# Patient Record
Sex: FEMALE | Race: WHITE | Employment: UNEMPLOYED | URBAN - METROPOLITAN AREA
[De-identification: names, ages, dates, MRNs, and addresses within clinical notes are randomized per-mention and may not be internally consistent; named-entity substitution may affect disease eponyms.]

---

## 2019-01-01 ENCOUNTER — HOSPITAL ENCOUNTER (EMERGENCY)
Age: 74
Discharge: ANOTHER HOSPITAL | End: 2019-01-01
Attending: EMERGENCY MEDICINE
Payer: MEDICARE

## 2019-01-01 ENCOUNTER — HOSPITAL ENCOUNTER (INPATIENT)
Age: 74
LOS: 2 days | Discharge: HOME | DRG: 190 | End: 2019-01-03
Attending: INTERNAL MEDICINE | Admitting: INTERNAL MEDICINE
Payer: MEDICARE

## 2019-01-01 ENCOUNTER — APPOINTMENT (OUTPATIENT)
Dept: GENERAL RADIOLOGY | Age: 74
End: 2019-01-01
Payer: MEDICARE

## 2019-01-01 ENCOUNTER — APPOINTMENT (OUTPATIENT)
Dept: CT IMAGING | Age: 74
End: 2019-01-01
Payer: MEDICARE

## 2019-01-01 VITALS
HEIGHT: 62 IN | BODY MASS INDEX: 19.32 KG/M2 | TEMPERATURE: 98.1 F | HEART RATE: 88 BPM | SYSTOLIC BLOOD PRESSURE: 163 MMHG | WEIGHT: 105 LBS | DIASTOLIC BLOOD PRESSURE: 90 MMHG | RESPIRATION RATE: 20 BRPM | OXYGEN SATURATION: 95 %

## 2019-01-01 DIAGNOSIS — J18.9 PNEUMONIA DUE TO ORGANISM: ICD-10-CM

## 2019-01-01 DIAGNOSIS — R91.8 LUNG MASS: Primary | ICD-10-CM

## 2019-01-01 PROBLEM — J44.1 COPD EXACERBATION (HCC): Status: ACTIVE | Noted: 2019-01-01

## 2019-01-01 LAB
ALBUMIN SERPL-MCNC: 4.2 G/DL (ref 3.9–4.9)
ALP BLD-CCNC: 69 U/L (ref 40–130)
ALT SERPL-CCNC: 20 U/L (ref 0–33)
ANION GAP SERPL CALCULATED.3IONS-SCNC: 16 MEQ/L (ref 7–13)
APTT: 26.4 SEC (ref 21.6–35.4)
AST SERPL-CCNC: 23 U/L (ref 0–35)
BACTERIA: NORMAL /HPF
BASOPHILS ABSOLUTE: 0 K/UL (ref 0–0.2)
BASOPHILS RELATIVE PERCENT: 0.3 %
BILIRUB SERPL-MCNC: 0.3 MG/DL (ref 0–1.2)
BILIRUBIN URINE: NEGATIVE
BLOOD, URINE: NORMAL
BUN BLDV-MCNC: 11 MG/DL (ref 8–23)
CALCIUM SERPL-MCNC: 9.6 MG/DL (ref 8.6–10.2)
CHLORIDE BLD-SCNC: 101 MEQ/L (ref 98–107)
CLARITY: CLEAR
CO2: 22 MEQ/L (ref 22–29)
COLOR: YELLOW
CREAT SERPL-MCNC: 0.58 MG/DL (ref 0.5–0.9)
EKG ATRIAL RATE: 87 BPM
EKG P AXIS: 98 DEGREES
EKG P-R INTERVAL: 140 MS
EKG Q-T INTERVAL: 384 MS
EKG QRS DURATION: 86 MS
EKG QTC CALCULATION (BAZETT): 462 MS
EKG R AXIS: -57 DEGREES
EKG T AXIS: 113 DEGREES
EKG VENTRICULAR RATE: 87 BPM
EOSINOPHILS ABSOLUTE: 0 K/UL (ref 0–0.7)
EOSINOPHILS RELATIVE PERCENT: 0.9 %
EPITHELIAL CELLS, UA: NORMAL /HPF
GFR AFRICAN AMERICAN: >60
GFR NON-AFRICAN AMERICAN: >60
GLOBULIN: 3.9 G/DL (ref 2.3–3.5)
GLUCOSE BLD-MCNC: 109 MG/DL (ref 74–109)
GLUCOSE URINE: NEGATIVE MG/DL
HCT VFR BLD CALC: 46.8 % (ref 37–47)
HEMOGLOBIN: 15.9 G/DL (ref 12–16)
INR BLD: 1
KETONES, URINE: NORMAL MG/DL
LACTIC ACID: 1.4 MMOL/L (ref 0.5–2.2)
LEUKOCYTE ESTERASE, URINE: NORMAL
LYMPHOCYTES ABSOLUTE: 0.8 K/UL (ref 1–4.8)
LYMPHOCYTES RELATIVE PERCENT: 15.1 %
MAGNESIUM: 1.8 MG/DL (ref 1.7–2.3)
MCH RBC QN AUTO: 32.1 PG (ref 27–31.3)
MCHC RBC AUTO-ENTMCNC: 33.9 % (ref 33–37)
MCV RBC AUTO: 94.7 FL (ref 82–100)
MONOCYTES ABSOLUTE: 0.4 K/UL (ref 0.2–0.8)
MONOCYTES RELATIVE PERCENT: 7.6 %
NEUTROPHILS ABSOLUTE: 4 K/UL (ref 1.4–6.5)
NEUTROPHILS RELATIVE PERCENT: 76.1 %
NITRITE, URINE: NEGATIVE
PDW BLD-RTO: 13.5 % (ref 11.5–14.5)
PH UA: 7 (ref 5–9)
PLATELET # BLD: 241 K/UL (ref 130–400)
POTASSIUM SERPL-SCNC: 4 MEQ/L (ref 3.5–5.1)
PROCALCITONIN: <0.02 NG/ML (ref 0–0.15)
PROTEIN UA: NEGATIVE MG/DL
PROTHROMBIN TIME: 9.9 SEC (ref 9–11.5)
RAPID INFLUENZA  B AGN: NEGATIVE
RAPID INFLUENZA A AGN: NEGATIVE
RBC # BLD: 4.94 M/UL (ref 4.2–5.4)
RBC UA: NORMAL /HPF (ref 0–2)
SODIUM BLD-SCNC: 139 MEQ/L (ref 132–144)
SPECIFIC GRAVITY UA: 1.01 (ref 1–1.03)
TOTAL PROTEIN: 8.1 G/DL (ref 6.4–8.1)
URINE REFLEX TO CULTURE: YES
UROBILINOGEN, URINE: 0.2 E.U./DL
WBC # BLD: 5.2 K/UL (ref 4.8–10.8)
WBC UA: NORMAL /HPF (ref 0–5)

## 2019-01-01 PROCEDURE — 2580000003 HC RX 258: Performed by: NURSE PRACTITIONER

## 2019-01-01 PROCEDURE — 36415 COLL VENOUS BLD VENIPUNCTURE: CPT

## 2019-01-01 PROCEDURE — 83735 ASSAY OF MAGNESIUM: CPT

## 2019-01-01 PROCEDURE — 99285 EMERGENCY DEPT VISIT HI MDM: CPT

## 2019-01-01 PROCEDURE — 6370000000 HC RX 637 (ALT 250 FOR IP): Performed by: INTERNAL MEDICINE

## 2019-01-01 PROCEDURE — 6360000002 HC RX W HCPCS

## 2019-01-01 PROCEDURE — 96365 THER/PROPH/DIAG IV INF INIT: CPT

## 2019-01-01 PROCEDURE — 94664 DEMO&/EVAL PT USE INHALER: CPT

## 2019-01-01 PROCEDURE — 94640 AIRWAY INHALATION TREATMENT: CPT

## 2019-01-01 PROCEDURE — 2580000003 HC RX 258: Performed by: EMERGENCY MEDICINE

## 2019-01-01 PROCEDURE — 71046 X-RAY EXAM CHEST 2 VIEWS: CPT

## 2019-01-01 PROCEDURE — 96372 THER/PROPH/DIAG INJ SC/IM: CPT

## 2019-01-01 PROCEDURE — 93005 ELECTROCARDIOGRAM TRACING: CPT

## 2019-01-01 PROCEDURE — 2580000003 HC RX 258: Performed by: INTERNAL MEDICINE

## 2019-01-01 PROCEDURE — 71260 CT THORAX DX C+: CPT

## 2019-01-01 PROCEDURE — 84145 PROCALCITONIN (PCT): CPT

## 2019-01-01 PROCEDURE — 85025 COMPLETE CBC W/AUTO DIFF WBC: CPT

## 2019-01-01 PROCEDURE — 87040 BLOOD CULTURE FOR BACTERIA: CPT

## 2019-01-01 PROCEDURE — G0378 HOSPITAL OBSERVATION PER HR: HCPCS

## 2019-01-01 PROCEDURE — 96375 TX/PRO/DX INJ NEW DRUG ADDON: CPT

## 2019-01-01 PROCEDURE — 6370000000 HC RX 637 (ALT 250 FOR IP): Performed by: EMERGENCY MEDICINE

## 2019-01-01 PROCEDURE — 2700000000 HC OXYGEN THERAPY PER DAY

## 2019-01-01 PROCEDURE — 87804 INFLUENZA ASSAY W/OPTIC: CPT

## 2019-01-01 PROCEDURE — 6360000004 HC RX CONTRAST MEDICATION: Performed by: EMERGENCY MEDICINE

## 2019-01-01 PROCEDURE — 80053 COMPREHEN METABOLIC PANEL: CPT

## 2019-01-01 PROCEDURE — 87086 URINE CULTURE/COLONY COUNT: CPT

## 2019-01-01 PROCEDURE — 6360000002 HC RX W HCPCS: Performed by: INTERNAL MEDICINE

## 2019-01-01 PROCEDURE — 6360000002 HC RX W HCPCS: Performed by: EMERGENCY MEDICINE

## 2019-01-01 PROCEDURE — 83605 ASSAY OF LACTIC ACID: CPT

## 2019-01-01 PROCEDURE — 85730 THROMBOPLASTIN TIME PARTIAL: CPT

## 2019-01-01 PROCEDURE — 85610 PROTHROMBIN TIME: CPT

## 2019-01-01 PROCEDURE — 81001 URINALYSIS AUTO W/SCOPE: CPT

## 2019-01-01 RX ORDER — CEFTRIAXONE 1 G/1
INJECTION, POWDER, FOR SOLUTION INTRAMUSCULAR; INTRAVENOUS
Status: COMPLETED
Start: 2019-01-01 | End: 2019-01-01

## 2019-01-01 RX ORDER — SODIUM CHLORIDE 0.9 % (FLUSH) 0.9 %
10 SYRINGE (ML) INJECTION EVERY 12 HOURS SCHEDULED
Status: DISCONTINUED | OUTPATIENT
Start: 2019-01-01 | End: 2019-01-03 | Stop reason: HOSPADM

## 2019-01-01 RX ORDER — NICOTINE 21 MG/24HR
1 PATCH, TRANSDERMAL 24 HOURS TRANSDERMAL DAILY
Status: DISCONTINUED | OUTPATIENT
Start: 2019-01-01 | End: 2019-01-03 | Stop reason: HOSPADM

## 2019-01-01 RX ORDER — LEVOFLOXACIN 5 MG/ML
750 INJECTION, SOLUTION INTRAVENOUS ONCE
Status: COMPLETED | OUTPATIENT
Start: 2019-01-01 | End: 2019-01-01

## 2019-01-01 RX ORDER — 0.9 % SODIUM CHLORIDE 0.9 %
1000 INTRAVENOUS SOLUTION INTRAVENOUS ONCE
Status: COMPLETED | OUTPATIENT
Start: 2019-01-01 | End: 2019-01-01

## 2019-01-01 RX ORDER — SODIUM CHLORIDE 0.9 % (FLUSH) 0.9 %
10 SYRINGE (ML) INJECTION PRN
Status: DISCONTINUED | OUTPATIENT
Start: 2019-01-01 | End: 2019-01-03 | Stop reason: HOSPADM

## 2019-01-01 RX ORDER — ALBUTEROL SULFATE 2.5 MG/3ML
2.5 SOLUTION RESPIRATORY (INHALATION)
Status: DISCONTINUED | OUTPATIENT
Start: 2019-01-01 | End: 2019-01-01

## 2019-01-01 RX ORDER — IPRATROPIUM BROMIDE AND ALBUTEROL SULFATE 2.5; .5 MG/3ML; MG/3ML
1 SOLUTION RESPIRATORY (INHALATION) 3 TIMES DAILY
Status: DISCONTINUED | OUTPATIENT
Start: 2019-01-01 | End: 2019-01-03 | Stop reason: HOSPADM

## 2019-01-01 RX ORDER — ALBUTEROL SULFATE 2.5 MG/3ML
2.5 SOLUTION RESPIRATORY (INHALATION)
Status: DISCONTINUED | OUTPATIENT
Start: 2019-01-01 | End: 2019-01-03 | Stop reason: HOSPADM

## 2019-01-01 RX ORDER — FAMOTIDINE 20 MG/1
20 TABLET, FILM COATED ORAL 2 TIMES DAILY
Status: DISCONTINUED | OUTPATIENT
Start: 2019-01-01 | End: 2019-01-03 | Stop reason: HOSPADM

## 2019-01-01 RX ORDER — METHYLPREDNISOLONE SODIUM SUCCINATE 125 MG/2ML
125 INJECTION, POWDER, LYOPHILIZED, FOR SOLUTION INTRAMUSCULAR; INTRAVENOUS ONCE
Status: COMPLETED | OUTPATIENT
Start: 2019-01-01 | End: 2019-01-01

## 2019-01-01 RX ORDER — ONDANSETRON 2 MG/ML
4 INJECTION INTRAMUSCULAR; INTRAVENOUS EVERY 6 HOURS PRN
Status: DISCONTINUED | OUTPATIENT
Start: 2019-01-01 | End: 2019-01-03 | Stop reason: HOSPADM

## 2019-01-01 RX ORDER — IPRATROPIUM BROMIDE AND ALBUTEROL SULFATE 2.5; .5 MG/3ML; MG/3ML
1 SOLUTION RESPIRATORY (INHALATION)
Status: DISCONTINUED | OUTPATIENT
Start: 2019-01-01 | End: 2019-01-01

## 2019-01-01 RX ORDER — IPRATROPIUM BROMIDE AND ALBUTEROL SULFATE 2.5; .5 MG/3ML; MG/3ML
1 SOLUTION RESPIRATORY (INHALATION) ONCE
Status: COMPLETED | OUTPATIENT
Start: 2019-01-01 | End: 2019-01-01

## 2019-01-01 RX ORDER — GUAIFENESIN 100 MG/5ML
200 SOLUTION ORAL EVERY 4 HOURS PRN
Status: ON HOLD | COMMUNITY
End: 2019-01-02

## 2019-01-01 RX ORDER — PREDNISONE 20 MG/1
40 TABLET ORAL DAILY
Status: DISCONTINUED | OUTPATIENT
Start: 2019-01-01 | End: 2019-01-03 | Stop reason: HOSPADM

## 2019-01-01 RX ADMIN — IPRATROPIUM BROMIDE AND ALBUTEROL SULFATE 1 AMPULE: .5; 3 SOLUTION RESPIRATORY (INHALATION) at 11:26

## 2019-01-01 RX ADMIN — AZITHROMYCIN MONOHYDRATE 500 MG: 500 INJECTION, POWDER, LYOPHILIZED, FOR SOLUTION INTRAVENOUS at 17:56

## 2019-01-01 RX ADMIN — CEFTRIAXONE 1000 MG: 1 INJECTION, POWDER, FOR SOLUTION INTRAMUSCULAR; INTRAVENOUS at 14:03

## 2019-01-01 RX ADMIN — FAMOTIDINE 20 MG: 20 TABLET, FILM COATED ORAL at 21:52

## 2019-01-01 RX ADMIN — Medication 10 ML: at 21:53

## 2019-01-01 RX ADMIN — IPRATROPIUM BROMIDE AND ALBUTEROL SULFATE 1 AMPULE: .5; 3 SOLUTION RESPIRATORY (INHALATION) at 21:03

## 2019-01-01 RX ADMIN — LEVOFLOXACIN 750 MG: 5 INJECTION, SOLUTION INTRAVENOUS at 13:04

## 2019-01-01 RX ADMIN — ENOXAPARIN SODIUM 40 MG: 40 INJECTION SUBCUTANEOUS at 17:56

## 2019-01-01 RX ADMIN — IOPAMIDOL 100 ML: 612 INJECTION, SOLUTION INTRAVENOUS at 12:56

## 2019-01-01 RX ADMIN — SODIUM CHLORIDE 1000 ML: 9 INJECTION, SOLUTION INTRAVENOUS at 11:31

## 2019-01-01 RX ADMIN — METHYLPREDNISOLONE SODIUM SUCCINATE 125 MG: 125 INJECTION, POWDER, FOR SOLUTION INTRAMUSCULAR; INTRAVENOUS at 11:31

## 2019-01-01 RX ADMIN — PREDNISONE 40 MG: 20 TABLET ORAL at 17:56

## 2019-01-01 ASSESSMENT — ENCOUNTER SYMPTOMS
COUGH: 1
ABDOMINAL DISTENTION: 0
COLOR CHANGE: 0
EYE PAIN: 0
ABDOMINAL PAIN: 0
CONSTIPATION: 0
APNEA: 0
NAUSEA: 0
VOMITING: 0
WHEEZING: 1
SORE THROAT: 0
SHORTNESS OF BREATH: 1
DIARRHEA: 0
PHOTOPHOBIA: 0
CHEST TIGHTNESS: 1
BACK PAIN: 0
SINUS PRESSURE: 0
RHINORRHEA: 0

## 2019-01-02 PROBLEM — J44.9 COPD (CHRONIC OBSTRUCTIVE PULMONARY DISEASE) (HCC): Status: ACTIVE | Noted: 2019-01-02

## 2019-01-02 LAB
ANION GAP SERPL CALCULATED.3IONS-SCNC: 13 MEQ/L (ref 7–13)
BASOPHILS ABSOLUTE: 0 K/UL (ref 0–0.2)
BASOPHILS RELATIVE PERCENT: 0.1 %
BUN BLDV-MCNC: 10 MG/DL (ref 8–23)
CALCIUM SERPL-MCNC: 9 MG/DL (ref 8.6–10.2)
CHLORIDE BLD-SCNC: 106 MEQ/L (ref 98–107)
CO2: 21 MEQ/L (ref 22–29)
CREAT SERPL-MCNC: 0.57 MG/DL (ref 0.5–0.9)
EOSINOPHILS ABSOLUTE: 0 K/UL (ref 0–0.7)
EOSINOPHILS RELATIVE PERCENT: 0 %
GFR AFRICAN AMERICAN: >60
GFR NON-AFRICAN AMERICAN: >60
GLUCOSE BLD-MCNC: 130 MG/DL (ref 74–109)
HCT VFR BLD CALC: 40.6 % (ref 37–47)
HEMOGLOBIN: 14 G/DL (ref 12–16)
LYMPHOCYTES ABSOLUTE: 0.7 K/UL (ref 1–4.8)
LYMPHOCYTES RELATIVE PERCENT: 12.7 %
MCH RBC QN AUTO: 33 PG (ref 27–31.3)
MCHC RBC AUTO-ENTMCNC: 34.5 % (ref 33–37)
MCV RBC AUTO: 95.8 FL (ref 82–100)
MONOCYTES ABSOLUTE: 0.5 K/UL (ref 0.2–0.8)
MONOCYTES RELATIVE PERCENT: 9.3 %
NEUTROPHILS ABSOLUTE: 4.1 K/UL (ref 1.4–6.5)
NEUTROPHILS RELATIVE PERCENT: 77.9 %
PDW BLD-RTO: 13.5 % (ref 11.5–14.5)
PLATELET # BLD: 205 K/UL (ref 130–400)
POTASSIUM REFLEX MAGNESIUM: 4.5 MEQ/L (ref 3.5–5.1)
RBC # BLD: 4.23 M/UL (ref 4.2–5.4)
SODIUM BLD-SCNC: 140 MEQ/L (ref 132–144)
WBC # BLD: 5.3 K/UL (ref 4.8–10.8)

## 2019-01-02 PROCEDURE — 36415 COLL VENOUS BLD VENIPUNCTURE: CPT

## 2019-01-02 PROCEDURE — 1210000000 HC MED SURG R&B

## 2019-01-02 PROCEDURE — 6370000000 HC RX 637 (ALT 250 FOR IP): Performed by: INTERNAL MEDICINE

## 2019-01-02 PROCEDURE — 94668 MNPJ CHEST WALL SBSQ: CPT

## 2019-01-02 PROCEDURE — 99223 1ST HOSP IP/OBS HIGH 75: CPT | Performed by: INTERNAL MEDICINE

## 2019-01-02 PROCEDURE — 94640 AIRWAY INHALATION TREATMENT: CPT

## 2019-01-02 PROCEDURE — 80048 BASIC METABOLIC PNL TOTAL CA: CPT

## 2019-01-02 PROCEDURE — 87070 CULTURE OTHR SPECIMN AEROBIC: CPT

## 2019-01-02 PROCEDURE — 94667 MNPJ CHEST WALL 1ST: CPT

## 2019-01-02 PROCEDURE — 2700000000 HC OXYGEN THERAPY PER DAY

## 2019-01-02 PROCEDURE — 6360000002 HC RX W HCPCS: Performed by: INTERNAL MEDICINE

## 2019-01-02 PROCEDURE — 94618 PULMONARY STRESS TESTING: CPT

## 2019-01-02 PROCEDURE — 85025 COMPLETE CBC W/AUTO DIFF WBC: CPT

## 2019-01-02 PROCEDURE — 87205 SMEAR GRAM STAIN: CPT

## 2019-01-02 RX ORDER — NICOTINE 21 MG/24HR
1 PATCH, TRANSDERMAL 24 HOURS TRANSDERMAL DAILY
Qty: 30 PATCH | Refills: 3 | Status: SHIPPED | OUTPATIENT
Start: 2019-01-03 | End: 2019-01-10

## 2019-01-02 RX ORDER — NICOTINE 21 MG/24HR
1 PATCH, TRANSDERMAL 24 HOURS TRANSDERMAL DAILY
Qty: 30 PATCH | Refills: 3 | Status: SHIPPED | OUTPATIENT
Start: 2019-01-03 | End: 2019-01-02

## 2019-01-02 RX ORDER — AMLODIPINE BESYLATE 5 MG/1
5 TABLET ORAL DAILY
Qty: 30 TABLET | Refills: 3 | Status: SHIPPED | OUTPATIENT
Start: 2019-01-03 | End: 2019-05-23 | Stop reason: SDUPTHER

## 2019-01-02 RX ORDER — AMLODIPINE BESYLATE 5 MG/1
5 TABLET ORAL DAILY
Status: DISCONTINUED | OUTPATIENT
Start: 2019-01-02 | End: 2019-01-03 | Stop reason: HOSPADM

## 2019-01-02 RX ORDER — AZITHROMYCIN 250 MG/1
250 TABLET, FILM COATED ORAL DAILY
Status: DISCONTINUED | OUTPATIENT
Start: 2019-01-02 | End: 2019-01-03 | Stop reason: HOSPADM

## 2019-01-02 RX ADMIN — IPRATROPIUM BROMIDE AND ALBUTEROL SULFATE 1 AMPULE: .5; 3 SOLUTION RESPIRATORY (INHALATION) at 13:31

## 2019-01-02 RX ADMIN — AZITHROMYCIN 250 MG: 250 TABLET, FILM COATED ORAL at 13:11

## 2019-01-02 RX ADMIN — FAMOTIDINE 20 MG: 20 TABLET, FILM COATED ORAL at 23:29

## 2019-01-02 RX ADMIN — ENOXAPARIN SODIUM 40 MG: 40 INJECTION SUBCUTANEOUS at 08:25

## 2019-01-02 RX ADMIN — IPRATROPIUM BROMIDE AND ALBUTEROL SULFATE 1 AMPULE: .5; 3 SOLUTION RESPIRATORY (INHALATION) at 07:40

## 2019-01-02 RX ADMIN — IPRATROPIUM BROMIDE AND ALBUTEROL SULFATE 1 AMPULE: .5; 3 SOLUTION RESPIRATORY (INHALATION) at 19:30

## 2019-01-02 RX ADMIN — PREDNISONE 40 MG: 20 TABLET ORAL at 08:24

## 2019-01-03 VITALS
SYSTOLIC BLOOD PRESSURE: 123 MMHG | HEART RATE: 90 BPM | OXYGEN SATURATION: 98 % | RESPIRATION RATE: 20 BRPM | DIASTOLIC BLOOD PRESSURE: 69 MMHG | HEIGHT: 62 IN | TEMPERATURE: 98.2 F | WEIGHT: 98.99 LBS | BODY MASS INDEX: 18.22 KG/M2

## 2019-01-03 DIAGNOSIS — R91.8 LUNG NODULES: Primary | ICD-10-CM

## 2019-01-03 LAB
ANION GAP SERPL CALCULATED.3IONS-SCNC: 13 MEQ/L (ref 7–13)
BUN BLDV-MCNC: 20 MG/DL (ref 8–23)
CALCIUM SERPL-MCNC: 8.3 MG/DL (ref 8.6–10.2)
CHLORIDE BLD-SCNC: 104 MEQ/L (ref 98–107)
CO2: 21 MEQ/L (ref 22–29)
CREAT SERPL-MCNC: 0.49 MG/DL (ref 0.5–0.9)
GFR AFRICAN AMERICAN: >60
GFR NON-AFRICAN AMERICAN: >60
GLUCOSE BLD-MCNC: 90 MG/DL (ref 74–109)
HCT VFR BLD CALC: 39.5 % (ref 37–47)
HEMOGLOBIN: 13.4 G/DL (ref 12–16)
MCH RBC QN AUTO: 32.8 PG (ref 27–31.3)
MCHC RBC AUTO-ENTMCNC: 34 % (ref 33–37)
MCV RBC AUTO: 96.5 FL (ref 82–100)
PDW BLD-RTO: 13.3 % (ref 11.5–14.5)
PLATELET # BLD: 200 K/UL (ref 130–400)
POTASSIUM REFLEX MAGNESIUM: 4.2 MEQ/L (ref 3.5–5.1)
RBC # BLD: 4.09 M/UL (ref 4.2–5.4)
SODIUM BLD-SCNC: 138 MEQ/L (ref 132–144)
URINE CULTURE, ROUTINE: NORMAL
WBC # BLD: 6.2 K/UL (ref 4.8–10.8)

## 2019-01-03 PROCEDURE — 94760 N-INVAS EAR/PLS OXIMETRY 1: CPT

## 2019-01-03 PROCEDURE — 94640 AIRWAY INHALATION TREATMENT: CPT

## 2019-01-03 PROCEDURE — 99232 SBSQ HOSP IP/OBS MODERATE 35: CPT | Performed by: INTERNAL MEDICINE

## 2019-01-03 PROCEDURE — 85027 COMPLETE CBC AUTOMATED: CPT

## 2019-01-03 PROCEDURE — 2580000003 HC RX 258: Performed by: NURSE PRACTITIONER

## 2019-01-03 PROCEDURE — 36415 COLL VENOUS BLD VENIPUNCTURE: CPT

## 2019-01-03 PROCEDURE — 2700000000 HC OXYGEN THERAPY PER DAY

## 2019-01-03 PROCEDURE — 80048 BASIC METABOLIC PNL TOTAL CA: CPT

## 2019-01-03 PROCEDURE — 94618 PULMONARY STRESS TESTING: CPT

## 2019-01-03 PROCEDURE — 6370000000 HC RX 637 (ALT 250 FOR IP): Performed by: INTERNAL MEDICINE

## 2019-01-03 PROCEDURE — 99223 1ST HOSP IP/OBS HIGH 75: CPT | Performed by: RADIOLOGY

## 2019-01-03 RX ORDER — IPRATROPIUM BROMIDE AND ALBUTEROL SULFATE 2.5; .5 MG/3ML; MG/3ML
3 SOLUTION RESPIRATORY (INHALATION) EVERY 6 HOURS
Qty: 360 ML | Refills: 1 | Status: SHIPPED | OUTPATIENT
Start: 2019-01-03 | End: 2019-03-25 | Stop reason: SDUPTHER

## 2019-01-03 RX ORDER — AZITHROMYCIN 250 MG/1
250 TABLET, FILM COATED ORAL DAILY
Qty: 5 TABLET | Refills: 0 | Status: SHIPPED | OUTPATIENT
Start: 2019-01-04 | End: 2019-01-09

## 2019-01-03 RX ORDER — PREDNISONE 10 MG/1
TABLET ORAL
Qty: 30 TABLET | Refills: 0 | Status: SHIPPED | OUTPATIENT
Start: 2019-01-03 | End: 2019-01-24 | Stop reason: ALTCHOICE

## 2019-01-03 RX ADMIN — PREDNISONE 40 MG: 20 TABLET ORAL at 08:43

## 2019-01-03 RX ADMIN — AMLODIPINE BESYLATE 5 MG: 5 TABLET ORAL at 08:43

## 2019-01-03 RX ADMIN — Medication 10 ML: at 08:44

## 2019-01-03 RX ADMIN — IPRATROPIUM BROMIDE AND ALBUTEROL SULFATE 1 AMPULE: .5; 3 SOLUTION RESPIRATORY (INHALATION) at 07:27

## 2019-01-03 RX ADMIN — AZITHROMYCIN 250 MG: 250 TABLET, FILM COATED ORAL at 08:43

## 2019-01-03 RX ADMIN — FAMOTIDINE 20 MG: 20 TABLET, FILM COATED ORAL at 08:43

## 2019-01-03 ASSESSMENT — ENCOUNTER SYMPTOMS
PHOTOPHOBIA: 0
COUGH: 1
WHEEZING: 1
NAUSEA: 0
BACK PAIN: 0
GASTROINTESTINAL NEGATIVE: 1
VOMITING: 0
SHORTNESS OF BREATH: 1
DIARRHEA: 0
CONSTIPATION: 0
ABDOMINAL PAIN: 0
EYES NEGATIVE: 1

## 2019-01-04 LAB
CULTURE, RESPIRATORY: ABNORMAL
CULTURE, RESPIRATORY: ABNORMAL
GRAM STAIN RESULT: ABNORMAL
ORGANISM: ABNORMAL

## 2019-01-06 LAB
BLOOD CULTURE, ROUTINE: NORMAL
CULTURE, BLOOD 2: NORMAL

## 2019-01-09 ENCOUNTER — HOSPITAL ENCOUNTER (OUTPATIENT)
Dept: CT IMAGING | Age: 74
Discharge: HOME | End: 2019-01-11
Payer: MEDICARE

## 2019-01-09 ENCOUNTER — HOSPITAL ENCOUNTER (OUTPATIENT)
Dept: GENERAL RADIOLOGY | Age: 74
Discharge: HOME | End: 2019-01-11
Payer: MEDICARE

## 2019-01-09 VITALS
HEART RATE: 82 BPM | TEMPERATURE: 97.8 F | DIASTOLIC BLOOD PRESSURE: 80 MMHG | HEIGHT: 62 IN | OXYGEN SATURATION: 93 % | RESPIRATION RATE: 19 BRPM | BODY MASS INDEX: 18.03 KG/M2 | WEIGHT: 98 LBS | SYSTOLIC BLOOD PRESSURE: 130 MMHG

## 2019-01-09 DIAGNOSIS — R91.8 LUNG NODULES: ICD-10-CM

## 2019-01-09 PROCEDURE — 88342 IMHCHEM/IMCYTCHM 1ST ANTB: CPT

## 2019-01-09 PROCEDURE — 6370000000 HC RX 637 (ALT 250 FOR IP): Performed by: NURSE PRACTITIONER

## 2019-01-09 PROCEDURE — 99214 OFFICE O/P EST MOD 30 MIN: CPT | Performed by: RADIOLOGY

## 2019-01-09 PROCEDURE — 2500000003 HC RX 250 WO HCPCS: Performed by: NURSE PRACTITIONER

## 2019-01-09 PROCEDURE — 88341 IMHCHEM/IMCYTCHM EA ADD ANTB: CPT

## 2019-01-09 PROCEDURE — 32405 CT NEEDLE BIOPSY LUNG PERCUTANEOUS: CPT | Performed by: RADIOLOGY

## 2019-01-09 PROCEDURE — 88313 SPECIAL STAINS GROUP 2: CPT

## 2019-01-09 PROCEDURE — 71046 X-RAY EXAM CHEST 2 VIEWS: CPT | Performed by: RADIOLOGY

## 2019-01-09 PROCEDURE — 2580000003 HC RX 258: Performed by: NURSE PRACTITIONER

## 2019-01-09 PROCEDURE — 71046 X-RAY EXAM CHEST 2 VIEWS: CPT

## 2019-01-09 PROCEDURE — 6360000002 HC RX W HCPCS: Performed by: NURSE PRACTITIONER

## 2019-01-09 PROCEDURE — 77012 CT SCAN FOR NEEDLE BIOPSY: CPT | Performed by: RADIOLOGY

## 2019-01-09 PROCEDURE — 32405 CT NEEDLE BIOPSY LUNG PERCUTANEOUS: CPT

## 2019-01-09 PROCEDURE — 88305 TISSUE EXAM BY PATHOLOGIST: CPT

## 2019-01-09 PROCEDURE — 77012 CT SCAN FOR NEEDLE BIOPSY: CPT

## 2019-01-09 RX ORDER — FENTANYL CITRATE 50 UG/ML
50 INJECTION, SOLUTION INTRAMUSCULAR; INTRAVENOUS
Status: DISCONTINUED | OUTPATIENT
Start: 2019-01-09 | End: 2019-01-12 | Stop reason: HOSPADM

## 2019-01-09 RX ORDER — BACITRACIN, NEOMYCIN, POLYMYXIN B 400; 3.5; 5 [USP'U]/G; MG/G; [USP'U]/G
OINTMENT TOPICAL ONCE
Status: COMPLETED | OUTPATIENT
Start: 2019-01-09 | End: 2019-01-09

## 2019-01-09 RX ORDER — LIDOCAINE HYDROCHLORIDE 20 MG/ML
5 INJECTION, SOLUTION INFILTRATION; PERINEURAL
Status: DISCONTINUED | OUTPATIENT
Start: 2019-01-09 | End: 2019-01-12 | Stop reason: HOSPADM

## 2019-01-09 RX ORDER — 0.9 % SODIUM CHLORIDE 0.9 %
250 INTRAVENOUS SOLUTION INTRAVENOUS
Status: DISCONTINUED | OUTPATIENT
Start: 2019-01-09 | End: 2019-01-12 | Stop reason: HOSPADM

## 2019-01-09 RX ORDER — MIDAZOLAM HYDROCHLORIDE 1 MG/ML
2 INJECTION INTRAMUSCULAR; INTRAVENOUS
Status: DISCONTINUED | OUTPATIENT
Start: 2019-01-09 | End: 2019-01-12 | Stop reason: HOSPADM

## 2019-01-09 RX ADMIN — FENTANYL CITRATE 50 MCG: 50 INJECTION, SOLUTION INTRAMUSCULAR; INTRAVENOUS at 10:40

## 2019-01-09 RX ADMIN — MIDAZOLAM HYDROCHLORIDE 0.5 MG: 1 INJECTION, SOLUTION INTRAMUSCULAR; INTRAVENOUS at 10:42

## 2019-01-09 RX ADMIN — BACITRACIN, NEOMYCIN, POLYMYXIN B 1 G: 400; 3.5; 5 OINTMENT TOPICAL at 10:53

## 2019-01-09 RX ADMIN — LIDOCAINE HYDROCHLORIDE 6 ML: 20 INJECTION, SOLUTION INFILTRATION; PERINEURAL at 10:45

## 2019-01-09 RX ADMIN — SODIUM CHLORIDE 250 ML: 9 INJECTION, SOLUTION INTRAVENOUS at 10:27

## 2019-01-09 ASSESSMENT — ENCOUNTER SYMPTOMS
PHOTOPHOBIA: 0
ABDOMINAL PAIN: 0
VOMITING: 0
GASTROINTESTINAL NEGATIVE: 1
EYES NEGATIVE: 1
NAUSEA: 0
WHEEZING: 0
BACK PAIN: 0
DIARRHEA: 0
SHORTNESS OF BREATH: 0
CONSTIPATION: 0
COUGH: 1

## 2019-01-09 ASSESSMENT — PAIN - FUNCTIONAL ASSESSMENT: PAIN_FUNCTIONAL_ASSESSMENT: 0-10

## 2019-01-10 ENCOUNTER — OFFICE VISIT (OUTPATIENT)
Dept: PULMONOLOGY | Age: 74
End: 2019-01-10
Payer: MEDICARE

## 2019-01-10 VITALS
HEIGHT: 62 IN | OXYGEN SATURATION: 98 % | WEIGHT: 97.6 LBS | HEART RATE: 89 BPM | SYSTOLIC BLOOD PRESSURE: 110 MMHG | TEMPERATURE: 98.5 F | DIASTOLIC BLOOD PRESSURE: 68 MMHG | BODY MASS INDEX: 17.96 KG/M2

## 2019-01-10 DIAGNOSIS — R64 PULMONARY CACHEXIA DUE TO CHRONIC OBSTRUCTIVE PULMONARY DISEASE (HCC): ICD-10-CM

## 2019-01-10 DIAGNOSIS — J44.9 PULMONARY CACHEXIA DUE TO CHRONIC OBSTRUCTIVE PULMONARY DISEASE (HCC): ICD-10-CM

## 2019-01-10 DIAGNOSIS — R91.8 LUNG NODULES: ICD-10-CM

## 2019-01-10 DIAGNOSIS — J44.9 CHRONIC OBSTRUCTIVE PULMONARY DISEASE, UNSPECIFIED COPD TYPE (HCC): Primary | ICD-10-CM

## 2019-01-10 PROCEDURE — 1111F DSCHRG MED/CURRENT MED MERGE: CPT | Performed by: INTERNAL MEDICINE

## 2019-01-10 PROCEDURE — G8419 CALC BMI OUT NRM PARAM NOF/U: HCPCS | Performed by: INTERNAL MEDICINE

## 2019-01-10 PROCEDURE — 3017F COLORECTAL CA SCREEN DOC REV: CPT | Performed by: INTERNAL MEDICINE

## 2019-01-10 PROCEDURE — 1123F ACP DISCUSS/DSCN MKR DOCD: CPT | Performed by: INTERNAL MEDICINE

## 2019-01-10 PROCEDURE — 1101F PT FALLS ASSESS-DOCD LE1/YR: CPT | Performed by: INTERNAL MEDICINE

## 2019-01-10 PROCEDURE — G8427 DOCREV CUR MEDS BY ELIG CLIN: HCPCS | Performed by: INTERNAL MEDICINE

## 2019-01-10 PROCEDURE — 1090F PRES/ABSN URINE INCON ASSESS: CPT | Performed by: INTERNAL MEDICINE

## 2019-01-10 PROCEDURE — G8400 PT W/DXA NO RESULTS DOC: HCPCS | Performed by: INTERNAL MEDICINE

## 2019-01-10 PROCEDURE — 99214 OFFICE O/P EST MOD 30 MIN: CPT | Performed by: INTERNAL MEDICINE

## 2019-01-10 PROCEDURE — G8926 SPIRO NO PERF OR DOC: HCPCS | Performed by: INTERNAL MEDICINE

## 2019-01-10 PROCEDURE — G8484 FLU IMMUNIZE NO ADMIN: HCPCS | Performed by: INTERNAL MEDICINE

## 2019-01-10 PROCEDURE — 3023F SPIROM DOC REV: CPT | Performed by: INTERNAL MEDICINE

## 2019-01-10 PROCEDURE — 1036F TOBACCO NON-USER: CPT | Performed by: INTERNAL MEDICINE

## 2019-01-10 PROCEDURE — 4040F PNEUMOC VAC/ADMIN/RCVD: CPT | Performed by: INTERNAL MEDICINE

## 2019-01-11 ENCOUNTER — OFFICE VISIT (OUTPATIENT)
Dept: INTERNAL MEDICINE | Age: 74
End: 2019-01-11
Payer: MEDICARE

## 2019-01-11 VITALS
HEIGHT: 62 IN | TEMPERATURE: 97.8 F | DIASTOLIC BLOOD PRESSURE: 78 MMHG | SYSTOLIC BLOOD PRESSURE: 120 MMHG | HEART RATE: 81 BPM | WEIGHT: 98 LBS | BODY MASS INDEX: 18.03 KG/M2 | OXYGEN SATURATION: 97 %

## 2019-01-11 DIAGNOSIS — R91.8 MULTIPLE LUNG NODULES ON CT: ICD-10-CM

## 2019-01-11 DIAGNOSIS — J44.9 CHRONIC OBSTRUCTIVE PULMONARY DISEASE, UNSPECIFIED COPD TYPE (HCC): Primary | ICD-10-CM

## 2019-01-11 DIAGNOSIS — Z23 NEED FOR PROPHYLACTIC VACCINATION AGAINST STREPTOCOCCUS PNEUMONIAE (PNEUMOCOCCUS): ICD-10-CM

## 2019-01-11 DIAGNOSIS — Z72.0 TOBACCO ABUSE: ICD-10-CM

## 2019-01-11 DIAGNOSIS — Z12.31 VISIT FOR SCREENING MAMMOGRAM: ICD-10-CM

## 2019-01-11 DIAGNOSIS — Z12.11 COLON CANCER SCREENING: ICD-10-CM

## 2019-01-11 PROCEDURE — 1111F DSCHRG MED/CURRENT MED MERGE: CPT | Performed by: PHYSICIAN ASSISTANT

## 2019-01-11 PROCEDURE — 4040F PNEUMOC VAC/ADMIN/RCVD: CPT | Performed by: PHYSICIAN ASSISTANT

## 2019-01-11 PROCEDURE — G8427 DOCREV CUR MEDS BY ELIG CLIN: HCPCS | Performed by: PHYSICIAN ASSISTANT

## 2019-01-11 PROCEDURE — G8484 FLU IMMUNIZE NO ADMIN: HCPCS | Performed by: PHYSICIAN ASSISTANT

## 2019-01-11 PROCEDURE — 99203 OFFICE O/P NEW LOW 30 MIN: CPT | Performed by: PHYSICIAN ASSISTANT

## 2019-01-11 PROCEDURE — 1036F TOBACCO NON-USER: CPT | Performed by: PHYSICIAN ASSISTANT

## 2019-01-11 PROCEDURE — 1101F PT FALLS ASSESS-DOCD LE1/YR: CPT | Performed by: PHYSICIAN ASSISTANT

## 2019-01-11 PROCEDURE — 3017F COLORECTAL CA SCREEN DOC REV: CPT | Performed by: PHYSICIAN ASSISTANT

## 2019-01-11 PROCEDURE — G8926 SPIRO NO PERF OR DOC: HCPCS | Performed by: PHYSICIAN ASSISTANT

## 2019-01-11 PROCEDURE — 1123F ACP DISCUSS/DSCN MKR DOCD: CPT | Performed by: PHYSICIAN ASSISTANT

## 2019-01-11 PROCEDURE — G0009 ADMIN PNEUMOCOCCAL VACCINE: HCPCS | Performed by: PHYSICIAN ASSISTANT

## 2019-01-11 PROCEDURE — G8400 PT W/DXA NO RESULTS DOC: HCPCS | Performed by: PHYSICIAN ASSISTANT

## 2019-01-11 PROCEDURE — G8419 CALC BMI OUT NRM PARAM NOF/U: HCPCS | Performed by: PHYSICIAN ASSISTANT

## 2019-01-11 PROCEDURE — 90670 PCV13 VACCINE IM: CPT | Performed by: PHYSICIAN ASSISTANT

## 2019-01-11 PROCEDURE — 1090F PRES/ABSN URINE INCON ASSESS: CPT | Performed by: PHYSICIAN ASSISTANT

## 2019-01-11 PROCEDURE — 3023F SPIROM DOC REV: CPT | Performed by: PHYSICIAN ASSISTANT

## 2019-01-11 ASSESSMENT — PATIENT HEALTH QUESTIONNAIRE - PHQ9
2. FEELING DOWN, DEPRESSED OR HOPELESS: 1
1. LITTLE INTEREST OR PLEASURE IN DOING THINGS: 0
SUM OF ALL RESPONSES TO PHQ QUESTIONS 1-9: 1
SUM OF ALL RESPONSES TO PHQ QUESTIONS 1-9: 1
SUM OF ALL RESPONSES TO PHQ9 QUESTIONS 1 & 2: 1

## 2019-01-14 ASSESSMENT — ENCOUNTER SYMPTOMS
CHEST TIGHTNESS: 0
COUGH: 1
SHORTNESS OF BREATH: 0
RHINORRHEA: 0
WHEEZING: 0
SINUS PAIN: 0

## 2019-01-17 ENCOUNTER — OFFICE VISIT (OUTPATIENT)
Dept: INTERVENTIONAL RADIOLOGY/VASCULAR | Age: 74
End: 2019-01-17
Payer: MEDICARE

## 2019-01-17 VITALS
BODY MASS INDEX: 17.67 KG/M2 | SYSTOLIC BLOOD PRESSURE: 126 MMHG | HEART RATE: 103 BPM | WEIGHT: 96.6 LBS | OXYGEN SATURATION: 95 % | DIASTOLIC BLOOD PRESSURE: 84 MMHG

## 2019-01-17 DIAGNOSIS — C34.91 ADENOCARCINOMA OF RIGHT LUNG (HCC): Primary | ICD-10-CM

## 2019-01-17 DIAGNOSIS — Z72.0 TOBACCO ABUSE: ICD-10-CM

## 2019-01-17 DIAGNOSIS — J44.1 COPD EXACERBATION (HCC): ICD-10-CM

## 2019-01-17 DIAGNOSIS — R91.8 MULTIPLE LUNG NODULES ON CT: ICD-10-CM

## 2019-01-17 PROCEDURE — 1101F PT FALLS ASSESS-DOCD LE1/YR: CPT | Performed by: NURSE PRACTITIONER

## 2019-01-17 PROCEDURE — 1111F DSCHRG MED/CURRENT MED MERGE: CPT | Performed by: NURSE PRACTITIONER

## 2019-01-17 PROCEDURE — 99214 OFFICE O/P EST MOD 30 MIN: CPT | Performed by: NURSE PRACTITIONER

## 2019-01-17 PROCEDURE — 3023F SPIROM DOC REV: CPT | Performed by: NURSE PRACTITIONER

## 2019-01-17 PROCEDURE — 1123F ACP DISCUSS/DSCN MKR DOCD: CPT | Performed by: NURSE PRACTITIONER

## 2019-01-17 PROCEDURE — G8484 FLU IMMUNIZE NO ADMIN: HCPCS | Performed by: NURSE PRACTITIONER

## 2019-01-17 PROCEDURE — G8427 DOCREV CUR MEDS BY ELIG CLIN: HCPCS | Performed by: NURSE PRACTITIONER

## 2019-01-17 PROCEDURE — G8926 SPIRO NO PERF OR DOC: HCPCS | Performed by: NURSE PRACTITIONER

## 2019-01-17 PROCEDURE — 3017F COLORECTAL CA SCREEN DOC REV: CPT | Performed by: NURSE PRACTITIONER

## 2019-01-17 PROCEDURE — G8419 CALC BMI OUT NRM PARAM NOF/U: HCPCS | Performed by: NURSE PRACTITIONER

## 2019-01-17 PROCEDURE — 1036F TOBACCO NON-USER: CPT | Performed by: NURSE PRACTITIONER

## 2019-01-17 PROCEDURE — 4040F PNEUMOC VAC/ADMIN/RCVD: CPT | Performed by: NURSE PRACTITIONER

## 2019-01-17 PROCEDURE — G8400 PT W/DXA NO RESULTS DOC: HCPCS | Performed by: NURSE PRACTITIONER

## 2019-01-17 PROCEDURE — 1090F PRES/ABSN URINE INCON ASSESS: CPT | Performed by: NURSE PRACTITIONER

## 2019-01-17 ASSESSMENT — ENCOUNTER SYMPTOMS
NAUSEA: 0
SORE THROAT: 1
ABDOMINAL PAIN: 0
VOMITING: 0
CONSTIPATION: 0
EYE REDNESS: 0
WHEEZING: 0
EYE ITCHING: 0
COUGH: 1
BACK PAIN: 1
SINUS PRESSURE: 0
SHORTNESS OF BREATH: 1
SINUS PAIN: 0
DIARRHEA: 0
EYE DISCHARGE: 0
EYE PAIN: 0

## 2019-01-18 ENCOUNTER — TELEPHONE (OUTPATIENT)
Dept: INTERVENTIONAL RADIOLOGY/VASCULAR | Age: 74
End: 2019-01-18

## 2019-01-24 PROBLEM — C34.2 MALIGNANT NEOPLASM OF MIDDLE LOBE OF RIGHT LUNG (HCC): Status: ACTIVE | Noted: 2019-01-24

## 2019-01-24 PROBLEM — C78.02 SECONDARY MALIGNANT NEOPLASM OF LEFT LUNG (HCC): Status: ACTIVE | Noted: 2019-01-24

## 2019-01-31 DIAGNOSIS — Z12.11 COLON CANCER SCREENING: ICD-10-CM

## 2019-02-01 ENCOUNTER — HOSPITAL ENCOUNTER (OUTPATIENT)
Dept: CT IMAGING | Age: 74
Discharge: HOME | End: 2019-02-03
Payer: MEDICARE

## 2019-02-01 DIAGNOSIS — C78.02 SECONDARY MALIGNANT NEOPLASM OF LEFT LUNG (HCC): ICD-10-CM

## 2019-02-01 DIAGNOSIS — C34.2 MALIGNANT NEOPLASM OF MIDDLE LOBE OF RIGHT LUNG (HCC): ICD-10-CM

## 2019-02-01 PROCEDURE — 78815 PET IMAGE W/CT SKULL-THIGH: CPT

## 2019-02-01 PROCEDURE — 3430000000 HC RX DIAGNOSTIC RADIOPHARMACEUTICAL: Performed by: INTERNAL MEDICINE

## 2019-02-01 PROCEDURE — A9552 F18 FDG: HCPCS | Performed by: INTERNAL MEDICINE

## 2019-02-01 RX ORDER — FLUDEOXYGLUCOSE F 18 200 MCI/ML
14.7 INJECTION, SOLUTION INTRAVENOUS
Status: COMPLETED | OUTPATIENT
Start: 2019-02-01 | End: 2019-02-01

## 2019-02-01 RX ADMIN — FLUDEOXYGLUCOSE F 18 14.7 MILLICURIE: 200 INJECTION, SOLUTION INTRAVENOUS at 09:27

## 2019-02-14 ENCOUNTER — OFFICE VISIT (OUTPATIENT)
Dept: PULMONOLOGY | Age: 74
End: 2019-02-14
Payer: MEDICARE

## 2019-02-14 VITALS
WEIGHT: 104 LBS | TEMPERATURE: 99.3 F | RESPIRATION RATE: 16 BRPM | DIASTOLIC BLOOD PRESSURE: 66 MMHG | HEIGHT: 61 IN | OXYGEN SATURATION: 96 % | HEART RATE: 98 BPM | BODY MASS INDEX: 19.63 KG/M2 | SYSTOLIC BLOOD PRESSURE: 126 MMHG

## 2019-02-14 DIAGNOSIS — R09.02 HYPOXIA: ICD-10-CM

## 2019-02-14 DIAGNOSIS — J44.9 CHRONIC OBSTRUCTIVE PULMONARY DISEASE, UNSPECIFIED COPD TYPE (HCC): ICD-10-CM

## 2019-02-14 DIAGNOSIS — C34.91 ADENOCARCINOMA OF RIGHT LUNG (HCC): Primary | ICD-10-CM

## 2019-02-14 PROCEDURE — 4040F PNEUMOC VAC/ADMIN/RCVD: CPT | Performed by: INTERNAL MEDICINE

## 2019-02-14 PROCEDURE — 1123F ACP DISCUSS/DSCN MKR DOCD: CPT | Performed by: INTERNAL MEDICINE

## 2019-02-14 PROCEDURE — G8926 SPIRO NO PERF OR DOC: HCPCS | Performed by: INTERNAL MEDICINE

## 2019-02-14 PROCEDURE — 3023F SPIROM DOC REV: CPT | Performed by: INTERNAL MEDICINE

## 2019-02-14 PROCEDURE — 99214 OFFICE O/P EST MOD 30 MIN: CPT | Performed by: INTERNAL MEDICINE

## 2019-02-14 PROCEDURE — 1036F TOBACCO NON-USER: CPT | Performed by: INTERNAL MEDICINE

## 2019-02-14 PROCEDURE — G8420 CALC BMI NORM PARAMETERS: HCPCS | Performed by: INTERNAL MEDICINE

## 2019-02-14 PROCEDURE — 1101F PT FALLS ASSESS-DOCD LE1/YR: CPT | Performed by: INTERNAL MEDICINE

## 2019-02-14 PROCEDURE — G8427 DOCREV CUR MEDS BY ELIG CLIN: HCPCS | Performed by: INTERNAL MEDICINE

## 2019-02-14 PROCEDURE — 3017F COLORECTAL CA SCREEN DOC REV: CPT | Performed by: INTERNAL MEDICINE

## 2019-02-14 PROCEDURE — G8484 FLU IMMUNIZE NO ADMIN: HCPCS | Performed by: INTERNAL MEDICINE

## 2019-02-14 PROCEDURE — G8400 PT W/DXA NO RESULTS DOC: HCPCS | Performed by: INTERNAL MEDICINE

## 2019-02-14 PROCEDURE — 1090F PRES/ABSN URINE INCON ASSESS: CPT | Performed by: INTERNAL MEDICINE

## 2019-02-14 ASSESSMENT — ENCOUNTER SYMPTOMS
WHEEZING: 0
VOICE CHANGE: 0
EYE DISCHARGE: 0
SINUS PRESSURE: 0
TROUBLE SWALLOWING: 0
SORE THROAT: 0
NAUSEA: 0
RHINORRHEA: 0
CHEST TIGHTNESS: 0
COUGH: 0
EYE ITCHING: 0
DIARRHEA: 0
VOMITING: 0
ABDOMINAL PAIN: 0
SHORTNESS OF BREATH: 1

## 2019-02-15 PROBLEM — E53.8 B12 DEFICIENCY: Status: ACTIVE | Noted: 2019-02-15

## 2019-02-26 ENCOUNTER — HOSPITAL ENCOUNTER (OUTPATIENT)
Dept: INFUSION THERAPY | Age: 74
Setting detail: INFUSION SERIES
Discharge: HOME | End: 2019-02-26
Payer: MEDICARE

## 2019-02-26 VITALS
OXYGEN SATURATION: 98 % | TEMPERATURE: 98.2 F | SYSTOLIC BLOOD PRESSURE: 116 MMHG | HEART RATE: 96 BPM | DIASTOLIC BLOOD PRESSURE: 74 MMHG | RESPIRATION RATE: 16 BRPM

## 2019-02-26 DIAGNOSIS — C78.02 SECONDARY MALIGNANT NEOPLASM OF LEFT LUNG (HCC): ICD-10-CM

## 2019-02-26 DIAGNOSIS — C34.2 MALIGNANT NEOPLASM OF MIDDLE LOBE OF RIGHT LUNG (HCC): ICD-10-CM

## 2019-02-26 PROCEDURE — 2580000003 HC RX 258

## 2019-02-26 PROCEDURE — 2580000003 HC RX 258: Performed by: INTERNAL MEDICINE

## 2019-02-26 PROCEDURE — 6360000002 HC RX W HCPCS: Performed by: INTERNAL MEDICINE

## 2019-02-26 PROCEDURE — 96375 TX/PRO/DX INJ NEW DRUG ADDON: CPT

## 2019-02-26 PROCEDURE — 96413 CHEMO IV INFUSION 1 HR: CPT

## 2019-02-26 PROCEDURE — 96365 THER/PROPH/DIAG IV INF INIT: CPT

## 2019-02-26 PROCEDURE — 96417 CHEMO IV INFUS EACH ADDL SEQ: CPT

## 2019-02-26 RX ORDER — SODIUM CHLORIDE 0.9 % (FLUSH) 0.9 %
10 SYRINGE (ML) INJECTION PRN
Status: DISCONTINUED | OUTPATIENT
Start: 2019-02-26 | End: 2019-02-27 | Stop reason: HOSPADM

## 2019-02-26 RX ORDER — SODIUM CHLORIDE 9 MG/ML
INJECTION, SOLUTION INTRAVENOUS
Status: COMPLETED
Start: 2019-02-26 | End: 2019-02-26

## 2019-02-26 RX ORDER — PALONOSETRON 0.05 MG/ML
0.25 INJECTION, SOLUTION INTRAVENOUS ONCE
Status: COMPLETED | OUTPATIENT
Start: 2019-02-26 | End: 2019-02-26

## 2019-02-26 RX ORDER — SODIUM CHLORIDE 9 MG/ML
INJECTION, SOLUTION INTRAVENOUS ONCE
Status: COMPLETED | OUTPATIENT
Start: 2019-02-26 | End: 2019-02-26

## 2019-02-26 RX ADMIN — SODIUM CHLORIDE 250 ML: 9 INJECTION, SOLUTION INTRAVENOUS at 09:41

## 2019-02-26 RX ADMIN — PALONOSETRON 0.25 MG: 0.05 INJECTION, SOLUTION INTRAVENOUS at 09:55

## 2019-02-26 RX ADMIN — SODIUM CHLORIDE 567.5 MG: 9 INJECTION, SOLUTION INTRAVENOUS at 11:39

## 2019-02-26 RX ADMIN — DEXAMETHASONE SODIUM PHOSPHATE 12 MG: 4 INJECTION, SOLUTION INTRA-ARTICULAR; INTRALESIONAL; INTRAMUSCULAR; INTRAVENOUS; SOFT TISSUE at 10:02

## 2019-02-26 RX ADMIN — CARBOPLATIN 432 MG: 10 INJECTION, SOLUTION INTRAVENOUS at 12:04

## 2019-02-26 RX ADMIN — SODIUM CHLORIDE 150 MG: 9 INJECTION, SOLUTION INTRAVENOUS at 10:22

## 2019-02-26 RX ADMIN — Medication 10 ML: at 09:40

## 2019-02-26 RX ADMIN — SODIUM CHLORIDE 200 MG: 9 INJECTION, SOLUTION INTRAVENOUS at 11:02

## 2019-02-26 NOTE — PROGRESS NOTES
Pt tolerated treatment well. Had stated that had plenty of info about meds and care. Declined more info. AVS given to pt. No c/o or requests.

## 2019-02-26 NOTE — PROGRESS NOTES
Pt c/o of SOB. States uses oxygenator. Has with her. States uses at 2l /min. Placed o2 on . PLAN:. Ox is 98%.

## 2019-03-19 ENCOUNTER — HOSPITAL ENCOUNTER (OUTPATIENT)
Dept: INFUSION THERAPY | Age: 74
Setting detail: INFUSION SERIES
Discharge: HOME | End: 2019-03-19
Payer: MEDICARE

## 2019-03-19 VITALS
TEMPERATURE: 97.1 F | HEART RATE: 97 BPM | SYSTOLIC BLOOD PRESSURE: 143 MMHG | DIASTOLIC BLOOD PRESSURE: 63 MMHG | RESPIRATION RATE: 18 BRPM

## 2019-03-19 DIAGNOSIS — C78.02 SECONDARY MALIGNANT NEOPLASM OF LEFT LUNG (HCC): ICD-10-CM

## 2019-03-19 DIAGNOSIS — C34.2 MALIGNANT NEOPLASM OF MIDDLE LOBE OF RIGHT LUNG (HCC): Primary | ICD-10-CM

## 2019-03-19 PROCEDURE — 2580000003 HC RX 258: Performed by: INTERNAL MEDICINE

## 2019-03-19 PROCEDURE — 96413 CHEMO IV INFUSION 1 HR: CPT

## 2019-03-19 PROCEDURE — 96409 CHEMO IV PUSH SNGL DRUG: CPT

## 2019-03-19 PROCEDURE — 96417 CHEMO IV INFUS EACH ADDL SEQ: CPT

## 2019-03-19 PROCEDURE — 6360000002 HC RX W HCPCS: Performed by: INTERNAL MEDICINE

## 2019-03-19 PROCEDURE — 96375 TX/PRO/DX INJ NEW DRUG ADDON: CPT

## 2019-03-19 PROCEDURE — 96367 TX/PROPH/DG ADDL SEQ IV INF: CPT

## 2019-03-19 PROCEDURE — 2580000003 HC RX 258

## 2019-03-19 PROCEDURE — 96365 THER/PROPH/DIAG IV INF INIT: CPT

## 2019-03-19 RX ORDER — SODIUM CHLORIDE 0.9 % (FLUSH) 0.9 %
10 SYRINGE (ML) INJECTION PRN
Status: DISCONTINUED | OUTPATIENT
Start: 2019-03-19 | End: 2019-03-20 | Stop reason: HOSPADM

## 2019-03-19 RX ORDER — SODIUM CHLORIDE 9 MG/ML
INJECTION, SOLUTION INTRAVENOUS
Status: COMPLETED
Start: 2019-03-19 | End: 2019-03-19

## 2019-03-19 RX ORDER — PALONOSETRON 0.05 MG/ML
0.25 INJECTION, SOLUTION INTRAVENOUS ONCE
Status: COMPLETED | OUTPATIENT
Start: 2019-03-19 | End: 2019-03-19

## 2019-03-19 RX ORDER — SODIUM CHLORIDE 9 MG/ML
INJECTION, SOLUTION INTRAVENOUS ONCE
Status: COMPLETED | OUTPATIENT
Start: 2019-03-19 | End: 2019-03-19

## 2019-03-19 RX ADMIN — SODIUM CHLORIDE: 9 INJECTION, SOLUTION INTRAVENOUS at 10:33

## 2019-03-19 RX ADMIN — CARBOPLATIN 376 MG: 10 INJECTION, SOLUTION INTRAVENOUS at 12:46

## 2019-03-19 RX ADMIN — PALONOSETRON 0.25 MG: 0.05 INJECTION, SOLUTION INTRAVENOUS at 10:33

## 2019-03-19 RX ADMIN — SODIUM CHLORIDE 150 MG: 9 INJECTION, SOLUTION INTRAVENOUS at 10:57

## 2019-03-19 RX ADMIN — DEXAMETHASONE SODIUM PHOSPHATE 12 MG: 4 INJECTION, SOLUTION INTRAMUSCULAR; INTRAVENOUS at 10:34

## 2019-03-19 RX ADMIN — SODIUM CHLORIDE 200 MG: 9 INJECTION, SOLUTION INTRAVENOUS at 11:40

## 2019-03-19 RX ADMIN — SODIUM CHLORIDE 567.5 MG: 9 INJECTION, SOLUTION INTRAVENOUS at 12:25

## 2019-03-19 NOTE — FLOWSHEET NOTE
Infusion complete. Tolerated well. Declined AVS. Ambulated off of the unit. All equipment used in the care for this patient has been cleaned.

## 2019-03-19 NOTE — FLOWSHEET NOTE
Patient to the floor ambulatory for her chemo treatment. Vital signs taken. Denies any discomfort. Call light within reach.

## 2019-03-25 RX ORDER — IPRATROPIUM BROMIDE AND ALBUTEROL SULFATE 2.5; .5 MG/3ML; MG/3ML
3 SOLUTION RESPIRATORY (INHALATION) EVERY 6 HOURS
Qty: 360 ML | Refills: 1 | Status: ON HOLD | OUTPATIENT
Start: 2019-03-25 | End: 2019-04-19 | Stop reason: ALTCHOICE

## 2019-03-26 ENCOUNTER — HOSPITAL ENCOUNTER (EMERGENCY)
Age: 74
Discharge: HOME | End: 2019-03-26
Attending: EMERGENCY MEDICINE
Payer: MEDICARE

## 2019-03-26 ENCOUNTER — APPOINTMENT (OUTPATIENT)
Dept: GENERAL RADIOLOGY | Age: 74
End: 2019-03-26
Payer: MEDICARE

## 2019-03-26 VITALS
HEIGHT: 62 IN | RESPIRATION RATE: 18 BRPM | OXYGEN SATURATION: 97 % | BODY MASS INDEX: 18.95 KG/M2 | DIASTOLIC BLOOD PRESSURE: 68 MMHG | HEART RATE: 94 BPM | TEMPERATURE: 98.1 F | WEIGHT: 103 LBS | SYSTOLIC BLOOD PRESSURE: 107 MMHG

## 2019-03-26 DIAGNOSIS — J44.1 COPD EXACERBATION (HCC): ICD-10-CM

## 2019-03-26 DIAGNOSIS — J06.9 URI WITH COUGH AND CONGESTION: Primary | ICD-10-CM

## 2019-03-26 LAB
ANION GAP SERPL CALCULATED.3IONS-SCNC: 13 MEQ/L (ref 9–15)
BASOPHILS ABSOLUTE: 0 K/UL (ref 0–0.2)
BASOPHILS RELATIVE PERCENT: 0.3 %
BUN BLDV-MCNC: 13 MG/DL (ref 8–23)
CALCIUM SERPL-MCNC: 8.9 MG/DL (ref 8.5–9.9)
CHLORIDE BLD-SCNC: 102 MEQ/L (ref 95–107)
CO2: 22 MEQ/L (ref 20–31)
CREAT SERPL-MCNC: 0.43 MG/DL (ref 0.5–0.9)
EKG ATRIAL RATE: 89 BPM
EKG P AXIS: 70 DEGREES
EKG P-R INTERVAL: 144 MS
EKG Q-T INTERVAL: 378 MS
EKG QRS DURATION: 86 MS
EKG QTC CALCULATION (BAZETT): 459 MS
EKG R AXIS: 11 DEGREES
EKG T AXIS: 55 DEGREES
EKG VENTRICULAR RATE: 89 BPM
EOSINOPHILS ABSOLUTE: 0.1 K/UL (ref 0–0.7)
EOSINOPHILS RELATIVE PERCENT: 2.3 %
GFR AFRICAN AMERICAN: >60
GFR NON-AFRICAN AMERICAN: >60
GLUCOSE BLD-MCNC: 96 MG/DL (ref 70–99)
HCT VFR BLD CALC: 33.5 % (ref 37–47)
HEMOGLOBIN: 11.7 G/DL (ref 12–16)
LYMPHOCYTES ABSOLUTE: 0.5 K/UL (ref 1–4.8)
LYMPHOCYTES RELATIVE PERCENT: 14.5 %
MCH RBC QN AUTO: 32.4 PG (ref 27–31.3)
MCHC RBC AUTO-ENTMCNC: 35 % (ref 33–37)
MCV RBC AUTO: 92.5 FL (ref 82–100)
MONOCYTES ABSOLUTE: 0.6 K/UL (ref 0.2–0.8)
MONOCYTES RELATIVE PERCENT: 16.1 %
NEUTROPHILS ABSOLUTE: 2.3 K/UL (ref 1.4–6.5)
NEUTROPHILS RELATIVE PERCENT: 66.8 %
PDW BLD-RTO: 14 % (ref 11.5–14.5)
PLATELET # BLD: 175 K/UL (ref 130–400)
PLATELET SLIDE REVIEW: ADEQUATE
POTASSIUM SERPL-SCNC: 4.2 MEQ/L (ref 3.4–4.9)
RBC # BLD: 3.62 M/UL (ref 4.2–5.4)
SLIDE REVIEW: ABNORMAL
SODIUM BLD-SCNC: 137 MEQ/L (ref 135–144)
TROPONIN: <0.01 NG/ML (ref 0–0.01)
WBC # BLD: 3.4 K/UL (ref 4.8–10.8)

## 2019-03-26 PROCEDURE — 80048 BASIC METABOLIC PNL TOTAL CA: CPT

## 2019-03-26 PROCEDURE — 71045 X-RAY EXAM CHEST 1 VIEW: CPT

## 2019-03-26 PROCEDURE — 94640 AIRWAY INHALATION TREATMENT: CPT

## 2019-03-26 PROCEDURE — 84484 ASSAY OF TROPONIN QUANT: CPT

## 2019-03-26 PROCEDURE — 96374 THER/PROPH/DIAG INJ IV PUSH: CPT

## 2019-03-26 PROCEDURE — 99284 EMERGENCY DEPT VISIT MOD MDM: CPT

## 2019-03-26 PROCEDURE — 6360000002 HC RX W HCPCS: Performed by: EMERGENCY MEDICINE

## 2019-03-26 PROCEDURE — 36415 COLL VENOUS BLD VENIPUNCTURE: CPT

## 2019-03-26 PROCEDURE — 93005 ELECTROCARDIOGRAM TRACING: CPT

## 2019-03-26 PROCEDURE — 85025 COMPLETE CBC W/AUTO DIFF WBC: CPT

## 2019-03-26 RX ORDER — ALBUTEROL SULFATE 2.5 MG/3ML
2.5 SOLUTION RESPIRATORY (INHALATION) EVERY 6 HOURS PRN
Qty: 120 EACH | Refills: 1 | Status: SHIPPED | OUTPATIENT
Start: 2019-03-26

## 2019-03-26 RX ORDER — AZITHROMYCIN 250 MG/1
TABLET, FILM COATED ORAL
Qty: 1 PACKET | Refills: 0 | Status: SHIPPED | OUTPATIENT
Start: 2019-03-26 | End: 2019-03-30

## 2019-03-26 RX ORDER — KETOROLAC TROMETHAMINE 30 MG/ML
30 INJECTION, SOLUTION INTRAMUSCULAR; INTRAVENOUS ONCE
Status: COMPLETED | OUTPATIENT
Start: 2019-03-26 | End: 2019-03-26

## 2019-03-26 RX ORDER — PREDNISONE 20 MG/1
20 TABLET ORAL 2 TIMES DAILY
Qty: 10 TABLET | Refills: 0 | Status: SHIPPED | OUTPATIENT
Start: 2019-03-26 | End: 2019-03-31

## 2019-03-26 RX ADMIN — IPRATROPIUM BROMIDE 0.5 MG: 0.5 SOLUTION RESPIRATORY (INHALATION) at 10:30

## 2019-03-26 RX ADMIN — ALBUTEROL SULFATE 5 MG: 2.5 SOLUTION RESPIRATORY (INHALATION) at 10:30

## 2019-03-26 RX ADMIN — KETOROLAC TROMETHAMINE 30 MG: 30 INJECTION, SOLUTION INTRAMUSCULAR; INTRAVENOUS at 10:24

## 2019-03-26 ASSESSMENT — PAIN DESCRIPTION - LOCATION: LOCATION: CHEST;RIB CAGE

## 2019-03-26 ASSESSMENT — ENCOUNTER SYMPTOMS
ABDOMINAL DISTENTION: 0
BACK PAIN: 0
SORE THROAT: 0
SINUS PAIN: 0
ABDOMINAL PAIN: 0
BLOOD IN STOOL: 0
GASTROINTESTINAL NEGATIVE: 1
VOMITING: 0
CHEST TIGHTNESS: 0
SHORTNESS OF BREATH: 1
EYE PAIN: 0
NAUSEA: 0
WHEEZING: 0
DIARRHEA: 0
EYES NEGATIVE: 1
COUGH: 1
EYE DISCHARGE: 0

## 2019-03-26 ASSESSMENT — PAIN DESCRIPTION - FREQUENCY: FREQUENCY: INTERMITTENT

## 2019-03-26 ASSESSMENT — PAIN SCALES - GENERAL
PAINLEVEL_OUTOF10: 5
PAINLEVEL_OUTOF10: 8

## 2019-03-26 ASSESSMENT — PAIN DESCRIPTION - DESCRIPTORS: DESCRIPTORS: PATIENT UNABLE TO DESCRIBE

## 2019-03-29 ENCOUNTER — OFFICE VISIT (OUTPATIENT)
Dept: INTERNAL MEDICINE | Age: 74
End: 2019-03-29
Payer: MEDICARE

## 2019-03-29 VITALS
HEART RATE: 82 BPM | WEIGHT: 105 LBS | SYSTOLIC BLOOD PRESSURE: 110 MMHG | DIASTOLIC BLOOD PRESSURE: 64 MMHG | BODY MASS INDEX: 19.32 KG/M2 | TEMPERATURE: 98.6 F | OXYGEN SATURATION: 98 % | HEIGHT: 62 IN

## 2019-03-29 DIAGNOSIS — J06.9 ACUTE URI: Primary | ICD-10-CM

## 2019-03-29 PROCEDURE — G8484 FLU IMMUNIZE NO ADMIN: HCPCS | Performed by: PHYSICIAN ASSISTANT

## 2019-03-29 PROCEDURE — G8400 PT W/DXA NO RESULTS DOC: HCPCS | Performed by: PHYSICIAN ASSISTANT

## 2019-03-29 PROCEDURE — G8427 DOCREV CUR MEDS BY ELIG CLIN: HCPCS | Performed by: PHYSICIAN ASSISTANT

## 2019-03-29 PROCEDURE — 1090F PRES/ABSN URINE INCON ASSESS: CPT | Performed by: PHYSICIAN ASSISTANT

## 2019-03-29 PROCEDURE — 1036F TOBACCO NON-USER: CPT | Performed by: PHYSICIAN ASSISTANT

## 2019-03-29 PROCEDURE — 1123F ACP DISCUSS/DSCN MKR DOCD: CPT | Performed by: PHYSICIAN ASSISTANT

## 2019-03-29 PROCEDURE — 99213 OFFICE O/P EST LOW 20 MIN: CPT | Performed by: PHYSICIAN ASSISTANT

## 2019-03-29 PROCEDURE — 3017F COLORECTAL CA SCREEN DOC REV: CPT | Performed by: PHYSICIAN ASSISTANT

## 2019-03-29 PROCEDURE — G8420 CALC BMI NORM PARAMETERS: HCPCS | Performed by: PHYSICIAN ASSISTANT

## 2019-03-29 PROCEDURE — 4040F PNEUMOC VAC/ADMIN/RCVD: CPT | Performed by: PHYSICIAN ASSISTANT

## 2019-03-29 ASSESSMENT — ENCOUNTER SYMPTOMS
WHEEZING: 0
SHORTNESS OF BREATH: 0
CHEST TIGHTNESS: 0
COUGH: 1

## 2019-03-29 NOTE — PROGRESS NOTES
3/29/2019     Willy Hogue (:  1945) is a 75 y.o. female, here for evaluation of the following medical concerns:    Chief Complaint   Patient presents with   [address] from [facility]     3/26/19 [facility] ER, pt was given antibiotics, pt states she feels a little better. [facility]      ER follow UP:  Patient is here for a follow up after an ER visit at [facility] on 3/26  . She went to the ER for cough, diagnosed with URI. Labs and CXR was done and results were metastatic nodules again seen  Pt is undergoing chemo for primary lung cancer . Stopped smoking 2019   She states Her condition has significantly improved, phlegm is clear, chest hurts when coughing, no SOB   New treatment includes Zpak and prednisone, and inhalers . Review of Systems   Constitutional: Negative for chills, fatigue and fever. HENT: Negative for congestion. Respiratory: Positive for cough. Negative for chest tightness, shortness of breath and wheezing. Prior to Visit Medications    Medication Sig Taking?  Authorizing Provider   albuterol (PROVENTIL) (2.5 MG/3ML) 0.083% nebulizer solution Take 3 mLs by nebulization every 6 hours as needed for Wheezing Yes Antonio Billings,    ipratropium-albuterol (DUONEB) 0.5-2.5 (3) MG/3ML SOLN nebulizer solution Inhale 3 mLs into the lungs every 6 hours Yes Wade Brunner MD   acetaminophen (TYLENOL) 500 MG tablet Take 1,000 mg by mouth every 6 hours as needed for Pain Yes Historical Provider, MD   magnesium hydroxide (MILK OF MAGNESIA) 400 MG/5ML suspension Take 30 mLs by mouth daily as needed for Constipation Yes Historical Provider, MD   folic acid (FOLVITE) 1 MG tablet Take 1 tablet by mouth daily Yes Brett Combs, DO   Multiple Vitamins-Minerals (CENTRUM SILVER 50+WOMEN) TABS Take 1 tablet by mouth daily Yes Historical Provider, MD   tiotropium (SPIRIVA RESPIMAT) 2.5 MCG/ACT AERS inhaler Inhale 2 puffs into the lungs daily Yes Michelle Marrero MD   amLODIPine (NORVASC) 5 MG tablet Take 1 tablet by mouth daily Yes Rikki Cullen MD   prochlorperazine (COMPAZINE) 10 MG tablet Take 1 tablet by mouth every 6 hours as needed (nausea)  Juan Rios DO        Social History     Tobacco Use    Smoking status: Former Smoker     Packs/day: 1.00     Years: 60.00     Pack years: 60.00     Types: Cigarettes     Start date: 1/10/1964     Last attempt to quit: 2019     Years since quittin.2    Smokeless tobacco: Never Used   Substance Use Topics    Alcohol use: Not Currently     Alcohol/week: 9.0 oz     Types: 15 Cans of beer per week     Comment: per pt has not had any since Dec 28 2018        Vitals:    19 1441   BP: 110/64   Site: Left Upper Arm   Position: Sitting   Cuff Size: Medium Adult   Pulse: 82   Temp: 98.6 °F (37 °C)   TempSrc: Temporal   SpO2: 98%   Weight: 105 lb (47.6 kg)   Height: 5' 2\" (1.575 m)     Estimated body mass index is 19.2 kg/m² as calculated from the following:    Height as of this encounter: 5' 2\" (1.575 m). Weight as of this encounter: 105 lb (47.6 kg). Physical Exam   Constitutional: She appears well-developed and well-nourished. HENT:   Head: Normocephalic and atraumatic. Nose: Nose normal.   Mouth/Throat: Oropharynx is clear and moist.   Eyes: Pupils are equal, round, and reactive to light. Conjunctivae and EOM are normal.   Neck: Normal range of motion. Neck supple. Cardiovascular: Normal rate. Pulmonary/Chest: Effort normal and breath sounds normal. She exhibits no tenderness. ASSESSMENT/PLAN:  1. Acute URI  -  improving  - continue current treatment, rest and increase fluids       No follow-ups on file. An electronic signature was used to authenticate this note.     --DERECK Phelan on 2019 at 9:01 AM

## 2019-04-03 ENCOUNTER — HOSPITAL ENCOUNTER (OUTPATIENT)
Dept: CT IMAGING | Age: 74
Discharge: HOME | End: 2019-04-05
Payer: MEDICARE

## 2019-04-03 DIAGNOSIS — C34.2 MALIGNANT NEOPLASM OF MIDDLE LOBE OF RIGHT LUNG (HCC): ICD-10-CM

## 2019-04-03 DIAGNOSIS — C78.02 SECONDARY MALIGNANT NEOPLASM OF LEFT LUNG (HCC): ICD-10-CM

## 2019-04-03 PROCEDURE — 70470 CT HEAD/BRAIN W/O & W/DYE: CPT

## 2019-04-03 PROCEDURE — 6360000004 HC RX CONTRAST MEDICATION: Performed by: INTERNAL MEDICINE

## 2019-04-03 RX ADMIN — IOPAMIDOL 100 ML: 755 INJECTION, SOLUTION INTRAVENOUS at 09:45

## 2019-04-04 DIAGNOSIS — C78.02 SECONDARY MALIGNANT NEOPLASM OF LEFT LUNG (HCC): ICD-10-CM

## 2019-04-04 DIAGNOSIS — E53.8 B12 DEFICIENCY: ICD-10-CM

## 2019-04-04 DIAGNOSIS — C34.2 MALIGNANT NEOPLASM OF MIDDLE LOBE OF RIGHT LUNG (HCC): ICD-10-CM

## 2019-04-04 LAB
ALBUMIN SERPL-MCNC: 3.7 G/DL (ref 3.5–4.6)
ALP BLD-CCNC: 71 U/L (ref 40–130)
ALT SERPL-CCNC: 27 U/L (ref 0–33)
ANION GAP SERPL CALCULATED.3IONS-SCNC: 16 MEQ/L (ref 9–15)
AST SERPL-CCNC: 25 U/L (ref 0–35)
BILIRUB SERPL-MCNC: <0.2 MG/DL (ref 0.2–0.7)
BUN BLDV-MCNC: 13 MG/DL (ref 8–23)
CALCIUM SERPL-MCNC: 8.9 MG/DL (ref 8.5–9.9)
CHLORIDE BLD-SCNC: 104 MEQ/L (ref 95–107)
CO2: 20 MEQ/L (ref 20–31)
CREAT SERPL-MCNC: 0.49 MG/DL (ref 0.5–0.9)
GFR AFRICAN AMERICAN: >60
GFR NON-AFRICAN AMERICAN: >60
GLOBULIN: 3 G/DL (ref 2.3–3.5)
GLUCOSE BLD-MCNC: 85 MG/DL (ref 70–99)
POTASSIUM SERPL-SCNC: 4.4 MEQ/L (ref 3.4–4.9)
SODIUM BLD-SCNC: 140 MEQ/L (ref 135–144)
TOTAL PROTEIN: 6.7 G/DL (ref 6.3–8)

## 2019-04-09 ENCOUNTER — HOSPITAL ENCOUNTER (OUTPATIENT)
Dept: INFUSION THERAPY | Age: 74
Setting detail: INFUSION SERIES
Discharge: HOME | End: 2019-04-09
Payer: MEDICARE

## 2019-04-09 VITALS
SYSTOLIC BLOOD PRESSURE: 137 MMHG | HEART RATE: 98 BPM | TEMPERATURE: 98.3 F | BODY MASS INDEX: 18.9 KG/M2 | WEIGHT: 103.31 LBS | DIASTOLIC BLOOD PRESSURE: 77 MMHG | RESPIRATION RATE: 16 BRPM

## 2019-04-09 DIAGNOSIS — C78.02 SECONDARY MALIGNANT NEOPLASM OF LEFT LUNG (HCC): ICD-10-CM

## 2019-04-09 DIAGNOSIS — C34.2 MALIGNANT NEOPLASM OF MIDDLE LOBE OF RIGHT LUNG (HCC): Primary | ICD-10-CM

## 2019-04-09 PROCEDURE — 96375 TX/PRO/DX INJ NEW DRUG ADDON: CPT

## 2019-04-09 PROCEDURE — 96413 CHEMO IV INFUSION 1 HR: CPT

## 2019-04-09 PROCEDURE — 96372 THER/PROPH/DIAG INJ SC/IM: CPT

## 2019-04-09 PROCEDURE — 2580000003 HC RX 258: Performed by: INTERNAL MEDICINE

## 2019-04-09 PROCEDURE — 96365 THER/PROPH/DIAG IV INF INIT: CPT

## 2019-04-09 PROCEDURE — 6360000002 HC RX W HCPCS: Performed by: INTERNAL MEDICINE

## 2019-04-09 PROCEDURE — 2580000003 HC RX 258

## 2019-04-09 PROCEDURE — 96409 CHEMO IV PUSH SNGL DRUG: CPT

## 2019-04-09 PROCEDURE — 96417 CHEMO IV INFUS EACH ADDL SEQ: CPT

## 2019-04-09 RX ORDER — SODIUM CHLORIDE 9 MG/ML
INJECTION, SOLUTION INTRAVENOUS ONCE
Status: COMPLETED | OUTPATIENT
Start: 2019-04-09 | End: 2019-04-09

## 2019-04-09 RX ORDER — PALONOSETRON 0.05 MG/ML
0.25 INJECTION, SOLUTION INTRAVENOUS ONCE
Status: COMPLETED | OUTPATIENT
Start: 2019-04-09 | End: 2019-04-09

## 2019-04-09 RX ORDER — SODIUM CHLORIDE 0.9 % (FLUSH) 0.9 %
10 SYRINGE (ML) INJECTION PRN
Status: DISCONTINUED | OUTPATIENT
Start: 2019-04-09 | End: 2019-04-10 | Stop reason: HOSPADM

## 2019-04-09 RX ORDER — SODIUM CHLORIDE 9 MG/ML
INJECTION, SOLUTION INTRAVENOUS
Status: COMPLETED
Start: 2019-04-09 | End: 2019-04-09

## 2019-04-09 RX ORDER — CYANOCOBALAMIN 1000 UG/ML
1000 INJECTION INTRAMUSCULAR; SUBCUTANEOUS ONCE
Status: COMPLETED | OUTPATIENT
Start: 2019-04-09 | End: 2019-04-09

## 2019-04-09 RX ADMIN — CYANOCOBALAMIN 1000 MCG: 1000 INJECTION INTRAMUSCULAR; SUBCUTANEOUS at 09:48

## 2019-04-09 RX ADMIN — SODIUM CHLORIDE 250 ML: 9 INJECTION, SOLUTION INTRAVENOUS at 09:43

## 2019-04-09 RX ADMIN — CARBOPLATIN 392 MG: 10 INJECTION, SOLUTION INTRAVENOUS at 11:46

## 2019-04-09 RX ADMIN — SODIUM CHLORIDE 567.5 MG: 9 INJECTION, SOLUTION INTRAVENOUS at 11:29

## 2019-04-09 RX ADMIN — SODIUM CHLORIDE 150 MG: 9 INJECTION, SOLUTION INTRAVENOUS at 10:00

## 2019-04-09 RX ADMIN — SODIUM CHLORIDE 200 MG: 9 INJECTION, SOLUTION INTRAVENOUS at 10:58

## 2019-04-09 RX ADMIN — PALONOSETRON 0.25 MG: 0.05 INJECTION, SOLUTION INTRAVENOUS at 09:44

## 2019-04-09 RX ADMIN — DEXAMETHASONE SODIUM PHOSPHATE 12 MG: 4 INJECTION, SOLUTION INTRAMUSCULAR; INTRAVENOUS at 10:32

## 2019-04-09 RX ADMIN — Medication 10 ML: at 09:31

## 2019-04-09 NOTE — PROGRESS NOTES
Pt to [facility] for chemo. States had respiratory problems, was on atbx, states feels better, but not feeling right. Stated told doctor at visit. Temp was at first low grade. Encouraged fluids. Room is very warm. Added fan to room. Retook temp after fluids(10 minutes after.) temp normal now. Pt states is also constipated.

## 2019-04-09 NOTE — PROGRESS NOTES
Chemo complete and tolerated well  Left unit walking with spouse  All equipment used in the care for this patient has been cleaned.

## 2019-04-15 ENCOUNTER — OFFICE VISIT (OUTPATIENT)
Dept: PULMONOLOGY | Age: 74
End: 2019-04-15
Payer: MEDICARE

## 2019-04-15 VITALS
TEMPERATURE: 97.8 F | OXYGEN SATURATION: 95 % | BODY MASS INDEX: 17.3 KG/M2 | SYSTOLIC BLOOD PRESSURE: 120 MMHG | WEIGHT: 94 LBS | DIASTOLIC BLOOD PRESSURE: 64 MMHG | HEART RATE: 86 BPM | RESPIRATION RATE: 16 BRPM | HEIGHT: 62 IN

## 2019-04-15 DIAGNOSIS — R09.02 HYPOXIA: ICD-10-CM

## 2019-04-15 DIAGNOSIS — J44.9 CHRONIC OBSTRUCTIVE PULMONARY DISEASE, UNSPECIFIED COPD TYPE (HCC): ICD-10-CM

## 2019-04-15 DIAGNOSIS — C34.91 ADENOCARCINOMA OF RIGHT LUNG (HCC): Primary | ICD-10-CM

## 2019-04-15 PROCEDURE — 1090F PRES/ABSN URINE INCON ASSESS: CPT | Performed by: INTERNAL MEDICINE

## 2019-04-15 PROCEDURE — 4040F PNEUMOC VAC/ADMIN/RCVD: CPT | Performed by: INTERNAL MEDICINE

## 2019-04-15 PROCEDURE — 3017F COLORECTAL CA SCREEN DOC REV: CPT | Performed by: INTERNAL MEDICINE

## 2019-04-15 PROCEDURE — 99214 OFFICE O/P EST MOD 30 MIN: CPT | Performed by: INTERNAL MEDICINE

## 2019-04-15 PROCEDURE — G8427 DOCREV CUR MEDS BY ELIG CLIN: HCPCS | Performed by: INTERNAL MEDICINE

## 2019-04-15 PROCEDURE — 1036F TOBACCO NON-USER: CPT | Performed by: INTERNAL MEDICINE

## 2019-04-15 PROCEDURE — G8926 SPIRO NO PERF OR DOC: HCPCS | Performed by: INTERNAL MEDICINE

## 2019-04-15 PROCEDURE — G8419 CALC BMI OUT NRM PARAM NOF/U: HCPCS | Performed by: INTERNAL MEDICINE

## 2019-04-15 PROCEDURE — G8400 PT W/DXA NO RESULTS DOC: HCPCS | Performed by: INTERNAL MEDICINE

## 2019-04-15 PROCEDURE — 3023F SPIROM DOC REV: CPT | Performed by: INTERNAL MEDICINE

## 2019-04-15 PROCEDURE — 1123F ACP DISCUSS/DSCN MKR DOCD: CPT | Performed by: INTERNAL MEDICINE

## 2019-04-15 RX ORDER — DOXYCYCLINE HYCLATE 100 MG
100 TABLET ORAL 2 TIMES DAILY
Qty: 20 TABLET | Refills: 0 | Status: ON HOLD | OUTPATIENT
Start: 2019-04-15 | End: 2019-04-26 | Stop reason: HOSPADM

## 2019-04-15 ASSESSMENT — ENCOUNTER SYMPTOMS
ABDOMINAL PAIN: 0
SHORTNESS OF BREATH: 1
VOMITING: 0
COUGH: 1
CHEST TIGHTNESS: 0
VOICE CHANGE: 0
RHINORRHEA: 0
EYE DISCHARGE: 0
TROUBLE SWALLOWING: 0
SINUS PRESSURE: 0
SORE THROAT: 0
WHEEZING: 1
EYE ITCHING: 0
NAUSEA: 0
DIARRHEA: 0

## 2019-04-15 NOTE — PROGRESS NOTES
activity:     Days per week: Not on file     Minutes per session: Not on file    Stress: Not on file   Relationships    Social connections:     Talks on phone: Not on file     Gets together: Not on file     Attends [employer] service: Not on file     Active member of club or organization: Not on file     Attends meetings of clubs or organizations: Not on file     Relationship status: Not on file    Intimate partner violence:     Fear of current or ex partner: Not on file     Emotionally abused: Not on file     Physically abused: Not on file     Forced sexual activity: Not on file   Other Topics Concern    Not on file   Social History Narrative    Not on file         Review of Systems   Constitutional: Negative for chills, diaphoresis, fatigue and fever. HENT: Negative for congestion, mouth sores, nosebleeds, postnasal drip, rhinorrhea, sinus pressure, sneezing, sore throat, trouble swallowing and voice change. Eyes: Negative for discharge, itching and visual disturbance. Respiratory: Positive for cough, shortness of breath and wheezing. Negative for chest tightness. Cardiovascular: Positive for chest pain. Negative for palpitations and leg swelling. Gastrointestinal: Negative for abdominal pain, diarrhea, nausea and vomiting. Genitourinary: Negative for difficulty urinating and hematuria. Musculoskeletal: Negative for arthralgias, joint swelling and myalgias. Skin: Negative for rash. Allergic/Immunologic: Negative for environmental allergies and food allergies. Neurological: Negative for dizziness, tremors, weakness and headaches. Psychiatric/Behavioral: Negative for behavioral problems and sleep disturbance.          Objective:     Vitals:    04/15/19 1133 04/15/19 1141   BP: 120/64    Pulse: 86    Resp: 16    Temp: 97.8 °F (36.6 °C)    TempSrc: Tympanic    SpO2: (!) 80% 95%   Weight: 94 lb (42.6 kg)    Height: 5' 2\" (1.575 m)          Physical Exam   Constitutional: She is oriented to contrast    History: Abnormality seen on chest x-ray. Lung mass. Technique: Multiple contiguous axial images of the chest were obtained from the thoracic inlet through the upper abdomen with IV contrast. Multiplanar reformats and axial and coronal maximum intensity projection images were obtained. Comparison: Chest radiograph performed earlier on the same date    Findings:     Small calcified nodule identified within the right lobe of the thyroid gland measuring approximately 7 mm. No axillary, mediastinal, or hilar lymphadenopathy. No thoracic aortic aneurysm or dissection. Heart size is within normal limits. No significant   pericardial effusion. Esophagus is within normal limits. 2 cm spiculated mass within the posterior right lower lobe abuts the pleura as seen on axial series 3 image 25. Spiculated 1.8 cm mass within the posterior left lower lobe as seen on axial series 3 image 36. 2.2 cm pulmonary mass with smooth borders   identified within the posterior left lower lobe as seen on axial series 3 image 40. 9 mm spiculated pulmonary nodule within the left lower lobe as seen on axial series 3 image 38. 7 mm spiculated nodule within the left upper lobe as seen on axial series   3 image 28 Advanced emphysema. No pneumothorax or pleural effusion. Airways are patent. No bronchiectasis. Small hiatal hernia. 2 cm fluid attenuation structure within the left lobe of the liver and 1.2 cm fluid attenuation structure within the left lower lobe liver compatible with hepatic cyst. 3 additional subcentimeter hypodense structures scattered   throughout the liver are too small to accurately characterize but likely represent small cyst or hemangiomas. Osteopenia. No acute osseous abnormality. Impression Bilateral pulmonary nodules and masses as detailed above, the largest in the right lung measuring 1.8 cm and largest in the left lung measures 2.2 cm.  While these may be infectious or inflammatory in etiology, neoplasm must be excluded. Advanced emphysema. All CT scans at this facility use dose modulation, iterative reconstruction, and/or weight based dosing when appropriate to reduce radiation dose to as low as reasonably achievable.     ]    Assessment/Plan:     1. Adenocarcinoma of right lung [facility]) with metastasis  She is is currently being treated with chemotherapy by oncologist.  Patient is following with Dr. Carlos Adames, she said she was recently in emergency room and she was given Zithromax and prednisone but she is still coughing with thick mucus and having pain with taking deep breaths and not feeling better. I'll start her back on antibiotic with doxycycline 100 mg by mouth twice a day for 10 days. 2. Chronic obstructive pulmonary disease, unspecified COPD type ([address]  She is on Spiriva respimat 2 puff in a.m., nebulizer with albuterol 4 times a day. She is on O2 of 2 L via nasal cannula 24-hour seven-day. Last chest x-ray on 326 showing faint nodule seen in the left lower base , changes of COPD. Chest x-ray, CT chest and PET scan reviewed with family. 3. Hypoxia  He is on 2 L O2 24-hour seven-day currently on 2 L O2 saturation is 95%. Continue O2 to keep saturation 90% or above. .      Return in about 3 months (around 7/15/2019) for COPD, hypoxia on O2.       Alexandria Plata MD

## 2019-04-19 ENCOUNTER — APPOINTMENT (OUTPATIENT)
Dept: CT IMAGING | Age: 74
DRG: 205 | End: 2019-04-19
Payer: MEDICARE

## 2019-04-19 ENCOUNTER — HOSPITAL ENCOUNTER (INPATIENT)
Age: 74
LOS: 13 days | Discharge: HOME | DRG: 205 | End: 2019-05-02
Attending: STUDENT IN AN ORGANIZED HEALTH CARE EDUCATION/TRAINING PROGRAM | Admitting: INTERNAL MEDICINE
Payer: MEDICARE

## 2019-04-19 DIAGNOSIS — R07.89 OTHER CHEST PAIN: ICD-10-CM

## 2019-04-19 DIAGNOSIS — I26.90 SEPTIC PULMONARY EMBOLISM WITHOUT ACUTE COR PULMONALE, UNSPECIFIED CHRONICITY (HCC): ICD-10-CM

## 2019-04-19 DIAGNOSIS — J44.1 COPD EXACERBATION (HCC): Primary | ICD-10-CM

## 2019-04-19 DIAGNOSIS — C34.01 MALIGNANT NEOPLASM OF HILUS OF RIGHT LUNG (HCC): ICD-10-CM

## 2019-04-19 PROBLEM — D64.9 ANEMIA: Status: ACTIVE | Noted: 2019-04-19

## 2019-04-19 PROBLEM — I26.99 PULMONARY EMBOLISM ON RIGHT (HCC): Status: ACTIVE | Noted: 2019-04-19

## 2019-04-19 PROBLEM — J44.9 COPD (CHRONIC OBSTRUCTIVE PULMONARY DISEASE) (HCC): Chronic | Status: ACTIVE | Noted: 2019-01-02

## 2019-04-19 LAB
ALBUMIN SERPL-MCNC: 3.6 G/DL (ref 3.5–4.6)
ALP BLD-CCNC: 73 U/L (ref 40–130)
ALT SERPL-CCNC: 31 U/L (ref 0–33)
ANION GAP SERPL CALCULATED.3IONS-SCNC: 17 MEQ/L (ref 9–15)
APTT: 29.6 SEC (ref 21.6–35.4)
AST SERPL-CCNC: 34 U/L (ref 0–35)
BASOPHILS ABSOLUTE: 0 K/UL (ref 0–0.2)
BASOPHILS RELATIVE PERCENT: 0.3 %
BILIRUB SERPL-MCNC: 0.3 MG/DL (ref 0.2–0.7)
BILIRUBIN URINE: NEGATIVE
BLOOD, URINE: NEGATIVE
BUN BLDV-MCNC: 14 MG/DL (ref 8–23)
CALCIUM SERPL-MCNC: 9.2 MG/DL (ref 8.5–9.9)
CHLORIDE BLD-SCNC: 98 MEQ/L (ref 95–107)
CK MB: 4.6 NG/ML (ref 0–3.8)
CLARITY: CLEAR
CO2: 20 MEQ/L (ref 20–31)
COLOR: YELLOW
CREAT SERPL-MCNC: 0.48 MG/DL (ref 0.5–0.9)
CREATINE KINASE-MB INDEX: 2.1 % (ref 0–3.5)
EKG ATRIAL RATE: 96 BPM
EKG P AXIS: 43 DEGREES
EKG P-R INTERVAL: 138 MS
EKG Q-T INTERVAL: 362 MS
EKG QRS DURATION: 88 MS
EKG QTC CALCULATION (BAZETT): 457 MS
EKG R AXIS: 49 DEGREES
EKG T AXIS: 41 DEGREES
EKG VENTRICULAR RATE: 96 BPM
EOSINOPHILS ABSOLUTE: 0 K/UL (ref 0–0.7)
EOSINOPHILS RELATIVE PERCENT: 0.7 %
GFR AFRICAN AMERICAN: >60
GFR NON-AFRICAN AMERICAN: >60
GLOBULIN: 3.7 G/DL (ref 2.3–3.5)
GLUCOSE BLD-MCNC: 97 MG/DL (ref 70–99)
GLUCOSE URINE: NEGATIVE MG/DL
HCT VFR BLD CALC: 29.7 % (ref 37–47)
HEMOGLOBIN: 10.6 G/DL (ref 12–16)
INR BLD: 1
KETONES, URINE: ABNORMAL MG/DL
LACTIC ACID: 1.2 MMOL/L (ref 0.5–2.2)
LEUKOCYTE ESTERASE, URINE: NEGATIVE
LYMPHOCYTES ABSOLUTE: 0.6 K/UL (ref 1–4.8)
LYMPHOCYTES RELATIVE PERCENT: 14.7 %
MAGNESIUM: 1.7 MG/DL (ref 1.7–2.4)
MCH RBC QN AUTO: 34.3 PG (ref 27–31.3)
MCHC RBC AUTO-ENTMCNC: 35.7 % (ref 33–37)
MCV RBC AUTO: 96.1 FL (ref 82–100)
MONOCYTES ABSOLUTE: 0.6 K/UL (ref 0.2–0.8)
MONOCYTES RELATIVE PERCENT: 13.9 %
NEUTROPHILS ABSOLUTE: 3.1 K/UL (ref 1.4–6.5)
NEUTROPHILS RELATIVE PERCENT: 70.4 %
NITRITE, URINE: NEGATIVE
PDW BLD-RTO: 15.9 % (ref 11.5–14.5)
PH UA: 7 (ref 5–9)
PLATELET # BLD: 225 K/UL (ref 130–400)
POTASSIUM SERPL-SCNC: 3.7 MEQ/L (ref 3.4–4.9)
PROTEIN UA: NEGATIVE MG/DL
PROTHROMBIN TIME: 10.4 SEC (ref 9–11.5)
RBC # BLD: 3.09 M/UL (ref 4.2–5.4)
SLIDE REVIEW: ABNORMAL
SODIUM BLD-SCNC: 135 MEQ/L (ref 135–144)
SPECIFIC GRAVITY UA: 1.01 (ref 1–1.03)
TOTAL CK: 222 U/L (ref 0–170)
TOTAL PROTEIN: 7.3 G/DL (ref 6.3–8)
TROPONIN: <0.01 NG/ML (ref 0–0.01)
URINE REFLEX TO CULTURE: ABNORMAL
UROBILINOGEN, URINE: 0.2 E.U./DL
WBC # BLD: 4.4 K/UL (ref 4.8–10.8)

## 2019-04-19 PROCEDURE — 6360000004 HC RX CONTRAST MEDICATION: Performed by: STUDENT IN AN ORGANIZED HEALTH CARE EDUCATION/TRAINING PROGRAM

## 2019-04-19 PROCEDURE — 85025 COMPLETE CBC W/AUTO DIFF WBC: CPT

## 2019-04-19 PROCEDURE — 71275 CT ANGIOGRAPHY CHEST: CPT

## 2019-04-19 PROCEDURE — 87040 BLOOD CULTURE FOR BACTERIA: CPT

## 2019-04-19 PROCEDURE — 96374 THER/PROPH/DIAG INJ IV PUSH: CPT

## 2019-04-19 PROCEDURE — 6360000002 HC RX W HCPCS: Performed by: STUDENT IN AN ORGANIZED HEALTH CARE EDUCATION/TRAINING PROGRAM

## 2019-04-19 PROCEDURE — 84484 ASSAY OF TROPONIN QUANT: CPT

## 2019-04-19 PROCEDURE — 99285 EMERGENCY DEPT VISIT HI MDM: CPT

## 2019-04-19 PROCEDURE — 83735 ASSAY OF MAGNESIUM: CPT

## 2019-04-19 PROCEDURE — 82728 ASSAY OF FERRITIN: CPT

## 2019-04-19 PROCEDURE — 83605 ASSAY OF LACTIC ACID: CPT

## 2019-04-19 PROCEDURE — 81003 URINALYSIS AUTO W/O SCOPE: CPT

## 2019-04-19 PROCEDURE — 83540 ASSAY OF IRON: CPT

## 2019-04-19 PROCEDURE — 93005 ELECTROCARDIOGRAM TRACING: CPT

## 2019-04-19 PROCEDURE — 82746 ASSAY OF FOLIC ACID SERUM: CPT

## 2019-04-19 PROCEDURE — 2060000000 HC ICU INTERMEDIATE R&B

## 2019-04-19 PROCEDURE — 36415 COLL VENOUS BLD VENIPUNCTURE: CPT

## 2019-04-19 PROCEDURE — 82553 CREATINE MB FRACTION: CPT

## 2019-04-19 PROCEDURE — 85730 THROMBOPLASTIN TIME PARTIAL: CPT

## 2019-04-19 PROCEDURE — 83550 IRON BINDING TEST: CPT

## 2019-04-19 PROCEDURE — 82607 VITAMIN B-12: CPT

## 2019-04-19 PROCEDURE — 85610 PROTHROMBIN TIME: CPT

## 2019-04-19 PROCEDURE — 80053 COMPREHEN METABOLIC PANEL: CPT

## 2019-04-19 PROCEDURE — 82550 ASSAY OF CK (CPK): CPT

## 2019-04-19 RX ORDER — FAMOTIDINE 20 MG/1
20 TABLET, FILM COATED ORAL 2 TIMES DAILY
Status: DISCONTINUED | OUTPATIENT
Start: 2019-04-19 | End: 2019-04-27

## 2019-04-19 RX ORDER — ONDANSETRON 2 MG/ML
4 INJECTION INTRAMUSCULAR; INTRAVENOUS EVERY 6 HOURS PRN
Status: DISCONTINUED | OUTPATIENT
Start: 2019-04-19 | End: 2019-05-02 | Stop reason: HOSPADM

## 2019-04-19 RX ORDER — MAGNESIUM HYDROXIDE 1200 MG/15ML
LIQUID ORAL
Status: DISCONTINUED
Start: 2019-04-19 | End: 2019-04-19 | Stop reason: WASHOUT

## 2019-04-19 RX ORDER — ALBUTEROL SULFATE 1.25 MG/3ML
1.25 SOLUTION RESPIRATORY (INHALATION)
Status: DISCONTINUED | OUTPATIENT
Start: 2019-04-19 | End: 2019-04-20

## 2019-04-19 RX ORDER — DEXAMETHASONE SODIUM PHOSPHATE 10 MG/ML
10 INJECTION INTRAMUSCULAR; INTRAVENOUS ONCE
Status: COMPLETED | OUTPATIENT
Start: 2019-04-19 | End: 2019-04-19

## 2019-04-19 RX ORDER — GUAIFENESIN 600 MG/1
600 TABLET, EXTENDED RELEASE ORAL 2 TIMES DAILY
Status: DISCONTINUED | OUTPATIENT
Start: 2019-04-19 | End: 2019-05-02 | Stop reason: HOSPADM

## 2019-04-19 RX ORDER — POLYETHYLENE GLYCOL 3350 17 G/17G
17 POWDER, FOR SOLUTION ORAL DAILY
Status: DISCONTINUED | OUTPATIENT
Start: 2019-04-20 | End: 2019-05-02 | Stop reason: HOSPADM

## 2019-04-19 RX ORDER — SODIUM CHLORIDE 0.9 % (FLUSH) 0.9 %
10 SYRINGE (ML) INJECTION PRN
Status: DISCONTINUED | OUTPATIENT
Start: 2019-04-19 | End: 2019-05-02 | Stop reason: HOSPADM

## 2019-04-19 RX ORDER — SODIUM CHLORIDE 0.9 % (FLUSH) 0.9 %
10 SYRINGE (ML) INJECTION EVERY 12 HOURS SCHEDULED
Status: DISCONTINUED | OUTPATIENT
Start: 2019-04-19 | End: 2019-05-02 | Stop reason: HOSPADM

## 2019-04-19 RX ORDER — ACETAMINOPHEN 325 MG/1
650 TABLET ORAL EVERY 4 HOURS PRN
Status: DISCONTINUED | OUTPATIENT
Start: 2019-04-19 | End: 2019-04-23

## 2019-04-19 RX ORDER — IPRATROPIUM BROMIDE AND ALBUTEROL SULFATE 2.5; .5 MG/3ML; MG/3ML
1 SOLUTION RESPIRATORY (INHALATION) 4 TIMES DAILY
Status: DISCONTINUED | OUTPATIENT
Start: 2019-04-19 | End: 2019-04-20

## 2019-04-19 RX ADMIN — DEXAMETHASONE SODIUM PHOSPHATE 10 MG: 10 INJECTION INTRAMUSCULAR; INTRAVENOUS at 19:15

## 2019-04-19 RX ADMIN — IOPAMIDOL 100 ML: 612 INJECTION, SOLUTION INTRAVENOUS at 18:41

## 2019-04-19 ASSESSMENT — ENCOUNTER SYMPTOMS
BACK PAIN: 0
SHORTNESS OF BREATH: 1
DIARRHEA: 0
CHEST TIGHTNESS: 0
VOMITING: 0
ABDOMINAL PAIN: 0
TROUBLE SWALLOWING: 0
COUGH: 0
SINUS PRESSURE: 0

## 2019-04-19 ASSESSMENT — PAIN DESCRIPTION - PAIN TYPE: TYPE: ACUTE PAIN

## 2019-04-19 ASSESSMENT — PAIN DESCRIPTION - LOCATION: LOCATION: CHEST

## 2019-04-19 ASSESSMENT — PAIN DESCRIPTION - FREQUENCY: FREQUENCY: CONTINUOUS

## 2019-04-19 ASSESSMENT — PAIN DESCRIPTION - DESCRIPTORS: DESCRIPTORS: ACHING

## 2019-04-19 ASSESSMENT — PAIN SCALES - GENERAL: PAINLEVEL_OUTOF10: 8

## 2019-04-19 NOTE — ED NOTES
Pt returned from ct states pain now 6/10 with taking deep breaths which she states is better than it was.  Pt resting on cart family at bedside      Lana Cosme RN  04/19/19 [id number]

## 2019-04-19 NOTE — ED PROVIDER NOTES
[address] [facility]  eMERGENCY dEPARTMENT eNCOUnter      Pt Name: Aimee Gauthier  MRN: [id number]  [place] 1945  Date of evaluation: 4/19/2019  Provider: [address]       Chief Complaint   Patient presents with    Shortness of Breath         HISTORY OF PRESENT ILLNESS   (Location/Symptom, Timing/Onset,Context/Setting, Quality, Duration, Modifying Factors, Severity)  Note limiting factors. Aimee Gauthier is a 75 y.o. female who presents to the emergency department with c/o SOB x 2 days. Patient says when she breaths in she gets a sharp stabbing pain to her chest (paint points across her mid chest). Patient has chills in the ER but the ER room is cold. Patient says she had her last Chemo Tx 4/9/2019. Patient denies N/V. Patient occasionally has a dry cough. Patient's male significant other is present. I have her permission to interview, examine her, discuss her care with them present. The history is provided by the patient and a significant other. NursingNotes were reviewed. REVIEW OF SYSTEMS    (2-9 systems for level 4, 10 or more for level 5)     Review of Systems   Constitutional: Positive for chills and fatigue. Negative for activity change, appetite change, fever and unexpected weight change. HENT: Negative for drooling, ear pain, nosebleeds, sinus pressure and trouble swallowing. Respiratory: Positive for shortness of breath. Negative for cough and chest tightness. Cardiovascular: Positive for chest pain ( with inspiration which started yesterday). Negative for leg swelling. Gastrointestinal: Negative for abdominal pain, diarrhea and vomiting. Endocrine: Negative for polydipsia and polyphagia. Genitourinary: Negative for dysuria, flank pain and frequency. Musculoskeletal: Negative for back pain and myalgias. Skin: Negative for pallor and rash. Neurological: Negative for syncope, weakness and headaches.    Hematological: Does not bruise/bleed easily. All other systems reviewed and are negative. Except as noted above the remainder of the review of systems was reviewed and negative. PAST MEDICAL HISTORY     Past Medical History:   Diagnosis Date    COPD (chronic obstructive pulmonary disease) ([facility]     Lung cancer ([facility]          SURGICALHISTORY       Past Surgical History:   Procedure Laterality Date    APPENDECTOMY      HYSTERECTOMY      TONSILLECTOMY           CURRENT MEDICATIONS       Current Discharge Medication List      CONTINUE these medications which have NOT CHANGED    Details   RaNITidine HCl (ZANTAC PO) Take 1 tablet by mouth as needed       SENNA PO Take 2 tablets by mouth nightly       albuterol (PROVENTIL) (2.5 MG/3ML) 0.083% nebulizer solution Take 3 mLs by nebulization every 6 hours as needed for Wheezing  Qty: 120 each, Refills: 1      magnesium hydroxide (MILK OF MAGNESIA) 400 MG/5ML suspension Take 30 mLs by mouth daily as needed for Constipation      folic acid (FOLVITE) 1 MG tablet Take 1 tablet by mouth daily  Qty: 30 tablet, Refills: 5    Associated Diagnoses: Malignant neoplasm of middle lobe of right lung ([facility]  Secondary malignant neoplasm of left lung ([facility])      Multiple Vitamins-Minerals (CENTRUM SILVER 50+WOMEN) TABS Take 1 tablet by mouth daily      tiotropium (SPIRIVA RESPIMAT) 2.5 MCG/ACT AERS inhaler Inhale 2 puffs into the lungs daily  Qty: 1 Inhaler, Refills: 3    Associated Diagnoses: Chronic obstructive pulmonary disease, unspecified COPD type (H[facility])      amLODIPine (NORVASC) 5 MG tablet Take 1 tablet by mouth daily  Qty: 30 tablet, Refills: 3             ALLERGIES     Levaquin [levofloxacin in d5w]    FAMILY HISTORY       Family History   Problem Relation Age of Onset    Stroke Mother     Emphysema Father           SOCIAL HISTORY       Social History     Socioeconomic History    Marital status:      Spouse name: None    Number of children: None    Years of education: None    Highest a cardiologist.    EKG: Normal sinus rhythm at 96 bpm, normal axis, LVH voltage, QT interval 360 ms, no PVCs. RADIOLOGY:   Non-plain filmimages such as CT, Ultrasound and MRI are read by the radiologist. Plain radiographic images are visualized and preliminarily interpreted by the emergency physician with the below findings:        Interpretation per the Radiologist below, if available at the time ofthis note:    XR CHEST STANDARD (2 VW)   Final Result      XR CHEST STANDARD (2 VW)   Final Result   BILATERAL INFILTRATES DEVELOPING SINCE THE FIRST OF THE YEAR AND WORSE SINCE THE STUDY ONE MONTH AGO. PROGRESSIVE INTERSTITIAL SPREAD OF TUMOR VERSUS ACUTE PNEUMONIA ARE THE TOP CONSIDERATIONS. 1.5 CM NODULAR MASS CENTRAL RIGHT LUNG SUSPICIOUS FOR NEOPLASM. CTA CHEST W WO CONTRAST   Final Result   SINGLE SMALL DISTAL SUBSEGMENTAL PULMONARY ARTERIAL EMBOLUS IN THE RIGHT LOWER LOBE. NO BIBASILAR INFILTRATES SINCE FEBRUARY 2019. SCATTERED METASTATIC LUNG NODULES AGAIN NOTED. THE POSSIBILITY THAT THIS SMALL EMBOLUS IS SECONDARY TO TUMOR IS    QUESTIONED. CT CHEST HIGH RESOLUTION    (Results Pending)         ED BEDSIDE ULTRASOUND:   Performed by ED Physician - none    LABS:  Labs Reviewed   COMPREHENSIVE METABOLIC PANEL - Abnormal; Notable for the following components:       Result Value    Anion Gap 17 (*)     CREATININE 0.48 (*)     Globulin 3.7 (*)     All other components within normal limits   CK - Abnormal; Notable for the following components:     Total  (*)     All other components within normal limits   CBC WITH AUTO DIFFERENTIAL - Abnormal; Notable for the following components:    WBC 4.4 (*)     RBC 3.09 (*)     Hemoglobin 10.6 (*)     Hematocrit 29.7 (*)     MCH 34.3 (*)     RDW 15.9 (*)     Lymphocytes # 0.6 (*)     All other components within normal limits   URINE RT REFLEX TO CULTURE - Abnormal; Notable for the following components:    Ketones, Urine TRACE (*)     All other components within normal limits   CKMB & RELATIVE PERCENT - Abnormal; Notable for the following components:    CK-MB 4.6 (*)     All other components within normal limits   BASIC METABOLIC PANEL W/ REFLEX TO MG FOR LOW K - Abnormal; Notable for the following components:    CO2 18 (*)     Anion Gap 17 (*)     Glucose 134 (*)     CREATININE 0.47 (*)     All other components within normal limits   CBC WITH AUTO DIFFERENTIAL - Abnormal; Notable for the following components:    WBC 2.5 (*)     RBC 2.87 (*)     Hemoglobin 9.5 (*)     Hematocrit 27.2 (*)     MCH 33.1 (*)     RDW 15.9 (*)     Lymphocytes # 0.4 (*)     Bands Relative 2 (*)     All other components within normal limits   IRON AND TIBC - Abnormal; Notable for the following components:    Iron 28 (*)     All other components within normal limits   FERRITIN - Abnormal; Notable for the following components:    Ferritin 687.3 (*)     All other components within normal limits   CBC WITH AUTO DIFFERENTIAL - Abnormal; Notable for the following components:    WBC 27.8 (*)     RBC 2.87 (*)     Hemoglobin 9.5 (*)     Hematocrit 27.5 (*)     MCH 33.0 (*)     RDW 16.1 (*)     Neutrophils # 25.0 (*)     Bands Relative 1 (*)     All other components within normal limits   CBC WITH AUTO DIFFERENTIAL - Abnormal; Notable for the following components:    WBC 36.9 (*)     RBC 2.86 (*)     Hemoglobin 9.6 (*)     Hematocrit 28.1 (*)     MCH 33.6 (*)     RDW 16.7 (*)     Neutrophils # 34.2 (*)     Monocytes # 1.3 (*)     All other components within normal limits   BASIC METABOLIC PANEL W/ REFLEX TO MG FOR LOW K - Abnormal; Notable for the following components:    CREATININE 0.47 (*)     All other components within normal limits   CBC WITH AUTO DIFFERENTIAL - Abnormal; Notable for the following components:    WBC 40.3 (*)     RBC 2.77 (*)     Hemoglobin 9.0 (*)     Hematocrit 26.9 (*)     MCH 32.5 (*)     RDW 17.3 (*)     Platelets 786 (*)     Neutrophils # 37.5 (*)     Monocytes # TROPONIN   VITAMIN B12 & FOLATE   BASIC METABOLIC PANEL W/ REFLEX TO MG FOR LOW K   BASIC METABOLIC PANEL W/ REFLEX TO MG FOR LOW K   BASIC METABOLIC PANEL W/ REFLEX TO MG FOR LOW K   PATH CONSULT HEMATOLOGY   BASIC METABOLIC PANEL W/ REFLEX TO MG FOR LOW K   BASIC METABOLIC PANEL W/ REFLEX TO MG FOR LOW K   CBC WITH AUTO DIFFERENTIAL       All other labs were within normal range or not returned as of this dictation. EMERGENCY DEPARTMENT COURSE and DIFFERENTIAL DIAGNOSIS/MDM:   Vitals:    Vitals:    04/26/19 0725 04/26/19 0840 04/26/19 1412 04/26/19 1940   BP: 126/71  95/65    Pulse: 76  99    Resp: 18 18 18 16   Temp: 97.9 °F (36.6 °C)  98.1 °F (36.7 °C)    TempSrc: Oral  Oral    SpO2: 98% 92% 95% 93%   Weight:       Height:           Dr. Africa Elizondo assumed care of the patient at  7pm and he will follow up on testing and determine the patient's disposition. MDM  Dr. Africa Elizondo follow-up on the CT scan and admitted the patient to hospital.  [address] time was 33minutes, excluding separately reportableprocedures. There was a high probability of clinicallysignificant/life threatening deterioration in the patient's condition which required my urgent intervention. CONSULTS:  IP CONSULT TO ONCOLOGY  IP CONSULT TO DIETITIAN  IP CONSULT TO PULMONOLOGY  IP CONSULT TO PALLIATIVE CARE    PROCEDURES:  Unless otherwise noted below, none     Procedures    FINAL IMPRESSION      1. COPD exacerbation ([address]    2. Malignant neoplasm of hilus of right lung ([address]    3.  Septic pulmonary embolism without acute cor pulmonale, unspecified chronicity ([address]          DISPOSITION/PLAN   DISPOSITION Admitted 04/19/2019 09:45:53 PM      PATIENT REFERRED TO:  [address] [place] [address] [address] [address] [phone]            DISCHARGE MEDICATIONS:  Current Discharge Medication List             (Please note that portions of this note were completed with a voice recognition program. Efforts were made to edit the dictations but occasionally words are mis-transcribed.)    Harshad Sims DO (electronically signed)  Attending Emergency Physician          Harshad Sims DO  04/26/19 7153

## 2019-04-19 NOTE — ED TRIAGE NOTES
Pt came to er reports sob x 3 days h/o copd and lung ca which she is under treatment currently. Pt alert oreinted x 3 skin warm dry pink lungs deminished throughout. no pedal edema.  Pt c/o chest pain with taking deep breaths

## 2019-04-20 PROBLEM — E43 SEVERE MALNUTRITION (HCC): Status: ACTIVE | Noted: 2019-04-20

## 2019-04-20 LAB
ANION GAP SERPL CALCULATED.3IONS-SCNC: 17 MEQ/L (ref 9–15)
ANISOCYTOSIS: ABNORMAL
ATYPICAL LYMPHOCYTE RELATIVE PERCENT: 3 %
BANDED NEUTROPHILS RELATIVE PERCENT: 2 % (ref 5–11)
BASOPHILS ABSOLUTE: 0 K/UL (ref 0–0.2)
BASOPHILS RELATIVE PERCENT: 0.1 %
BUN BLDV-MCNC: 15 MG/DL (ref 8–23)
CALCIUM SERPL-MCNC: 8.9 MG/DL (ref 8.5–9.9)
CHLORIDE BLD-SCNC: 104 MEQ/L (ref 95–107)
CO2: 18 MEQ/L (ref 20–31)
CREAT SERPL-MCNC: 0.47 MG/DL (ref 0.5–0.9)
EOSINOPHILS ABSOLUTE: 0 K/UL (ref 0–0.7)
EOSINOPHILS RELATIVE PERCENT: 0 %
FERRITIN: 687.3 NG/ML (ref 13–150)
FOLATE: >20 NG/ML (ref 7.3–26.1)
GFR AFRICAN AMERICAN: >60
GFR NON-AFRICAN AMERICAN: >60
GLUCOSE BLD-MCNC: 134 MG/DL (ref 70–99)
HCT VFR BLD CALC: 27.2 % (ref 37–47)
HEMOGLOBIN: 9.5 G/DL (ref 12–16)
IRON SATURATION: 12 % (ref 11–46)
IRON: 28 UG/DL (ref 37–145)
LYMPHOCYTES ABSOLUTE: 0.4 K/UL (ref 1–4.8)
LYMPHOCYTES RELATIVE PERCENT: 14 %
MCH RBC QN AUTO: 33.1 PG (ref 27–31.3)
MCHC RBC AUTO-ENTMCNC: 35 % (ref 33–37)
MCV RBC AUTO: 94.8 FL (ref 82–100)
MICROCYTES: ABNORMAL
MONOCYTES ABSOLUTE: 0.2 K/UL (ref 0.2–0.8)
MONOCYTES RELATIVE PERCENT: 6.4 %
MYELOCYTE PERCENT: 1 %
NEUTROPHILS ABSOLUTE: 1.9 K/UL (ref 1.4–6.5)
NEUTROPHILS RELATIVE PERCENT: 74 %
PDW BLD-RTO: 15.9 % (ref 11.5–14.5)
PLATELET # BLD: 232 K/UL (ref 130–400)
PLATELET SLIDE REVIEW: ADEQUATE
POTASSIUM REFLEX MAGNESIUM: 4 MEQ/L (ref 3.4–4.9)
RBC # BLD: 2.87 M/UL (ref 4.2–5.4)
SODIUM BLD-SCNC: 139 MEQ/L (ref 135–144)
TOTAL IRON BINDING CAPACITY: 239 UG/DL (ref 178–450)
VITAMIN B-12: 604 PG/ML (ref 232–1245)
WBC # BLD: 2.5 K/UL (ref 4.8–10.8)

## 2019-04-20 PROCEDURE — 6370000000 HC RX 637 (ALT 250 FOR IP): Performed by: INTERNAL MEDICINE

## 2019-04-20 PROCEDURE — 2580000003 HC RX 258: Performed by: INTERNAL MEDICINE

## 2019-04-20 PROCEDURE — 6370000000 HC RX 637 (ALT 250 FOR IP): Performed by: HOSPITALIST

## 2019-04-20 PROCEDURE — 2060000000 HC ICU INTERMEDIATE R&B

## 2019-04-20 PROCEDURE — 2700000000 HC OXYGEN THERAPY PER DAY

## 2019-04-20 PROCEDURE — 94640 AIRWAY INHALATION TREATMENT: CPT

## 2019-04-20 PROCEDURE — 94664 DEMO&/EVAL PT USE INHALER: CPT

## 2019-04-20 PROCEDURE — 99222 1ST HOSP IP/OBS MODERATE 55: CPT | Performed by: INTERNAL MEDICINE

## 2019-04-20 PROCEDURE — 36415 COLL VENOUS BLD VENIPUNCTURE: CPT

## 2019-04-20 PROCEDURE — 80048 BASIC METABOLIC PNL TOTAL CA: CPT

## 2019-04-20 PROCEDURE — 6360000002 HC RX W HCPCS: Performed by: HOSPITALIST

## 2019-04-20 PROCEDURE — 6360000002 HC RX W HCPCS: Performed by: INTERNAL MEDICINE

## 2019-04-20 PROCEDURE — 94760 N-INVAS EAR/PLS OXIMETRY 1: CPT

## 2019-04-20 PROCEDURE — 85025 COMPLETE CBC W/AUTO DIFF WBC: CPT

## 2019-04-20 PROCEDURE — 2580000003 HC RX 258: Performed by: HOSPITALIST

## 2019-04-20 RX ORDER — DOXYCYCLINE HYCLATE 100 MG/1
100 CAPSULE ORAL 2 TIMES DAILY
Status: COMPLETED | OUTPATIENT
Start: 2019-04-20 | End: 2019-04-21

## 2019-04-20 RX ORDER — IPRATROPIUM BROMIDE AND ALBUTEROL SULFATE 2.5; .5 MG/3ML; MG/3ML
1 SOLUTION RESPIRATORY (INHALATION) 3 TIMES DAILY
Status: DISCONTINUED | OUTPATIENT
Start: 2019-04-20 | End: 2019-05-02 | Stop reason: HOSPADM

## 2019-04-20 RX ORDER — M-VIT,TX,IRON,MINS/CALC/FOLIC 27MG-0.4MG
1 TABLET ORAL DAILY
Status: DISCONTINUED | OUTPATIENT
Start: 2019-04-20 | End: 2019-05-02 | Stop reason: HOSPADM

## 2019-04-20 RX ORDER — FOLIC ACID 1 MG/1
1 TABLET ORAL DAILY
Status: DISCONTINUED | OUTPATIENT
Start: 2019-04-20 | End: 2019-05-02 | Stop reason: HOSPADM

## 2019-04-20 RX ORDER — ASPIRIN 81 MG/1
81 TABLET, CHEWABLE ORAL DAILY
Status: DISCONTINUED | OUTPATIENT
Start: 2019-04-20 | End: 2019-05-02 | Stop reason: HOSPADM

## 2019-04-20 RX ORDER — AMLODIPINE BESYLATE 5 MG/1
5 TABLET ORAL DAILY
Status: DISCONTINUED | OUTPATIENT
Start: 2019-04-20 | End: 2019-05-02 | Stop reason: HOSPADM

## 2019-04-20 RX ORDER — ALBUTEROL SULFATE 2.5 MG/3ML
2.5 SOLUTION RESPIRATORY (INHALATION)
Status: DISCONTINUED | OUTPATIENT
Start: 2019-04-20 | End: 2019-05-02 | Stop reason: HOSPADM

## 2019-04-20 RX ORDER — SENNA AND DOCUSATE SODIUM 50; 8.6 MG/1; MG/1
2 TABLET, FILM COATED ORAL DAILY
Status: DISCONTINUED | OUTPATIENT
Start: 2019-04-20 | End: 2019-05-02 | Stop reason: HOSPADM

## 2019-04-20 RX ORDER — METHYLPREDNISOLONE SODIUM SUCCINATE 40 MG/ML
40 INJECTION, POWDER, LYOPHILIZED, FOR SOLUTION INTRAMUSCULAR; INTRAVENOUS DAILY
Status: DISCONTINUED | OUTPATIENT
Start: 2019-04-20 | End: 2019-04-21

## 2019-04-20 RX ADMIN — IPRATROPIUM BROMIDE AND ALBUTEROL SULFATE 1 AMPULE: .5; 3 SOLUTION RESPIRATORY (INHALATION) at 08:34

## 2019-04-20 RX ADMIN — ENOXAPARIN SODIUM 40 MG: 40 INJECTION SUBCUTANEOUS at 09:27

## 2019-04-20 RX ADMIN — FAMOTIDINE 20 MG: 20 TABLET ORAL at 00:13

## 2019-04-20 RX ADMIN — Medication 10 ML: at 00:18

## 2019-04-20 RX ADMIN — AMLODIPINE BESYLATE 5 MG: 5 TABLET ORAL at 09:26

## 2019-04-20 RX ADMIN — GUAIFENESIN 600 MG: 600 TABLET, EXTENDED RELEASE ORAL at 21:21

## 2019-04-20 RX ADMIN — DOXYCYCLINE HYCLATE 100 MG: 100 CAPSULE ORAL at 09:27

## 2019-04-20 RX ADMIN — GUAIFENESIN 600 MG: 600 TABLET, EXTENDED RELEASE ORAL at 09:27

## 2019-04-20 RX ADMIN — FOLIC ACID 1 MG: 1 TABLET ORAL at 09:26

## 2019-04-20 RX ADMIN — ENOXAPARIN SODIUM 40 MG: 40 INJECTION SUBCUTANEOUS at 00:13

## 2019-04-20 RX ADMIN — Medication 10 ML: at 09:28

## 2019-04-20 RX ADMIN — FAMOTIDINE 20 MG: 20 TABLET ORAL at 09:27

## 2019-04-20 RX ADMIN — MULTIPLE VITAMINS W/ MINERALS TAB 1 TABLET: TAB at 09:27

## 2019-04-20 RX ADMIN — ASPIRIN 81 MG 81 MG: 81 TABLET ORAL at 11:07

## 2019-04-20 RX ADMIN — IPRATROPIUM BROMIDE AND ALBUTEROL SULFATE 1 AMPULE: .5; 3 SOLUTION RESPIRATORY (INHALATION) at 20:53

## 2019-04-20 RX ADMIN — POLYETHYLENE GLYCOL 3350 17 G: 17 POWDER, FOR SOLUTION ORAL at 00:14

## 2019-04-20 RX ADMIN — TBO-FILGRASTIM 300 MCG: 300 INJECTION, SOLUTION SUBCUTANEOUS at 17:53

## 2019-04-20 RX ADMIN — DOXYCYCLINE HYCLATE 100 MG: 100 CAPSULE ORAL at 21:20

## 2019-04-20 RX ADMIN — Medication 10 ML: at 21:21

## 2019-04-20 RX ADMIN — IPRATROPIUM BROMIDE AND ALBUTEROL SULFATE 1 AMPULE: .5; 3 SOLUTION RESPIRATORY (INHALATION) at 13:36

## 2019-04-20 RX ADMIN — CEFTRIAXONE SODIUM 1 G: 1 INJECTION, POWDER, FOR SOLUTION INTRAMUSCULAR; INTRAVENOUS at 12:13

## 2019-04-20 RX ADMIN — GUAIFENESIN 600 MG: 600 TABLET, EXTENDED RELEASE ORAL at 00:13

## 2019-04-20 RX ADMIN — DOXYCYCLINE HYCLATE 100 MG: 100 CAPSULE ORAL at 00:17

## 2019-04-20 RX ADMIN — IPRATROPIUM BROMIDE AND ALBUTEROL SULFATE 1 AMPULE: .5; 3 SOLUTION RESPIRATORY (INHALATION) at 00:31

## 2019-04-20 RX ADMIN — FAMOTIDINE 20 MG: 20 TABLET ORAL at 21:20

## 2019-04-20 RX ADMIN — SENNOSIDES AND DOCUSATE SODIUM 2 TABLET: 8.6; 5 TABLET ORAL at 09:27

## 2019-04-20 ASSESSMENT — PAIN DESCRIPTION - PROGRESSION
CLINICAL_PROGRESSION: NOT CHANGED

## 2019-04-20 ASSESSMENT — PAIN SCALES - GENERAL
PAINLEVEL_OUTOF10: 0
PAINLEVEL_OUTOF10: 0

## 2019-04-20 NOTE — H&P
[place] [address] MEDICINE    HISTORY AND PHYSICAL EXAM    PATIENT NAME:  Barb Ruth    MRN:  [id number]  SERVICE DATE:  4/19/2019   SERVICE TIME:  11:49 PM    Primary Care Physician: DERECK Davidson         SUBJECTIVE  CHIEF COMPLAINT:  Persistent SOB    HPI:  This is a 75 y.o. female who presents with significant PMH of COPD, bilateral lung cancer currently receiving chemotherapy; [facility] of [facility] of breath moist nonproductive cough ×2 weeks but has become more severe within the last 2-3 days. Patient saw her pulmonologist and was started on Vibramycin and 4/15 but has not really had improvement in her symptoms. Patient denies productive cough, fevers. Patient states she has chest pain when taking a deep breath in that she describes as sharp and stabbing. Patient does not have a rescue inhaler but does have nebulizers and was using them without any improvement. Patient is currently receiving chemo for bilateral lung cancer, her last chemo treatment was on 4/9/2019, patient states she only has 1 more treatment left. Patient denies surgical intervention or radiation. Patient states she's also had some fatigue. Patient denies fevers, productive cough palpitations, nausea, vomiting, diarrhea, dysuria, numbness or tingling, or near-syncope. Patient states she does have some occasional abdominal cramping but states it is because of constipation, last bowel movement was a couple days ago.      PAST MEDICAL HISTORY:    Past Medical History:   Diagnosis Date    [facility] [facility]     Lung cancer ([facility] [address]      PAST SURGICAL HISTORY:    Past Surgical History:   Procedure Laterality Date    APPENDECTOMY      HYSTERECTOMY      TONSILLECTOMY       FAMILY HISTORY:    Family History   Problem Relation Age of Onset    Stroke Mother     Emphysema Father      SOCIAL HISTORY:    Social History     Socioeconomic History (VIBRA-TABS) 100 MG tablet Take 1 tablet by mouth 2 times daily for 10 days 4/15/19 4/25/19 Yes Tayler Bales MD   albuterol (PROVENTIL) (2.5 MG/3ML) 0.083% nebulizer solution Take 3 mLs by nebulization every 6 hours as needed for Wheezing 3/26/19  Yes Bari Clarke, DO   acetaminophen (TYLENOL) 500 MG tablet Take 1,000 mg by mouth every 6 hours as needed for Pain   Yes Historical Provider, MD   magnesium hydroxide (MILK OF MAGNESIA) 400 MG/5ML suspension Take 30 mLs by mouth daily as needed for Constipation   Yes Historical Provider, MD   folic acid (FOLVITE) 1 MG tablet Take 1 tablet by mouth daily 2/15/19  Yes Brett Combs, DO   Multiple Vitamins-Minerals (CENTRUM SILVER 50+WOMEN) TABS Take 1 tablet by mouth daily   Yes Historical Provider, MD   tiotropium (SPIRIVA RESPIMAT) 2.5 MCG/ACT AERS inhaler Inhale 2 puffs into the lungs daily 1/10/19  Yes Franco Granger MD   amLODIPine (NORVASC) 5 MG tablet Take 1 tablet by mouth daily 1/3/19  Yes Sherry Armstrong MD       ALLERGIES: Levaquin [levofloxacin in d5w]    REVIEW OF SYSTEM:   ROS as noted in HPI, 12 point ROS reviewed and otherwise negative. OBJECTIVE  PHYSICAL EXAM: /71   Pulse 104   Temp 98.3 °F (36.8 °C) (Oral)   Resp 20   Ht 5' 2\" (1.575 m)   Wt 94 lb 12.8 oz (43 kg)   SpO2 94%   BMI 17.34 kg/m²     CONSTITUTIONAL:  awake, alert, cooperative and thin  ENT:  fair dentition, dry mucosa  LUNGS:  Slightly SOB with conversation, decreased air exchange and crackles right base, diminished breath sounds right apex, left apex and left base  CARDIOVASCULAR:  tachycardic with regular rhythm, normal S1 and S2 and no edema  ABDOMEN:  hypoactive bowel sounds, firm, non-distended and tenderness noted in the left lower quadrant  MUSCULOSKELETAL:  there is no redness, warmth, or swelling of the joints  full range of motion noted  NEUROLOGIC:  Awake, alert, oriented to name, place and time. Cranial nerves II-XII are grossly intact.     SKIN:  no rashes and no lesions    DATA:     Diagnostic tests reviewed for today's visit:    Most recent labs and imaging results reviewed.      LABS:    Recent Results (from the past 24 hour(s))   Comprehensive Metabolic Panel    Collection Time: 04/19/19  6:15 PM   Result Value Ref Range    Sodium 135 135 - 144 mEq/L    Potassium 3.7 3.4 - 4.9 mEq/L    Chloride 98 95 - 107 mEq/L    CO2 20 20 - 31 mEq/L    Anion Gap 17 (H) 9 - 15 mEq/L    Glucose 97 70 - 99 mg/dL    BUN 14 8 - 23 mg/dL    CREATININE 0.48 (L) 0.50 - 0.90 mg/dL    GFR Non-African American >60.0 >60    GFR  >60.0 >60    Calcium 9.2 8.5 - 9.9 mg/dL    Total Protein 7.3 6.3 - 8.0 g/dL    Alb 3.6 3.5 - 4.6 g/dL    Total Bilirubin 0.3 0.2 - 0.7 mg/dL    Alkaline Phosphatase 73 40 - 130 U/L    ALT 31 0 - 33 U/L    AST 34 0 - 35 U/L    Globulin 3.7 (H) 2.3 - 3.5 g/dL   CK    Collection Time: 04/19/19  6:15 PM   Result Value Ref Range    Total  (H) 0 - 170 U/L   CBC Auto Differential    Collection Time: 04/19/19  6:15 PM   Result Value Ref Range    WBC 4.4 (L) 4.8 - 10.8 K/uL    RBC 3.09 (L) 4.20 - 5.40 M/uL    Hemoglobin 10.6 (L) 12.0 - 16.0 g/dL    Hematocrit 29.7 (L) 37.0 - 47.0 %    MCV 96.1 82.0 - 100.0 fL    MCH 34.3 (H) 27.0 - 31.3 pg    MCHC 35.7 33.0 - 37.0 %    RDW 15.9 (H) 11.5 - 14.5 %    Platelets 035 645 - 450 K/uL    SLIDE REVIEW see below     Neutrophils % 70.4 %    Lymphocytes % 14.7 %    Monocytes % 13.9 %    Eosinophils % 0.7 %    Basophils % 0.3 %    Neutrophils # 3.1 1.4 - 6.5 K/uL    Lymphocytes # 0.6 (L) 1.0 - 4.8 K/uL    Monocytes # 0.6 0.2 - 0.8 K/uL    Eosinophils # 0.0 0.0 - 0.7 K/uL    Basophils # 0.0 0.0 - 0.2 K/uL   APTT    Collection Time: 04/19/19  6:15 PM   Result Value Ref Range    aPTT 29.6 21.6 - 35.4 sec   Lactic Acid, Plasma    Collection Time: 04/19/19  6:15 PM   Result Value Ref Range    Lactic Acid 1.2 0.5 - 2.2 mmol/L   Magnesium    Collection Time: 04/19/19  6:15 PM   Result Value Ref Range    Magnesium 1. 7 1.7 - 2.4 mg/dL   Protime-INR    Collection Time: 04/19/19  6:15 PM   Result Value Ref Range    Protime 10.4 9.0 - 11.5 sec    INR 1.0    Troponin    Collection Time: 04/19/19  6:15 PM   Result Value Ref Range    Troponin <0.010 0.000 - 0.010 ng/mL   Urinalysis Reflex to Culture    Collection Time: 04/19/19  6:15 PM   Result Value Ref Range    Color, UA Yellow Straw/Yellow    Clarity, UA Clear Clear    Glucose, Ur Negative Negative mg/dL    Bilirubin Urine Negative Negative    Ketones, Urine TRACE (A) Negative mg/dL    Specific Gravity, UA 1.014 1.005 - 1.030    Blood, Urine Negative Negative    pH, UA 7.0 5.0 - 9.0    Protein, UA Negative Negative mg/dL    Urobilinogen, Urine 0.2 <2.0 E.U./dL    Nitrite, Urine Negative Negative    Leukocyte Esterase, Urine Negative Negative    Urine Reflex to Culture Not Indicated    CKMB & RELATIVE PERCENT    Collection Time: 04/19/19  6:15 PM   Result Value Ref Range    CK-MB 4.6 (H) 0.0 - 3.8 ng/mL    CK-MB Index 2.1 0.0 - 3.5 %       IMAGING:  Cta Chest W Wo Contrast    Result Date: 4/19/2019  EXAMINATION: CTA CHEST W WO CONTRAST  DATE AND TIME:4/19/2019 6:15 PM CLINICAL HISTORY: Acute chest pain. Shortness of breath  CP; hx lung cancer last chemo was 4/09/2019. CP with inspiration. PE vs pneumonia. Lungs clear on exam.  COMPARISON: None available. TECHNIQUE: Helical axial CTA of the chest was performed following the intravenous administration of 100 mL Optiray 320 intravenous contrast.  2D images were reconstructed in the axial and coronal planes. 3-D MIP images were generated in the coronal plane. Images were reviewed on the PACS workstation. All images including the 3D MIPS were permanently archived. All CT scans at this facility use dose modulation, iterative reconstruction, and/or weight based dosing when appropriate to reduce radiation dose to as low as reasonably achievable. FINDINGS:  Embolus:  There is a small intraluminal filling defect involving a distal subsegmental pulmonary artery branch to the right lower lobe . Findings are consistent with a small embolus. No other sites of pulmonary embolus. The possibility of tumor  embolus as opposed to thrombotic embolus is questioned but differentiating these can be extremely difficult. There are bilateral irregular pulmonary nodules on a background of severe emphysema and nonspecific infiltrates at the lung bases. These infiltrates have developed since the PET scan examination of this patient on February 1, 2019. The possibility that some of these changes are related to lymphangitic spread of tumor is also questioned. Mediastinum: No other significant abnormality. No lymphadenopathy. No pericardial effusion. Visualized abdomen: Multiple hepatic cysts. Bones: No osseous abnormalities. SINGLE SMALL DISTAL SUBSEGMENTAL PULMONARY ARTERIAL EMBOLUS IN THE RIGHT LOWER LOBE. NO BIBASILAR INFILTRATES SINCE FEBRUARY 2019. SCATTERED METASTATIC LUNG NODULES AGAIN NOTED. THE POSSIBILITY THAT THIS SMALL EMBOLUS IS SECONDARY TO TUMOR IS  QUESTIONED.        VTE Prophylaxis: low molecular weight heparin -  start    [address]  Patient Active Hospital Problem List:   Pulmonary embolism on right [facility]) (4/19/2019)    Assessment: CTA noted pulmonary embolism as well as lung cancer, patient does have bilateral lung cancer currently receiving chemotherapy     Plan:   Admit as inpatient with telemetry  Consult oncology  Lovenox 1mg/kg twice a day   Monitor for signs of bleeding     Anemia (4/19/2019)    Assessment: Patient has had consistent decline in hemoglobin and hematocrit since February 2019, likely secondary to cancer and/or chemo     Plan:   Check iron, folate, B12  Daily CBC     COPD (chronic obstructive pulmonary disease) ([facility] [address] (1/2/2019)    Assessment: Patient is an persistent shortness of breath and relieved by nebulizers, associated with moist nonproductive cough, afebrile, no leukocytosis, CO2 within normal limits, I do not believe this is a COPD exacerbation, shortness of breath due to PE, patient started on vibramycin on 4/15 by her pulmonologist to be on it for 10 days     Plan:   Nebulizers scheduled and when necessary  Mucinex   Continue Vibramycin              Plan of care discussed with: patient and significant other    SIGNATURE: KRISSY Almaguer CNP  DATE: April 19, 2019  TIME: 11:49 PM     Electronically signed by KRISSY Almaguer CNP on 4/19/2019 at 11:59 PM       Dr. Dmitry Johansen MD - supervising physician

## 2019-04-20 NOTE — PLAN OF CARE
Nutrition Problem: Unintended weight loss  Intervention: Food and/or Nutrient Delivery: Continue current diet, Start ONS(Continue general diet wit snacks between meals. Start high calorie ONS TID (chocolate))  Nutritional Goals: po intake > 75% of meals and supplements.  weight gain > 94 lb

## 2019-04-20 NOTE — ED PROVIDER NOTES
Addendum    As patient was endorsed to me by Dr. Jenae Lomeli at 1900 hrs. pending the outcome of the CTA chest.    CTA chest shows a pulmonary embolism along with lung cancer and exacerbation of COPD. The patient is normally ambulatory off her oxygen to the restroom and back. When she went to the restroom here, her pulse ox dropped into the 70s she felt extremely short of breath and required oxygen. The patient states long as she could walk out of here she would but she is unable to do so. I presented the patient with her lab tests and CT report and told her she has a pulmonary embolism. I told her the best for her to stay in the hospital she agreed that I call the hospitalist and the patient was admitted. Diagnosis:    Lung cancer with pulmonary embolism.     Disposition:    Admitted      Simi Elena MD  04/19/19 2116

## 2019-04-20 NOTE — FLOWSHEET NOTE
Pts home doxycycline stored in patients in-room lock box.  Electronically signed by Elva Littlejohn RN on 4/20/2019 at 2:40 AM

## 2019-04-20 NOTE — PROGRESS NOTES
[facility] [place] Res[facility] Therapy Evaluation   Current Order:  Duoneb QID & Accuneb Q2 PRN      Home Regimen: TID per patient     Ordering Physician: Nelida  Re-evaluation Date:  4/23     Diagnosis: Pulmonary embolism      Patient Status: Stable / Unstable + Physician notified    The following MDI Criteria must be met in order to convert aerosol to MDI with spacer.  If unable to meet, MDI will be converted to aerosol:  []  Patient able to demonstrate the ability to use MDI effectively  []  Patient alert and cooperative  []  Patient able to take deep breath with 5-10 second hold  []  Medication(s) available in this delivery method   []  Peak flow greater than or equal to 200 ml/min            Current Order Substituted To  (same drug, same frequency)   Aerosol to MDI [] Albuterol Sulfate 0.083% unit dose by aerosol Albuterol Sulfate MDI 2 puffs by inhalation with spacer    [] Levalbuterol 1.25 mg unit dose by aerosol Levalbuterol MDI 2 puffs by inhalation with spacer    [] Levalbuterol 0.63 mg unit dose by aerosol Levalbuterol MDI 2 puffs by inhalation with spacer    [] Ipratropium Bromide 0.02% unit dose by aerosol Ipratropium Bromide MDI 2 puffs by inhalation with spacer    [] Duoneb (Ipratropium + Albuterol) unit dose by aerosol Ipratropium MDI + Albuterol MDI 2 puffs by inhalation w/spacer   MDI to Aerosol [] Albuterol Sulfate MDI Albuterol Sulfate 0.083% unit dose by aerosol    [] Levalbuterol MDI 2 puffs by inhalation Levalbuterol 1.25 mg unit dose by aerosol    [] Ipratropium Bromide MDI by inhalation Ipratropium Bromide 0.02% unit dose by aerosol    [] Combivent (Ipratropium + Albuterol) MDI by inhalation Duoneb (Ipratropium + Albuterol) unit dose by aerosol   Treatment Assessment [Frequency/Schedule]:  Change frequency to: _______Duoneb TID & Albuterol Q2 PRN___________________________________________per Protocol, P&T, MEC      Points 0 1 2 3 4   Pulmonary Status  Non-Smoker  []   Smoking history   < 20 pack years  []   Smoking history  ?  20 pack years  []   Pulmonary Disorder  (acute or chronic)  [x]   Severe or Chronic w/ Exacerbation  []     Surgical Status No [x]   Surgeries     General []   Surgery Lower []   Abdominal Thoracic or []   Upper Abdominal Thoracic with  PulmonaryDisorder  []     Chest X-ray Clear/Not  Ordered     []  Chronic Changes  Results Pending  []  Infiltrates, atelectasis, pleural effusion, or edema  [x]  Infiltrates in more than one lobe []  Infiltrate + Atelectasis, &/or pleural effusion  []    Respiratory Pattern Regular,  RR = 12-20 [x]  Increased,  RR = 21-25 []  HENDERSON, irregular,  or RR = 26-30 []  Decreased FEV1  or RR = 31-35 []  Severe SOB, use  of accessory muscles, or RR ? 35  []    Mental Status Alert, oriented,  Cooperative [x]  Confused but Follows commands []  Lethargic or unable to follow commands []  Obtunded  []  Comatose  []    Breath Sounds Clear to  auscultation  []  Decreased unilaterally or  in bases only []  Decreased  bilaterally  [x]  Crackles or intermittent wheezes []  Wheezes []    Cough Strong, Spontan., & nonproductive [x]  Strong,  spontaneous, &  productive []  Weak,  Nonproductive []  Weak, productive or  with wheezes []  No spontaneous  cough or may require suctioning []    Level of Activity Ambulatory [x]  Ambulatory w/ Assist  []  Non-ambulatory []  Paraplegic []  Quadriplegic []    Total    Score:____7___     Triage Score:____4____      Tri       Triage:     1. (>20) Freq: Q3    2. (16-20) Freq: Q4   3. (11-15) Freq: QID & Albuterol Q2 PRN    4. (6-10) Freq: TID & Albuterol Q2 PRN    5. (0-5) Freq Q4prn

## 2019-04-20 NOTE — ONCOLOGY
Hematology/Oncology Consult  Encounter Date: 2019 9:54 AM    Ms. Barb Ruth is a 75 y.o. female  : 1945  MRN: [id number]  Jacey Number: [de-identified]  Requesting Provider: Yuliana Santacruz DO    Reason for request: Lung cancer and PE      CONSULTANT: Eitan Merida    HPI: Patient with metastatic lung cancer on chemotherapy. Last dose 2019. Increased SOB. CT showed a tiny, subsegmental PE.     Patient Active Problem List   Diagnosis    COPD exacerbation (HCC)    COPD (chronic obstructive pulmonary disease) (HCC)    Multiple lung nodules on CT    Tobacco abuse    Adenocarcinoma of right lung ([address]    Malignant neoplasm of middle lobe of right lung ([address]    Secondary malignant neoplasm of left lung ([address]    B12 deficiency    Pulmonary embolism on right ([address]    Anemia     Past Medical History:   Diagnosis Date    COPD (chronic obstructive pulmonary disease) ([address]     Lung cancer ([address]      @[facility]@  Family History   Problem Relation Age of Onset    Stroke Mother     Emphysema Father      Social History     Socioeconomic History    Marital status:      Spouse name: Not on file    Number of children: Not on file    Years of education: Not on file    Highest education level: Not on file   Occupational History    Not on file   Social Needs    Financial resource strain: Not on file    Food insecurity:     Worry: Not on file     Inability: Not on file    Transportation needs:     Medical: Not on file     Non-medical: Not on file   Tobacco Use    Smoking status: Former Smoker     Packs/day: 1.00     Years: 60.00     Pack years: 60.00     Types: Cigarettes     Start date: 1/10/1964     Last attempt to quit: 2019     Years since quittin.2    Smokeless tobacco: Never Used    Tobacco comment: quit in 2019   Substance and Sexual Activity    Alcohol use: Not Currently     Alcohol/week: 9.0 oz     Types: 15 Cans of beer per week     Comment: per pt has not had any since Dec 28 2018    Drug use: No    Sexual activity: Not on file   Lifestyle    Physical activity:     Days per week: Not on file     Minutes per session: Not on file    Stress: Not on file   Relationships    Social connections:     Talks on phone: Not on file     Gets together: Not on file     Attends Nondenominational service: Not on file     Active member of club or organization: Not on file     Attends meetings of clubs or organizations: Not on file     Relationship status: Not on file    Intimate partner violence:     Fear of current or ex partner: Not on file     Emotionally abused: Not on file     Physically abused: Not on file     Forced sexual activity: Not on file   Other Topics Concern    Not on file   Social History Narrative    Not on file     Current Facility-Administered Medications   Medication Dose Route Frequency Provider Last Rate Last Dose    amLODIPine (NORVASC) tablet 5 mg  5 mg Oral Daily Pilar Royal, APRN - CNP   5 mg at 04/20/19 3649    doxycycline hyclate (VIBRAMYCIN) capsule 100 mg  100 mg Oral BID Pilar Royal, APRN - CNP   100 mg at 38/47/22 9766    folic acid (FOLVITE) tablet 1 mg  1 mg Oral Daily Pilar Royal, APRN - CNP   1 mg at 04/20/19 4279    therapeutic multivitamin-minerals 1 tablet  1 tablet Oral Daily Pilar Royal, APRN - CNP   1 tablet at 04/20/19 0927    sennosides-docusate sodium (SENOKOT-S) 8.6-50 MG tablet 2 tablet  2 tablet Oral Daily Pilar Royal, APRN - CNP   2 tablet at 04/20/19 0927    tiotropium (SPIRIVA) inhalation capsule 18 mcg  18 mcg Inhalation Daily Pilar , APRN - CNP   Stopped at 04/20/19 0835    ipratropium-albuterol (DUONEB) nebulizer solution 1 ampule  1 ampule Inhalation TID Ivar Gerard Sedar, DO   1 ampule at 04/20/19 0834    albuterol (PROVENTIL) nebulizer solution 2.5 mg  2.5 mg Nebulization Q2H PRN Ivar Gerard Sedar, DO        sodium chloride flush 0.9 % injection 10 mL  10 mL Intravenous 2 times per day Pilar Royal, APRN - CNP 10 mL at 04/20/19 0928    sodium chloride flush 0.9 % injection 10 mL  10 mL Intravenous PRN Lela Mary APRN - CNP        magnesium hydroxide (MILK OF MAGNESIA) 400 MG/5ML suspension 30 mL  30 mL Oral Daily PRN Lela Mary, APRN - CNP        ondansetron (ZOFRAN) injection 4 mg  4 mg Intravenous Q6H PRN Lela Mary, APRN - CNP        famotidine (PEPCID) tablet 20 mg  20 mg Oral BID Lela Mary, APRN - CNP   20 mg at 04/20/19 0927    enoxaparin (LOVENOX) injection 40 mg  1 mg/kg Subcutaneous BID Lela Mary, APRN - CNP   40 mg at 04/20/19 0716    acetaminophen (TYLENOL) tablet 650 mg  650 mg Oral Q4H PRN Lela Mary, APRN - CNP        guaiFENesin [facility]) extended release tablet 600 mg  600 mg Oral BID Lela Mary APRN - CNP   600 mg at 04/20/19 1734    polyethylene glycol (GLYCOLAX) packet 17 g  17 g Oral Daily Lela Mary APRN - CNP   17 g at 04/20/19 0014     [unfilled]  Allergies   Allergen Reactions    Levaquin [Levofloxacin In D5w] Itching and Rash        Review of Systems  SOB and HENDERSON about stable overnight. Appetite is fair. No nausea or emesis. No new pain. No fever, chills, or night sweats. No change in bowel or urine habits. Otherwise ROS are negative. PHYSICAL EXAMINATION:   VITAL SIGNS: /64   Pulse 90   Temp 97.7 °F (36.5 °C) (Oral)   Resp 17   Ht 5' 2\" (1.575 m)   Wt 94 lb (42.6 kg)   SpO2 98%   BMI 17.19 kg/m²     GENERAL: In no acute distress, well- nourished, well- developed,alert and oriented to person place and time. SKIN: Warm and dry, withoutjaundice, ecchymoses, or petechiae. HEENT: Normocephalic, sclera anicteric, oral mucosa moist without lesion or exudate in the visible oral cavity or oropharynx, tongue mid-line with good mobility and no deviation with extension. NODES: No palpable adenopathy in the neck Levels I-V, bilateral   Supraclavicular fossae, axillary chains, or inguinal regions. LUNGS: Some rhonchi both lungs.   CARDIAC: Regular rate and rhythm, without murmurs, rubs or gallops. ABDOMINAL: Normal bowel soundspresent, soft, non-tender, no mass or  organomegaly. MUSKL: No edema. EXTREMITIES:Full ROM. NEUROLOGIC: Gait normal. No grossly apparent focal deficits.     LAB RESULTS:  Recent Results (from the past 24 hour(s))   Comprehensive Metabolic Panel    Collection Time: 04/19/19  6:15 PM   Result Value Ref Range    Sodium 135 135 - 144 mEq/L    Potassium 3.7 3.4 - 4.9 mEq/L    Chloride 98 95 - 107 mEq/L    CO2 20 20 - 31 mEq/L    Anion Gap 17 (H) 9 - 15 mEq/L    Glucose 97 70 - 99 mg/dL    BUN 14 8 - 23 mg/dL    CREATININE 0.48 (L) 0.50 - 0.90 mg/dL    GFR Non-African American >60.0 >60    GFR  >60.0 >60    Calcium 9.2 8.5 - 9.9 mg/dL    Total Protein 7.3 6.3 - 8.0 g/dL    Alb 3.6 3.5 - 4.6 g/dL    Total Bilirubin 0.3 0.2 - 0.7 mg/dL    Alkaline Phosphatase 73 40 - 130 U/L    ALT 31 0 - 33 U/L    AST 34 0 - 35 U/L    Globulin 3.7 (H) 2.3 - 3.5 g/dL   CK    Collection Time: 04/19/19  6:15 PM   Result Value Ref Range    Total  (H) 0 - 170 U/L   CBC Auto Differential    Collection Time: 04/19/19  6:15 PM   Result Value Ref Range    WBC 4.4 (L) 4.8 - 10.8 K/uL    RBC 3.09 (L) 4.20 - 5.40 M/uL    Hemoglobin 10.6 (L) 12.0 - 16.0 g/dL    Hematocrit 29.7 (L) 37.0 - 47.0 %    MCV 96.1 82.0 - 100.0 fL    MCH 34.3 (H) 27.0 - 31.3 pg    MCHC 35.7 33.0 - 37.0 %    RDW 15.9 (H) 11.5 - 14.5 %    Platelets 876 002 - 814 K/uL    SLIDE REVIEW see below     Neutrophils % 70.4 %    Lymphocytes % 14.7 %    Monocytes % 13.9 %    Eosinophils % 0.7 %    Basophils % 0.3 %    Neutrophils # 3.1 1.4 - 6.5 K/uL    Lymphocytes # 0.6 (L) 1.0 - 4.8 K/uL    Monocytes # 0.6 0.2 - 0.8 K/uL    Eosinophils # 0.0 0.0 - 0.7 K/uL    Basophils # 0.0 0.0 - 0.2 K/uL   APTT    Collection Time: 04/19/19  6:15 PM   Result Value Ref Range    aPTT 29.6 21.6 - 35.4 sec   Lactic Acid, Plasma    Collection Time: 04/19/19  6:15 PM   Result Value Ref Range

## 2019-04-20 NOTE — PROGRESS NOTES
Physician Progress Note    2019   11:02 AM    Name:  Kalen Hernandez  MRN:    [id number]     IP Day: 1     Admit Date: 2019  5:13 PM  PCP: DERECK Mae    Code Status:  Full Code    Subjective:      no new events. Dyspnea is the same. No chest pain.      Physical Examination:      Vitals:  /64   Pulse 90   Temp 97.7 °F (36.5 °C) (Oral)   Resp 17   Ht 5' 2\" (1.575 m)   Wt 94 lb (42.6 kg)   SpO2 98%   BMI 17.19 kg/m²   Temp (24hrs), Av.2 °F (36.8 °C), Min:97.7 °F (36.5 °C), Max:98.4 °F (36.9 °C)      General appearance: alert, cooperative and no distress  Mental Status: oriented to person, place and time and normal affect  Lungs: diminished bilaterally, normal effort  Heart: regular rate and rhythm, no murmur  Abdomen: soft, nontender, nondistended, bowel sounds present, no masses  Extremities: no edema, redness, tenderness in the calves  Skin: no gross lesions, rashes    Data:     Labs:  Recent Labs     19  18119  0613   WBC 4.4* 2.5*   HGB 10.6* 9.5*    232     Recent Labs     19  1815 19  0613    139   K 3.7 4.0   CL 98 104   CO2 20 18*   BUN 14 15   CREATININE 0.48* 0.47*   GLUCOSE 97 134*     Recent Labs     19   AST 34   ALT 31   BILITOT 0.3   ALKPHOS 73       Current Facility-Administered Medications   Medication Dose Route Frequency Provider Last Rate Last Dose    amLODIPine (NORVASC) tablet 5 mg  5 mg Oral Daily Karrin Luis, APRN - CNP   5 mg at 19 7720    doxycycline hyclate (VIBRAMYCIN) capsule 100 mg  100 mg Oral BID Karrin Luis, APRN - CNP   100 mg at  8482    folic acid (FOLVITE) tablet 1 mg  1 mg Oral Daily Karrin Luis, APRN - CNP   1 mg at 19 1606    therapeutic multivitamin-minerals 1 tablet  1 tablet Oral Daily Karrin Luis, APRN - CNP   1 tablet at 19 2896    sennosides-docusate sodium (SENOKOT-S) 8.6-50 MG tablet 2 tablet  2 tablet Oral Daily KRISSY Barrientos - CNP   2 tablet at 04/20/19 0927    tiotropium [facility]) inhalation capsule 18 mcg  18 mcg Inhalation Daily KRISSY Lyn CNP   Stopped at 04/20/19 0835    ipratropium-albuterol (DUONEB) nebulizer solution 1 ampule  1 ampule Inhalation TID Farhad Finnegan Sedar, DO   1 ampule at 04/20/19 0834    albuterol (PROVENTIL) nebulizer solution 2.5 mg  2.5 mg Nebulization Q2H PRN Farhad Finnegan Sedar,         enoxaparin (LOVENOX) injection 30 mg  30 mg Subcutaneous Daily Brett Combs DO        Tbo-Filgrastim [facility]) injection 300 mcg  300 mcg Subcutaneous QPM Brett Combs DO        aspirin chewable tablet 81 mg  81 mg Oral Daily Brett Combs DO        cefTRIAXone (ROCEPHIN) 1 g IVPB in 50 mL D5W minibag  1 g Intravenous Q24H Nathalie Flowers DO        methylPREDNISolone sodium (SOLU-MEDROL) injection 40 mg  40 mg Intravenous Daily Nathalie Flowers DO        sodium chloride flush 0.9 % injection 10 mL  10 mL Intravenous 2 times per day KRISSY Lyn - CNP   10 mL at 04/20/19 0928    sodium chloride flush 0.9 % injection 10 mL  10 mL Intravenous PRN KRISSY Lyn CNP        magnesium hydroxide (MILK OF MAGNESIA) 400 MG/5ML suspension 30 mL  30 mL Oral Daily PRN KRISSY Lyn - CNP        ondansetron (ZOFRAN) injection 4 mg  4 mg Intravenous Q6H PRN Phil Oakes APRN - CNP        famotidine (PEPCID) tablet 20 mg  20 mg Oral BID KRISSY Lyn - CNP   20 mg at 04/20/19 7016    acetaminophen (TYLENOL) tablet 650 mg  650 mg Oral Q4H PRN KRISSY Lyn - MAHAD        guaiFENesin [facility]) extended release tablet 600 mg  600 mg Oral BID Phil Oakes APRN - CNP   600 mg at 04/20/19 9952    polyethylene glycol (GLYCOLAX) packet 17 g  17 g Oral Daily Thersia Champ, APRN - CNP   17 g at 04/20/19 0014     Assessment and Plan:          Acute Hospital issues:    # COPD exacerbation- suspected PNA   - start solumedrol and duoneb   - start Rocephin in addition to doxycyline   - pulm consult

## 2019-04-21 LAB
ANION GAP SERPL CALCULATED.3IONS-SCNC: 13 MEQ/L (ref 9–15)
ANISOCYTOSIS: ABNORMAL
ATYPICAL LYMPHOCYTE RELATIVE PERCENT: 2 %
BANDED NEUTROPHILS RELATIVE PERCENT: 1 % (ref 5–11)
BASOPHILS ABSOLUTE: 0 K/UL (ref 0–0.2)
BASOPHILS RELATIVE PERCENT: 0.1 %
BUN BLDV-MCNC: 15 MG/DL (ref 8–23)
CALCIUM SERPL-MCNC: 8.9 MG/DL (ref 8.5–9.9)
CHLORIDE BLD-SCNC: 107 MEQ/L (ref 95–107)
CO2: 20 MEQ/L (ref 20–31)
CREAT SERPL-MCNC: 0.61 MG/DL (ref 0.5–0.9)
EOSINOPHILS ABSOLUTE: 0 K/UL (ref 0–0.7)
EOSINOPHILS RELATIVE PERCENT: 0.2 %
GFR AFRICAN AMERICAN: >60
GFR AFRICAN AMERICAN: >60
GFR NON-AFRICAN AMERICAN: >60
GFR NON-AFRICAN AMERICAN: >60
GLUCOSE BLD-MCNC: 96 MG/DL (ref 70–99)
HCT VFR BLD CALC: 27.5 % (ref 37–47)
HEMOGLOBIN: 9.5 G/DL (ref 12–16)
LYMPHOCYTES ABSOLUTE: 2.2 K/UL (ref 1–4.8)
LYMPHOCYTES RELATIVE PERCENT: 6 %
MACROCYTES: ABNORMAL
MCH RBC QN AUTO: 33 PG (ref 27–31.3)
MCHC RBC AUTO-ENTMCNC: 34.4 % (ref 33–37)
MCV RBC AUTO: 96 FL (ref 82–100)
MONOCYTES ABSOLUTE: 0.8 K/UL (ref 0.2–0.8)
MONOCYTES RELATIVE PERCENT: 2.7 %
NEUTROPHILS ABSOLUTE: 25 K/UL (ref 1.4–6.5)
NEUTROPHILS RELATIVE PERCENT: 89 %
PDW BLD-RTO: 16.1 % (ref 11.5–14.5)
PERFORMED ON: ABNORMAL
PLATELET # BLD: 332 K/UL (ref 130–400)
PLATELET SLIDE REVIEW: NORMAL
POC CREATININE: 0.5 MG/DL (ref 0.6–1.2)
POC SAMPLE TYPE: ABNORMAL
POTASSIUM REFLEX MAGNESIUM: 3.8 MEQ/L (ref 3.4–4.9)
RBC # BLD: 2.87 M/UL (ref 4.2–5.4)
SODIUM BLD-SCNC: 140 MEQ/L (ref 135–144)
WBC # BLD: 27.8 K/UL (ref 4.8–10.8)

## 2019-04-21 PROCEDURE — 6370000000 HC RX 637 (ALT 250 FOR IP): Performed by: INTERNAL MEDICINE

## 2019-04-21 PROCEDURE — 6370000000 HC RX 637 (ALT 250 FOR IP): Performed by: HOSPITALIST

## 2019-04-21 PROCEDURE — 2700000000 HC OXYGEN THERAPY PER DAY

## 2019-04-21 PROCEDURE — 99223 1ST HOSP IP/OBS HIGH 75: CPT | Performed by: INTERNAL MEDICINE

## 2019-04-21 PROCEDURE — 80048 BASIC METABOLIC PNL TOTAL CA: CPT

## 2019-04-21 PROCEDURE — 6360000002 HC RX W HCPCS: Performed by: INTERNAL MEDICINE

## 2019-04-21 PROCEDURE — 94760 N-INVAS EAR/PLS OXIMETRY 1: CPT

## 2019-04-21 PROCEDURE — 2580000003 HC RX 258: Performed by: INTERNAL MEDICINE

## 2019-04-21 PROCEDURE — 94640 AIRWAY INHALATION TREATMENT: CPT

## 2019-04-21 PROCEDURE — 2580000003 HC RX 258: Performed by: HOSPITALIST

## 2019-04-21 PROCEDURE — 85025 COMPLETE CBC W/AUTO DIFF WBC: CPT

## 2019-04-21 PROCEDURE — 2060000000 HC ICU INTERMEDIATE R&B

## 2019-04-21 PROCEDURE — 36415 COLL VENOUS BLD VENIPUNCTURE: CPT

## 2019-04-21 RX ORDER — METHYLPREDNISOLONE SODIUM SUCCINATE 40 MG/ML
40 INJECTION, POWDER, LYOPHILIZED, FOR SOLUTION INTRAMUSCULAR; INTRAVENOUS DAILY
Status: DISCONTINUED | OUTPATIENT
Start: 2019-04-21 | End: 2019-04-27

## 2019-04-21 RX ORDER — PREDNISONE 10 MG/1
10 TABLET ORAL DAILY
Status: DISCONTINUED | OUTPATIENT
Start: 2019-04-21 | End: 2019-04-21

## 2019-04-21 RX ADMIN — IPRATROPIUM BROMIDE AND ALBUTEROL SULFATE 1 AMPULE: .5; 3 SOLUTION RESPIRATORY (INHALATION) at 20:14

## 2019-04-21 RX ADMIN — CEFTRIAXONE SODIUM 1 G: 1 INJECTION, POWDER, FOR SOLUTION INTRAMUSCULAR; INTRAVENOUS at 12:47

## 2019-04-21 RX ADMIN — TIOTROPIUM BROMIDE 18 MCG: 18 CAPSULE ORAL; RESPIRATORY (INHALATION) at 08:32

## 2019-04-21 RX ADMIN — FAMOTIDINE 20 MG: 20 TABLET ORAL at 19:37

## 2019-04-21 RX ADMIN — GUAIFENESIN 600 MG: 600 TABLET, EXTENDED RELEASE ORAL at 19:38

## 2019-04-21 RX ADMIN — GUAIFENESIN 600 MG: 600 TABLET, EXTENDED RELEASE ORAL at 08:20

## 2019-04-21 RX ADMIN — METHYLPREDNISOLONE SODIUM SUCCINATE 40 MG: 40 INJECTION, POWDER, FOR SOLUTION INTRAMUSCULAR; INTRAVENOUS at 12:48

## 2019-04-21 RX ADMIN — ASPIRIN 81 MG 81 MG: 81 TABLET ORAL at 08:20

## 2019-04-21 RX ADMIN — IPRATROPIUM BROMIDE AND ALBUTEROL SULFATE 1 AMPULE: .5; 3 SOLUTION RESPIRATORY (INHALATION) at 12:20

## 2019-04-21 RX ADMIN — SENNOSIDES AND DOCUSATE SODIUM 2 TABLET: 8.6; 5 TABLET ORAL at 08:20

## 2019-04-21 RX ADMIN — DOXYCYCLINE HYCLATE 100 MG: 100 CAPSULE ORAL at 19:37

## 2019-04-21 RX ADMIN — IPRATROPIUM BROMIDE AND ALBUTEROL SULFATE 1 AMPULE: .5; 3 SOLUTION RESPIRATORY (INHALATION) at 08:32

## 2019-04-21 RX ADMIN — FAMOTIDINE 20 MG: 20 TABLET ORAL at 08:20

## 2019-04-21 RX ADMIN — DOXYCYCLINE HYCLATE 100 MG: 100 CAPSULE ORAL at 08:20

## 2019-04-21 RX ADMIN — Medication 10 ML: at 19:38

## 2019-04-21 RX ADMIN — POLYETHYLENE GLYCOL 3350 17 G: 17 POWDER, FOR SOLUTION ORAL at 08:27

## 2019-04-21 RX ADMIN — MULTIPLE VITAMINS W/ MINERALS TAB 1 TABLET: TAB at 08:20

## 2019-04-21 RX ADMIN — ACETAMINOPHEN 650 MG: 325 TABLET ORAL at 19:38

## 2019-04-21 RX ADMIN — ENOXAPARIN SODIUM 30 MG: 30 INJECTION SUBCUTANEOUS at 08:20

## 2019-04-21 RX ADMIN — AMLODIPINE BESYLATE 5 MG: 5 TABLET ORAL at 08:19

## 2019-04-21 RX ADMIN — Medication 10 ML: at 08:32

## 2019-04-21 RX ADMIN — TBO-FILGRASTIM 300 MCG: 300 INJECTION, SOLUTION SUBCUTANEOUS at 17:28

## 2019-04-21 RX ADMIN — FOLIC ACID 1 MG: 1 TABLET ORAL at 09:00

## 2019-04-21 ASSESSMENT — PAIN SCALES - GENERAL: PAINLEVEL_OUTOF10: 5

## 2019-04-21 NOTE — CONSULTS
Inpatient consult to Pulmonology  Consult performed by: Evie Forbes MD  Consult ordered by: Yuliana Santacruz DO        Pulmonary Medicine  Consult Note      Reason for consultation: COPD exacerbation, possible pneumonia    Consulting physician: Dr. Corina Trinidad:    This is 43-year-old female with past medical history significant for COPD, adenocarcinoma of lung, hypoxia on home O2 currently undergoing chemotherapy with Dr. Killian Adkins with carboplatin and Alimta and Keytruda. Patient has been having increased shortness of breath which is gradually worsening she is also having pain with taking deep breath. She said she is still working in [employer]. Even with 2 L O2 she is feeling more short of breath. Patient was seen in office and started on doxycycline last week but patient had no improvement so patient came to ER. Patient had CT chest which shows single small subsegmental pulmonary emboli in right lower lobe scattered mediastinal and lung nodules possibility that small embolus is secondary to tumor . Patient is on Lovenox. She is being treated for pneumonia was Rocephin and doxycycline. She is receiving nebulizer with DuoNeb 3 times a day and albuterol every 2 hours when necessary and Solu-Medrol 40 mg daily. She is also on Mucinex 600 mg twice a day. She has no complaint of hemoptysis. no fever or chills. no nausea,  vomiting , diarrhea or abdominal pain. Past Medical History:        Diagnosis Date    COPD [facility]chronic obstructive pulmonary disease) [facility] [address] [facility]     Lung cancer ([facility] [address]        Past Surgical History:        Procedure Laterality Date    APPENDECTOMY      HYSTERECTOMY      TONSILLECTOMY         Social History:     reports that she quit smoking about 3 months ago. Her smoking use included cigarettes. She started smoking about 55 years ago. She has a 60.00 pack-year smoking history.  She has never used smokeless tobacco. She reports that she drank about 9.0 oz of alcohol per week. She reports that she does not use drugs. Family History:       Problem Relation Age of Onset    Stroke Mother     Emphysema Father        Allergies:  Levaquin [levofloxacin in d5w]        MEDICATIONS during current hospitalization:    Continuous Infusions:    Scheduled Meds:   methylPREDNISolone  40 mg Intravenous Daily    amLODIPine  5 mg Oral Daily    doxycycline hyclate  100 mg Oral BID    folic acid  1 mg Oral Daily    therapeutic multivitamin-minerals  1 tablet Oral Daily    sennosides-docusate sodium  2 tablet Oral Daily    tiotropium  18 mcg Inhalation Daily    ipratropium-albuterol  1 ampule Inhalation TID    enoxaparin  30 mg Subcutaneous Daily    tbo-filgrastim  300 mcg Subcutaneous QPM    aspirin  81 mg Oral Daily    cefTRIAXone (ROCEPHIN) IV  1 g Intravenous Q24H    sodium chloride flush  10 mL Intravenous 2 times per day    famotidine  20 mg Oral BID    guaiFENesin  600 mg Oral BID    polyethylene glycol  17 g Oral Daily       PRN Meds:albuterol, sodium chloride flush, magnesium hydroxide, ondansetron, acetaminophen    REVIEW OF SYSTEMS:  As in history of present illness  Other 14 point review of system is negative. PHYSICAL EXAM:    Vitals:  BP (!) 96/56   Pulse 100   Temp 98.1 °F (36.7 °C) (Oral)   Resp 18   Ht 5' 2\" (1.575 m)   Wt 102 lb 8.2 oz (46.5 kg)   SpO2 (!) 89%   BMI 18.75 kg/m²   General:  Alert, awake, Oriented x3  .comfortable in bed, No distress. Head: Atraumatic , Normocephalic   Eyes: PERRL. No sclera icterus. No conjunctival injection. No discharge   ENT: No nasal  discharge. Pharynx clear. Neck:  Trachea midline. No thyromegaly, no JVD, No cervical adenopathy. Chest : Bilaterally symmetrical ,Normal effort,  No accessory muscle use  Lung : Diminished BS bilateraly. No Rales. No wheezing. No rhonchi. Heart[de-identified] Normal  rate. Regular rhythm. No mumur ,  Rub or gallop  ABD: Non-tender. Non-distended. No masses. No organmegaly.  Normal bowel sounds. No hernia. Musculoskeleton: normal range of motion in all extremites, strength and tone   Extremities: No pitting in both lower leg , No Cyanosis ,No clubbing  Neuro: no cranial nerve abnormality, normal reflex and sensation, no focal weakness   Skin: Warm and dry. No erythema rash on exposed extremities. Data Review  Recent Labs     04/19/19 1815 04/20/19  0613 04/21/19  0621   WBC 4.4* 2.5* 27.8*   HGB 10.6* 9.5* 9.5*   HCT 29.7* 27.2* 27.5*    232 332      Recent Labs     04/19/19 1815 04/20/19  0613 04/21/19  0621    139 140   K 3.7 4.0 3.8   CL 98 104 107   CO2 20 18* 20   BUN 14 15 15   CREATININE 0.48* 0.47* 0.61   GLUCOSE 97 134* 96         Lab Results   Component Value Date    LACTA 1.2 04/19/2019    LACTA 1.4 01/01/2019       Radiology  Ct Head W Wo Contrast    Result Date: 4/3/2019  COMPARISON: No prior studies available for comparison. HISTORY: C34.2 Malignant neoplasm of middle lobe of right lung ([address] ICD10 TECHNIQUE: CT HEAD W WO CONTRAST FINDINGS: Ventricles and sulci are unremarkable in size for a patient this age. No areas of abnormal density, hemorrhage, enhancement or mass effect are seen in the brain. A large lytic area is seen in the posterior parietal region on the right that measures 1 cm. It appears well circumscribed and appears to be a lipoma. A 1.6 mm lytic area is seen on the inner table of the skull and the occipital region on the right (series 3, image 13). There are other tiny 3 to 4 mm lytic area is seen within the skull that are  thought to be vascular channels. In the right frontal region a 6 mm oval-shaped focus is seen series 3, image 19). No abnormal enhancement of the brain parenchyma. In the skull there are multiple lucent area seen but most are thought to be vascular channels for emissary veins or arachnoid granulations. A larger area seen in the skull posterior appears to be fat. If patient is symptomatic consider MRI.  All CT scans at this facility use dose modulation, iterative reconstruction, and/or weight based dosing when appropriate to reduce radiation dose to as low as reasonably achievable. Cta Chest W Wo Contrast    Result Date: 4/19/2019  EXAMINATION: CTA CHEST W WO CONTRAST  DATE AND TIME:4/19/2019 6:15 PM CLINICAL HISTORY: Acute chest pain. Shortness of breath  CP; hx lung cancer last chemo was 4/09/2019. CP with inspiration. PE vs pneumonia. Lungs clear on exam.  COMPARISON: None available. TECHNIQUE: Helical axial CTA of the chest was performed following the intravenous administration of 100 mL Optiray 320 intravenous contrast.  2D images were reconstructed in the axial and coronal planes. 3-D MIP images were generated in the coronal plane. Images were reviewed on the PACS workstation. All images including the 3D MIPS were permanently archived. All CT scans at this facility use dose modulation, iterative reconstruction, and/or weight based dosing when appropriate to reduce radiation dose to as low as reasonably achievable. FINDINGS:  Embolus: There is a small intraluminal filling defect involving a distal subsegmental pulmonary artery branch to the right lower lobe . Findings are consistent with a small embolus. No other sites of pulmonary embolus. The possibility of tumor  embolus as opposed to thrombotic embolus is questioned but differentiating these can be extremely difficult. There are bilateral irregular pulmonary nodules on a background of severe emphysema and nonspecific infiltrates at the lung bases. These infiltrates have developed since the PET scan examination of this patient on February 1, 2019. The possibility that some of these changes are related to lymphangitic spread of tumor is also questioned. Mediastinum: No other significant abnormality. No lymphadenopathy. No pericardial effusion. Visualized abdomen: Multiple hepatic cysts. Bones: No osseous abnormalities. SINGLE SMALL DISTAL SUBSEGMENTAL PULMONARY ARTERIAL EMBOLUS IN THE RIGHT LOWER LOBE. NO BIBASILAR INFILTRATES SINCE FEBRUARY 2019. SCATTERED METASTATIC LUNG NODULES AGAIN NOTED. THE POSSIBILITY THAT THIS SMALL EMBOLUS IS SECONDARY TO TUMOR IS  QUESTIONED. Xr Chest Portable    Result Date: 3/26/2019  EXAMINATION: CHEST AP ERECT PORTABLE  CLINICAL HISTORY: Short of breath COMPARISONS: January 9, 2019  FINDINGS: Single view of the chest are submitted. The cardiac silhouette is of normal size configuration. The mediastinum is unremarkable. Pulmonary vascular is attenuated, lungs are hyperinflated and there is some widening of the AP diameter the chest and coarsening of the interstitium. Right sided trachea. Bibasilar areas atelectasis. Faint nodule within the left lower lobe measuring approximately 1.5 cm. No focal infiltrates. No effusions. Pneumothoraces. FAINT NODULE SEEN WITHIN LEFT LOWER LOBE. THE ADDITIONAL NODULES SEEN ON THE PET SCAN ARE NOT WELL APPRECIATED BY PLAIN FILM. Bob Gu PLEASE SEE PET-CT REPORT FOR ADDITIONAL DETAILS. RADIOGRAPHIC FINDINGS OF COPD. Assessment and  plan:     1. Adenocarcinoma of lung with metastases. On chemotherapy  2. Small pulmonary embolus in the right lower lobe subsegmental pulmonary artery. 3. COPD  4. Anemia  5. Leukocytosis possibly secondary to steroid  6. Chronic hypoxia on home O2    Patient is currently on Solu-Medrol 40 mg daily, doxycycline 100 mg twice a day , Rocephin 1 g every 24 hourly. She is on nebulizer with DuoNeb 3 times a day, albuterol every 2 hours when necessary. CT chest showing single small distal subsegmental pulmonary arterial embolus in the right lower lobe. Which could be tumor embolus. This is too small for causing any symptoms. Agree with Lovenox prophylactic dose.   Do not recommend full treatments with endocrine inhalation therapy. Patient also appeared to have a possible lymphangitic spread also Scattered metastatic lung nodule noted. Agree with present treatment plan.     Thank you for consult        Electronically signed by Wade Brunner MD, FCCP on 4/21/2019 at 3:34 PM

## 2019-04-21 NOTE — CARE COORDINATION
Personalized Discharge Plan of Care for:  Kayla Morales, 1945    Based on our assessment, your plan of care includes:     Patient has all needed scripts      Patient has all required DME--[employer]     Patient has a reliable scale and is able to read it     Patient has home oxygen set up if ordered--HAS [address]     Patient has nebulizer and associated equipment if [address]     Patient has been assessed for medication affordability     Consult with our Medication Assistance Program     Patient has been assessed for any transportation barriers--[employer].        Nutrition consult for CHF    Respiratory therapy consult that includes bedside instructions on administration of nebulizers and/or inhalers, and assessment of oxygen and equipment needs in the home     Consult with a Pulmonologist--ORDERED     Consult with a Cardiologist     Cardiac Rehab referral if EF <35%     Consult with Pharmacy for medication assessment prior to discharge      Consult with [facility] to aid in depression, anxiety, or coping issues     Consult with our Transitional Care Nurses for education and follow up--TO SEE     Consult with Lashawn Edwards, NOT HOMEBOUND     Consult with our [address]     Pulmonary Rehab Order for COPD, PN and CHF (if EF>35%)        Electronically by Alek Agudelo RN  on 4/21/2019 at 8:30 AM

## 2019-04-21 NOTE — CARE COORDINATION
PT FROM HOME WITH SPOUSE, [employer]. PT DOES NOT DRIVE, HER DTR TAKES HER TO APPTS/SHOPPING. PT STILL WORKS 5HRS/DAY AT A [employer] IN [place]. PT HAS HOME O2 2L AND NEBULIZERS THRU MEDICAL SERVICES. PT IS ON CHEMO FOR LUNG CA AND HER LAST TREATMENT WILL BE 4/30. PT DENIES ANY NEEDS AT THIS TIME. WILL FOLLOW.

## 2019-04-21 NOTE — PROGRESS NOTES
Physician Progress Note    2019   12:53 PM    Name:  Suzi Yu  MRN:    [id number]     IP Day: 2     Admit Date: 2019  5:13 PM  PCP: Nunzio Dandy, PA    Code Status:  Full Code    Subjective:     No new events. Dyspnea is the same. No chest pain.      Physical Examination:      Vitals:  BP (!) 96/56   Pulse 100   Temp 98.1 °F (36.7 °C) (Oral)   Resp 18   Ht 5' 2\" (1.575 m)   Wt 102 lb 8.2 oz (46.5 kg)   SpO2 (!) 89%   BMI 18.75 kg/m²   Temp (24hrs), Av °F (36.7 °C), Min:97.9 °F (36.6 °C), Max:98.1 °F (36.7 °C)      General appearance: alert, cooperative and no distress  Mental Status: oriented to person, place and time and normal affect  Lungs: diminished bilaterally, normal effort  Heart: regular rate and rhythm, no murmur  Abdomen: soft, nontender, nondistended, bowel sounds present, no masses  Extremities: no edema, redness, tenderness in the calves  Skin: no gross lesions, rashes    Data:     Labs:  Recent Labs     19  0613 19  0621   WBC 2.5* 27.8*   HGB 9.5* 9.5*    332     Recent Labs     19  0613 19  0621    140   K 4.0 3.8    107   CO2 18* 20   BUN 15 15   CREATININE 0.47* 0.61   GLUCOSE 134* 96     Recent Labs     19  1815   AST 34   ALT 31   BILITOT 0.3   ALKPHOS 73       Current Facility-Administered Medications   Medication Dose Route Frequency Provider Last Rate Last Dose    methylPREDNISolone sodium (SOLU-MEDROL) injection 40 mg  40 mg Intravenous Daily Yuliana Santacruz DO   40 mg at 19 1248    amLODIPine (NORVASC) tablet 5 mg  5 mg Oral Daily KRISSY Maldonado - CNP   5 mg at 19 4032    doxycycline hyclate (VIBRAMYCIN) capsule 100 mg  100 mg Oral BID KRISSY Maldonado - CNP   100 mg at  7460    folic acid (FOLVITE) tablet 1 mg  1 mg Oral Daily KRISSY Maldonado CNP   1 mg at 19 0900    therapeutic multivitamin-minerals 1 tablet  1 tablet Oral Daily KRISSY Maldonado CNP   1 tablet at 04/21/19 0820    sennosides-docusate sodium (SENOKOT-S) 8.6-50 MG tablet 2 tablet  2 tablet Oral Daily Pilar MARGOTH GarciaN - CNP   2 tablet at 04/21/19 0820    tiotropium (SPIRIVA) inhalation capsule 18 mcg  18 mcg Inhalation Daily Pilar , APRN - CNP   18 mcg at 04/21/19 5621    ipratropium-albuterol (DUONEB) nebulizer solution 1 ampule  1 ampule Inhalation TID Ivar Gerard Sedar, DO   1 ampule at 04/21/19 1220    albuterol (PROVENTIL) nebulizer solution 2.5 mg  2.5 mg Nebulization Q2H PRN Ivar Gerard Sedar, DO        enoxaparin (LOVENOX) injection 30 mg  30 mg Subcutaneous Daily Brett Combs, DO   30 mg at 04/21/19 0820    Tbo-Filgrastim (GRANIX) injection 300 mcg  300 mcg Subcutaneous QPM Chris Quiroz, DO   300 mcg at 04/20/19 1753    aspirin chewable tablet 81 mg  81 mg Oral Daily Chris Combs, DO   81 mg at 04/21/19 0820    cefTRIAXone (ROCEPHIN) 1 g IVPB in 50 mL D5W minibag  1 g Intravenous Q24H [facility],  mL/hr at 04/21/19 1247 1 g at 04/21/19 1247    sodium chloride flush 0.9 % injection 10 mL  10 mL Intravenous 2 times per day Jackelin Garcia APRN - CNP   10 mL at 04/21/19 0832    sodium chloride flush 0.9 % injection 10 mL  10 mL Intravenous PRN Johniear , APRN - CNP        magnesium hydroxide (MILK OF MAGNESIA) 400 MG/5ML suspension 30 mL  30 mL Oral Daily PRN Jackelin Garcia APRN - CNP        ondansetron (ZOFRAN) injection 4 mg  4 mg Intravenous Q6H PRN Jackelin Garcia, APRN - CNP        famotidine (PEPCID) tablet 20 mg  20 mg Oral BID Johniear , APRN - CNP   20 mg at 04/21/19 0820    acetaminophen (TYLENOL) tablet 650 mg  650 mg Oral Q4H PRN Jackelin Garcia, APRN - CNP        guaiFENesin [facility]) extended release tablet 600 mg  600 mg Oral BID KRISSY Mckeon CNP   600 mg at 04/21/19 0820    polyethylene glycol (GLYCOLAX) packet 17 g  17 g Oral Daily KRISSY Mckeon CNP   17 g at 04/21/19 0827     Assessment and Plan:          Acute Hospital issues:    # COPD exacerbation- suspected PNA   - resume solumedrol and duoneb   - resume Rocephin and doxycyline   - pulm consult     # small acute PE- doubt causing all her symptoms   - on lovenox as per hematology    # lung cancer   - as per oncology     # moderate malnutrition  - encourage PO intake     # leukopenia- now leukocytosis due to Granix and steroids   - monitor     DVT/PPx- ordered if appropriate        DISCHARGE PLANNING  Pending symptoms improvement        Electronically signed by Lidia Tracey DO on 4/21/2019 at 12:53 PM

## 2019-04-22 LAB
ANION GAP SERPL CALCULATED.3IONS-SCNC: 15 MEQ/L (ref 9–15)
BASOPHILS ABSOLUTE: 0 K/UL (ref 0–0.2)
BASOPHILS RELATIVE PERCENT: 0.1 %
BUN BLDV-MCNC: 15 MG/DL (ref 8–23)
CALCIUM SERPL-MCNC: 8.9 MG/DL (ref 8.5–9.9)
CHLORIDE BLD-SCNC: 102 MEQ/L (ref 95–107)
CO2: 23 MEQ/L (ref 20–31)
CREAT SERPL-MCNC: 0.56 MG/DL (ref 0.5–0.9)
EOSINOPHILS ABSOLUTE: 0.2 K/UL (ref 0–0.7)
EOSINOPHILS RELATIVE PERCENT: 0.5 %
GFR AFRICAN AMERICAN: >60
GFR NON-AFRICAN AMERICAN: >60
GLUCOSE BLD-MCNC: 97 MG/DL (ref 70–99)
HCT VFR BLD CALC: 28.1 % (ref 37–47)
HEMOGLOBIN: 9.6 G/DL (ref 12–16)
LYMPHOCYTES ABSOLUTE: 1.2 K/UL (ref 1–4.8)
LYMPHOCYTES RELATIVE PERCENT: 3.4 %
MCH RBC QN AUTO: 33.6 PG (ref 27–31.3)
MCHC RBC AUTO-ENTMCNC: 34.1 % (ref 33–37)
MCV RBC AUTO: 98.5 FL (ref 82–100)
MONOCYTES ABSOLUTE: 1.3 K/UL (ref 0.2–0.8)
MONOCYTES RELATIVE PERCENT: 3.4 %
NEUTROPHILS ABSOLUTE: 34.2 K/UL (ref 1.4–6.5)
NEUTROPHILS RELATIVE PERCENT: 92.6 %
PDW BLD-RTO: 16.7 % (ref 11.5–14.5)
PLATELET # BLD: 357 K/UL (ref 130–400)
POTASSIUM REFLEX MAGNESIUM: 4.3 MEQ/L (ref 3.4–4.9)
RBC # BLD: 2.86 M/UL (ref 4.2–5.4)
SODIUM BLD-SCNC: 140 MEQ/L (ref 135–144)
WBC # BLD: 36.9 K/UL (ref 4.8–10.8)

## 2019-04-22 PROCEDURE — 94760 N-INVAS EAR/PLS OXIMETRY 1: CPT

## 2019-04-22 PROCEDURE — 6360000002 HC RX W HCPCS: Performed by: INTERNAL MEDICINE

## 2019-04-22 PROCEDURE — 6370000000 HC RX 637 (ALT 250 FOR IP): Performed by: INTERNAL MEDICINE

## 2019-04-22 PROCEDURE — 6370000000 HC RX 637 (ALT 250 FOR IP): Performed by: HOSPITALIST

## 2019-04-22 PROCEDURE — 94640 AIRWAY INHALATION TREATMENT: CPT

## 2019-04-22 PROCEDURE — 2060000000 HC ICU INTERMEDIATE R&B

## 2019-04-22 PROCEDURE — 2580000003 HC RX 258: Performed by: INTERNAL MEDICINE

## 2019-04-22 PROCEDURE — 2580000003 HC RX 258: Performed by: HOSPITALIST

## 2019-04-22 PROCEDURE — 80048 BASIC METABOLIC PNL TOTAL CA: CPT

## 2019-04-22 PROCEDURE — 85025 COMPLETE CBC W/AUTO DIFF WBC: CPT

## 2019-04-22 PROCEDURE — 2700000000 HC OXYGEN THERAPY PER DAY

## 2019-04-22 PROCEDURE — 99232 SBSQ HOSP IP/OBS MODERATE 35: CPT | Performed by: INTERNAL MEDICINE

## 2019-04-22 PROCEDURE — 36415 COLL VENOUS BLD VENIPUNCTURE: CPT

## 2019-04-22 RX ADMIN — GUAIFENESIN 600 MG: 600 TABLET, EXTENDED RELEASE ORAL at 08:06

## 2019-04-22 RX ADMIN — ENOXAPARIN SODIUM 30 MG: 30 INJECTION SUBCUTANEOUS at 08:06

## 2019-04-22 RX ADMIN — Medication 10 ML: at 08:06

## 2019-04-22 RX ADMIN — METHYLPREDNISOLONE SODIUM SUCCINATE 40 MG: 40 INJECTION, POWDER, FOR SOLUTION INTRAMUSCULAR; INTRAVENOUS at 08:06

## 2019-04-22 RX ADMIN — IPRATROPIUM BROMIDE AND ALBUTEROL SULFATE 1 AMPULE: .5; 3 SOLUTION RESPIRATORY (INHALATION) at 13:49

## 2019-04-22 RX ADMIN — ASPIRIN 81 MG 81 MG: 81 TABLET ORAL at 08:06

## 2019-04-22 RX ADMIN — FOLIC ACID 1 MG: 1 TABLET ORAL at 08:07

## 2019-04-22 RX ADMIN — IPRATROPIUM BROMIDE AND ALBUTEROL SULFATE 1 AMPULE: .5; 3 SOLUTION RESPIRATORY (INHALATION) at 08:09

## 2019-04-22 RX ADMIN — CEFTRIAXONE SODIUM 1 G: 1 INJECTION, POWDER, FOR SOLUTION INTRAMUSCULAR; INTRAVENOUS at 11:38

## 2019-04-22 RX ADMIN — AMLODIPINE BESYLATE 5 MG: 5 TABLET ORAL at 08:06

## 2019-04-22 RX ADMIN — FAMOTIDINE 20 MG: 20 TABLET ORAL at 08:06

## 2019-04-22 RX ADMIN — GUAIFENESIN 600 MG: 600 TABLET, EXTENDED RELEASE ORAL at 20:55

## 2019-04-22 RX ADMIN — TBO-FILGRASTIM 300 MCG: 300 INJECTION, SOLUTION SUBCUTANEOUS at 17:46

## 2019-04-22 RX ADMIN — FAMOTIDINE 20 MG: 20 TABLET ORAL at 20:55

## 2019-04-22 RX ADMIN — IPRATROPIUM BROMIDE AND ALBUTEROL SULFATE 1 AMPULE: .5; 3 SOLUTION RESPIRATORY (INHALATION) at 19:32

## 2019-04-22 RX ADMIN — ACETAMINOPHEN 650 MG: 325 TABLET ORAL at 08:41

## 2019-04-22 RX ADMIN — MULTIPLE VITAMINS W/ MINERALS TAB 1 TABLET: TAB at 08:06

## 2019-04-22 RX ADMIN — Medication 10 ML: at 20:55

## 2019-04-22 ASSESSMENT — PAIN SCALES - GENERAL
PAINLEVEL_OUTOF10: 5
PAINLEVEL_OUTOF10: 0

## 2019-04-22 ASSESSMENT — PAIN DESCRIPTION - LOCATION: LOCATION: CHEST

## 2019-04-22 ASSESSMENT — PAIN DESCRIPTION - ORIENTATION: ORIENTATION: OTHER (COMMENT)

## 2019-04-22 ASSESSMENT — PAIN DESCRIPTION - PAIN TYPE: TYPE: ACUTE PAIN

## 2019-04-22 NOTE — PROGRESS NOTES
Nutrition Assessment    Type and Reason for Visit: Reassess    Nutrition Recommendations: Continue current diet, Continue current ONS(General diet; standard high calorie ONS TID)    Nutrition Assessment: Pt improving from a nutrition standpoint as now consuming >75% of meals and ONS. Will continue ONS and monitoring. Malnutrition Assessment:  · Malnutrition Status: Meets the criteria for severe malnutrition  · Context: Chronic illness  · Findings of the 6 clinical characteristics of malnutrition (Minimum of 2 out of 6 clinical characteristics is required to make the diagnosis of moderate or severe Protein Calorie Malnutrition based on AND/ASPEN Guidelines):  1. Energy Intake-Less than or equal to 50% of estimated energy requirement, Greater than or equal to 1 month    2. Weight Loss-10% loss or greater, in 1 month  3. Fat Loss-Moderate subcutaneous fat loss, Fat overlying the ribs  4. Muscle Loss-Moderate muscle mass loss, Clavicles (pectoralis and deltoids)  5. Fluid Accumulation-No significant fluid accumulation,    6.  Strength-Not measured    Nutrition Risk Level: Moderate    Nutrient Needs:  · Estimated Daily Total Kcal: 4820-3616 (28-30 kcals/kg)  · Estimated Daily Protein (g): 52-61 (1.1-1.3 g/kg)  · Estimated Daily Total Fluid (ml/day): 1400 mL (1 mL/kcal)    Nutrition Diagnosis:   · Problem: Unintended weight loss  · Etiology: related to Insufficient energy/nutrient consumption, Catabolic illness, Impaired respiratory function-inability to consume food     Signs and symptoms:  as evidenced by Intake 0-25%, Diet history of poor intake, Weight loss greater than or equal to 5% in 1 month    Objective Information:  · Nutrition-Focused Physical Findings: BM 4/22 (Senokot-S). No edema per nsg.    · Wound Type: None  · Current Nutrition Therapies:  · Oral Diet Orders: General   · Oral Diet intake: 51-75%, %  · Oral Nutrition Supplement (ONS) Orders: None  · ONS intake: %  · Anthropometric

## 2019-04-22 NOTE — PROGRESS NOTES
Hospitalist Progress Note  4/22/2019 6:02 PM    Assessment and Plan:   1. Acute COPD exacerbation: Pulmonology consult, IV Steroids with taper to PO once exp wheezes resolve, IV antibiotics, Chest PT, Acapella, Incentive spirometry, Duonebs Q4hr, Guaneficine. 2. Severe protein calorie malnutrition: Ordered PO supplemental Nutrition. Dietitian evaluated patient. Daily weights, Calorie count. 3. Adenocarcinoma with mets to other lung: Oncology on board. Will consult Palliative care  4. Leukopenia treated with Granix: Oncology on board. Now Leukocytosis likely in part due to demarginalization due to steroids as well. 5. Diet: DIET GENERAL;  6. Dietary Nutrition Supplements: Standard High Calorie Oral Supplement  7. Advance Directive: Full Code . 8. DVT prophylaxis: Chemical prophylaxis SQ adjusting dose for lovenox currently dose of 30mg not correct dose for normal renal function of patient. 40mg QD is ppx dosing which is of importance considering hypercoagulable state  9. Discharge planning: SW on board. Will discharge once medically stable and cleared by all consultants. 10. High Risk Readmission Screening Tool Score Noted. Additionally, the following hospital problems were addressed:  Principal Problem:    Pulmonary embolism on right [facility])  Active Problems:    COPD (chronic obstructive pulmonary disease) (HCC)    Anemia    Severe malnutrition (HCC)  Resolved Problems:    * No resolved hospital problems. *      ** Total time spent reviewing medical records, evaluating patient, speaking with RN's and consultants where I was focused exclusively on this patient: 35 minutes. This time is excluding time spent performing procedures or significant events occurring earlier or later in the day requiring my attention and focus. Subjective:   Admit Date: 4/19/2019  PCP: DERECK Brown    No acute events overnight. Afebrile Telemetry reviewed. No new complaints.  Pt denies chest pain, N/V, fevers or chills. Objective:     Vitals:    04/22/19 0558 04/22/19 0732 04/22/19 0809 04/22/19 1419   BP:  107/61  108/61   Pulse:  97  109   Resp:  16  16   Temp:  98.4 °F (36.9 °C)  97.9 °F (36.6 °C)   TempSrc:  Oral  Oral   SpO2:  94% 93% 93%   Weight: 103 lb 9.9 oz (47 kg)      Height:         General appearance: Mild acute distress, lethargic Mild conversational dyspnea noted. Lungs:  Diminished (+) exp wheezes, Mild use of accessory muscles  Heart:  S1, S2 normal, RRR, no MRG appreciated  Abdomen: (+) BS, soft, non-tender; non distended no guarding or rigidity.    Extremities:  no cyanosis,No edema bilat lower exts, no calf tenderness bilaterally      Medications:      methylPREDNISolone  40 mg Intravenous Daily    amLODIPine  5 mg Oral Daily    folic acid  1 mg Oral Daily    therapeutic multivitamin-minerals  1 tablet Oral Daily    sennosides-docusate sodium  2 tablet Oral Daily    tiotropium  18 mcg Inhalation Daily    ipratropium-albuterol  1 ampule Inhalation TID    enoxaparin  30 mg Subcutaneous Daily    tbo-filgrastim  300 mcg Subcutaneous QPM    aspirin  81 mg Oral Daily    cefTRIAXone (ROCEPHIN) IV  1 g Intravenous Q24H    sodium chloride flush  10 mL Intravenous 2 times per day    famotidine  20 mg Oral BID    guaiFENesin  600 mg Oral BID    polyethylene glycol  17 g Oral Daily       LABS Reviewed    IMAGING Reviewed    Lester Villar MD  [facility]

## 2019-04-22 NOTE — PROGRESS NOTES
INPATIENT PROGRESS NOTES    PATIENT NAME: Alberto Hinson  MRN: [id number]  SERVICE DATE:  April 22, 2019   SERVICE TIME:  12:41 PM      PRIMARY SERVICE: Pulmonary Disease    CHIEF COMPLAINTS: Shortness of breath, dry cough    INTERVAL HPI: Patient seen and examined at bedside, Interval Notes, orders reviewed. Nursing notes noted  Patient reports little change since admission. Remains dyspneic on exertion with mostly dry cough now starting to expectorate some white sputum. She is afebrile with stable hemodynamic status      OBJECTIVE    Body mass index is 18.95 kg/m². PHYSICAL EXAM:  Vitals:  /61   Pulse 97   Temp 98.4 °F (36.9 °C) (Oral)   Resp 16   Ht 5' 2\" (1.575 m)   Wt 103 lb 9.9 oz (47 kg)   SpO2 94%   BMI 18.95 kg/m²   General: Patient is  Alert, awake . comfortable in bed, No distress. Head: Atraumatic , Normocephalic   Eyes: PERRL. No icteric sclera. No conjunctival injection. No discharge   ENT: No nasal  discharge. Pharynx clear. Neck:  Trachea midline. No thyromegaly, no JVD, No cervical adenopathy. Chest : Adequate spontaneous effort, symmetric bilateral excursions  Lung : Mildly diminished breath sounds bilaterally, coarse crackles in the bases, with \"Velcro-quality\"  Heart[de-identified] Regular rhythm and rate. No mumur ,  Rub or gallop  ABD: Benign. Non-tender. Non-distended. No masses or organmegaly. Normal bowel sounds. EXT: No significant Pitting edema both lower extremities , No Cyanosis No clubbing  Neuro: no focal weakness  Skin: Warm and dry. No erythema or rash on exposed extremities.       DATA:   Recent Labs     04/21/19  0621 04/22/19  0541   WBC 27.8* 36.9*   HGB 9.5* 9.6*   HCT 27.5* 28.1*   MCV 96.0 98.5    357     Recent Labs     04/19/19  1815  04/21/19  0621 04/22/19  0541      < > 140 140   K 3.7   < > 3.8 4.3   CL 98   < > 107 102   CO2 20   < > 20 23   BUN 14   < > 15 15   CREATININE 0.48*   < > 0.61 0.56   GLUCOSE 97   < > 96 97   CALCIUM 9.2   < > 8.9 8.9 PROT 7.3  --   --   --    LABALBU 3.6  --   --   --    BILITOT 0.3  --   --   --    ALKPHOS 73  --   --   --    AST 34  --   --   --    ALT 31  --   --   --    LABGLOM >60.0   < > >60.0 >60.0   GFRAA >60.0   < > >60.0 >60.0   GLOB 3.7*  --   --   --     < > = values in this interval not displayed. No results for input(s): PHART, UFK5JDM, PO2ART, VGQ7LKO, BEART, K1SUPVKC in the last 72 hours. O2 Device: Nasal cannula  O2 Flow Rate (L/min): 3 L/min    DIET GENERAL;  Dietary Nutrition Supplements: Standard High Calorie Oral Supplement     MEDICATIONS during current hospitalization:    Continuous Infusions:    Scheduled Meds:   methylPREDNISolone  40 mg Intravenous Daily    amLODIPine  5 mg Oral Daily    folic acid  1 mg Oral Daily    therapeutic multivitamin-minerals  1 tablet Oral Daily    sennosides-docusate sodium  2 tablet Oral Daily    tiotropium  18 mcg Inhalation Daily    ipratropium-albuterol  1 ampule Inhalation TID    enoxaparin  30 mg Subcutaneous Daily    tbo-filgrastim  300 mcg Subcutaneous QPM    aspirin  81 mg Oral Daily    cefTRIAXone (ROCEPHIN) IV  1 g Intravenous Q24H    sodium chloride flush  10 mL Intravenous 2 times per day    famotidine  20 mg Oral BID    guaiFENesin  600 mg Oral BID    polyethylene glycol  17 g Oral Daily       PRN Meds:albuterol, sodium chloride flush, magnesium hydroxide, ondansetron, acetaminophen    Radiology  Ct Head W Wo Contrast    Result Date: 4/3/2019  COMPARISON: No prior studies available for comparison. HISTORY: C34.2 Malignant neoplasm of middle lobe of right lung ([facility] [address] ICD10 TECHNIQUE: CT HEAD W WO CONTRAST FINDINGS: Ventricles and sulci are unremarkable in size for a patient this age. No areas of abnormal density, hemorrhage, enhancement or mass effect are seen in the brain. A large lytic area is seen in the posterior parietal region on the right that measures 1 cm. It appears well circumscribed and appears to be a lipoma.  A 1.6 mm lytic area is seen on the inner table of the skull and the occipital region on the right (series 3, image 13). There are other tiny 3 to 4 mm lytic area is seen within the skull that are  thought to be vascular channels. In the right frontal region a 6 mm oval-shaped focus is seen series 3, image 19). No abnormal enhancement of the brain parenchyma. In the skull there are multiple lucent area seen but most are thought to be vascular channels for emissary veins or arachnoid granulations. A larger area seen in the skull posterior appears to be fat. If patient is symptomatic consider MRI. All CT scans at this facility use dose modulation, iterative reconstruction, and/or weight based dosing when appropriate to reduce radiation dose to as low as reasonably achievable. Cta Chest W Wo Contrast    Result Date: 4/19/2019  EXAMINATION: CTA CHEST W WO CONTRAST  DATE AND TIME:4/19/2019 6:15 PM CLINICAL HISTORY: Acute chest pain. Shortness of breath  CP; hx lung cancer last chemo was 4/09/2019. CP with inspiration. PE vs pneumonia. Lungs clear on exam.  COMPARISON: None available. TECHNIQUE: Helical axial CTA of the chest was performed following the intravenous administration of 100 mL Optiray 320 intravenous contrast.  2D images were reconstructed in the axial and coronal planes. 3-D MIP images were generated in the coronal plane. Images were reviewed on the PACS workstation. All images including the 3D MIPS were permanently archived. All CT scans at this facility use dose modulation, iterative reconstruction, and/or weight based dosing when appropriate to reduce radiation dose to as low as reasonably achievable. FINDINGS:  Embolus: There is a small intraluminal filling defect involving a distal subsegmental pulmonary artery branch to the right lower lobe . Findings are consistent with a small embolus. No other sites of pulmonary embolus.  The possibility of tumor  embolus as opposed to thrombotic embolus is questioned but differentiating these can be extremely difficult. There are bilateral irregular pulmonary nodules on a background of severe emphysema and nonspecific infiltrates at the lung bases. These infiltrates have developed since the PET scan examination of this patient on February 1, 2019. The possibility that some of these changes are related to lymphangitic spread of tumor is also questioned. Mediastinum: No other significant abnormality. No lymphadenopathy. No pericardial effusion. Visualized abdomen: Multiple hepatic cysts. Bones: No osseous abnormalities. SINGLE SMALL DISTAL SUBSEGMENTAL PULMONARY ARTERIAL EMBOLUS IN THE RIGHT LOWER LOBE. NO BIBASILAR INFILTRATES SINCE FEBRUARY 2019. SCATTERED METASTATIC LUNG NODULES AGAIN NOTED. THE POSSIBILITY THAT THIS SMALL EMBOLUS IS SECONDARY TO TUMOR IS  QUESTIONED. Xr Chest Portable    Result Date: 3/26/2019  EXAMINATION: CHEST AP ERECT PORTABLE  CLINICAL HISTORY: Short of breath COMPARISONS: January 9, 2019  FINDINGS: Single view of the chest are submitted. The cardiac silhouette is of normal size configuration. The mediastinum is unremarkable. Pulmonary vascular is attenuated, lungs are hyperinflated and there is some widening of the AP diameter the chest and coarsening of the interstitium. Right sided trachea. Bibasilar areas atelectasis. Faint nodule within the left lower lobe measuring approximately 1.5 cm. No focal infiltrates. No effusions. Pneumothoraces. FAINT NODULE SEEN WITHIN LEFT LOWER LOBE. THE ADDITIONAL NODULES SEEN ON THE PET SCAN ARE NOT WELL APPRECIATED BY PLAIN FILM. [facility] PLEASE SEE PET-CT REPORT FOR ADDITIONAL DETAILS. RADIOGRAPHIC FINDINGS OF COPD. IMPRESSION AND SUGGESTION:  1.  Acute interstitial pneumonitis in both bases with radiographic features as well as clinical features suggestive of drug toxicity associated with chemotherapy, atypical infectious etiologies are less likely, malignant infiltrations also less likely  2. Acute hypoxic respiratory failure due to #1 in addition to underlying COPD  3. Tiny subsegmental pulmonary arterial filling defect which is difficult to ascertain, and would not explain patient's symptoms  4. Emphysema  Agree with systemic steroid, concur with assessment per  regarding management of possible pulmonary embolism, which is very doubtful. Empiric antibiotic therapy for now.   Improvement is expected to be slow, possible progression fibrosis cannot be excluded          Electronically signed by Chrystal Painter MD, FCCP on 4/22/2019 at 12:41 PM

## 2019-04-22 NOTE — PLAN OF CARE
Nutrition Problem: Unintended weight loss  Intervention: Food and/or Nutrient Delivery: Continue current diet, Continue current ONS(General diet; standard high calorie ONS TID)  Nutritional Goals: po intake > 75% of meals and supplements.  weight gain > 94 lb

## 2019-04-23 LAB
ANION GAP SERPL CALCULATED.3IONS-SCNC: 15 MEQ/L (ref 9–15)
ANISOCYTOSIS: ABNORMAL
BANDED NEUTROPHILS RELATIVE PERCENT: 4 % (ref 5–11)
BASOPHILS ABSOLUTE: 0 K/UL (ref 0–0.2)
BASOPHILS RELATIVE PERCENT: 0.2 %
BUN BLDV-MCNC: 15 MG/DL (ref 8–23)
CALCIUM SERPL-MCNC: 9.3 MG/DL (ref 8.5–9.9)
CHLORIDE BLD-SCNC: 105 MEQ/L (ref 95–107)
CO2: 22 MEQ/L (ref 20–31)
CREAT SERPL-MCNC: 0.47 MG/DL (ref 0.5–0.9)
EOSINOPHILS ABSOLUTE: 0 K/UL (ref 0–0.7)
EOSINOPHILS RELATIVE PERCENT: 0.2 %
GFR AFRICAN AMERICAN: >60
GFR NON-AFRICAN AMERICAN: >60
GLUCOSE BLD-MCNC: 83 MG/DL (ref 70–99)
HCT VFR BLD CALC: 26.9 % (ref 37–47)
HEMOGLOBIN: 9 G/DL (ref 12–16)
LYMPHOCYTES ABSOLUTE: 2 K/UL (ref 1–4.8)
LYMPHOCYTES RELATIVE PERCENT: 5 %
MCH RBC QN AUTO: 32.5 PG (ref 27–31.3)
MCHC RBC AUTO-ENTMCNC: 33.4 % (ref 33–37)
MCV RBC AUTO: 97.3 FL (ref 82–100)
MONOCYTES ABSOLUTE: 1.2 K/UL (ref 0.2–0.8)
MONOCYTES RELATIVE PERCENT: 2.9 %
NEUTROPHILS ABSOLUTE: 37.5 K/UL (ref 1.4–6.5)
NEUTROPHILS RELATIVE PERCENT: 89 %
PDW BLD-RTO: 17.3 % (ref 11.5–14.5)
PLATELET # BLD: 401 K/UL (ref 130–400)
PLATELET SLIDE REVIEW: ABNORMAL
POTASSIUM REFLEX MAGNESIUM: 4.3 MEQ/L (ref 3.4–4.9)
RBC # BLD: 2.77 M/UL (ref 4.2–5.4)
SODIUM BLD-SCNC: 142 MEQ/L (ref 135–144)
WBC # BLD: 40.3 K/UL (ref 4.8–10.8)

## 2019-04-23 PROCEDURE — 94664 DEMO&/EVAL PT USE INHALER: CPT

## 2019-04-23 PROCEDURE — 2580000003 HC RX 258: Performed by: INTERNAL MEDICINE

## 2019-04-23 PROCEDURE — 6370000000 HC RX 637 (ALT 250 FOR IP): Performed by: INTERNAL MEDICINE

## 2019-04-23 PROCEDURE — 2060000000 HC ICU INTERMEDIATE R&B

## 2019-04-23 PROCEDURE — 94640 AIRWAY INHALATION TREATMENT: CPT

## 2019-04-23 PROCEDURE — 85025 COMPLETE CBC W/AUTO DIFF WBC: CPT

## 2019-04-23 PROCEDURE — 6360000002 HC RX W HCPCS: Performed by: INTERNAL MEDICINE

## 2019-04-23 PROCEDURE — 93010 ELECTROCARDIOGRAM REPORT: CPT | Performed by: INTERNAL MEDICINE

## 2019-04-23 PROCEDURE — 80048 BASIC METABOLIC PNL TOTAL CA: CPT

## 2019-04-23 PROCEDURE — 94667 MNPJ CHEST WALL 1ST: CPT

## 2019-04-23 PROCEDURE — 94760 N-INVAS EAR/PLS OXIMETRY 1: CPT

## 2019-04-23 PROCEDURE — 2700000000 HC OXYGEN THERAPY PER DAY

## 2019-04-23 PROCEDURE — 6370000000 HC RX 637 (ALT 250 FOR IP): Performed by: HOSPITALIST

## 2019-04-23 PROCEDURE — 2580000003 HC RX 258: Performed by: HOSPITALIST

## 2019-04-23 PROCEDURE — 99232 SBSQ HOSP IP/OBS MODERATE 35: CPT | Performed by: INTERNAL MEDICINE

## 2019-04-23 PROCEDURE — 36415 COLL VENOUS BLD VENIPUNCTURE: CPT

## 2019-04-23 RX ORDER — ACETAMINOPHEN 325 MG/1
650 TABLET ORAL EVERY 8 HOURS SCHEDULED
Status: DISCONTINUED | OUTPATIENT
Start: 2019-04-23 | End: 2019-05-02 | Stop reason: HOSPADM

## 2019-04-23 RX ORDER — HYDROCODONE BITARTRATE AND ACETAMINOPHEN 5; 325 MG/1; MG/1
1 TABLET ORAL EVERY 6 HOURS PRN
Status: DISCONTINUED | OUTPATIENT
Start: 2019-04-23 | End: 2019-05-02 | Stop reason: HOSPADM

## 2019-04-23 RX ADMIN — METHYLPREDNISOLONE SODIUM SUCCINATE 40 MG: 40 INJECTION, POWDER, FOR SOLUTION INTRAMUSCULAR; INTRAVENOUS at 09:17

## 2019-04-23 RX ADMIN — Medication 10 ML: at 09:18

## 2019-04-23 RX ADMIN — Medication 10 ML: at 21:26

## 2019-04-23 RX ADMIN — IPRATROPIUM BROMIDE AND ALBUTEROL SULFATE 1 AMPULE: .5; 3 SOLUTION RESPIRATORY (INHALATION) at 19:39

## 2019-04-23 RX ADMIN — ASPIRIN 81 MG 81 MG: 81 TABLET ORAL at 09:15

## 2019-04-23 RX ADMIN — ACETAMINOPHEN 650 MG: 325 TABLET ORAL at 13:54

## 2019-04-23 RX ADMIN — FAMOTIDINE 20 MG: 20 TABLET ORAL at 09:15

## 2019-04-23 RX ADMIN — HYDROCODONE BITARTRATE AND ACETAMINOPHEN 1 TABLET: 5; 325 TABLET ORAL at 19:30

## 2019-04-23 RX ADMIN — CEFTRIAXONE SODIUM 1 G: 1 INJECTION, POWDER, FOR SOLUTION INTRAMUSCULAR; INTRAVENOUS at 11:00

## 2019-04-23 RX ADMIN — IPRATROPIUM BROMIDE AND ALBUTEROL SULFATE 1 AMPULE: .5; 3 SOLUTION RESPIRATORY (INHALATION) at 13:33

## 2019-04-23 RX ADMIN — IPRATROPIUM BROMIDE AND ALBUTEROL SULFATE 1 AMPULE: .5; 3 SOLUTION RESPIRATORY (INHALATION) at 07:04

## 2019-04-23 RX ADMIN — ACETAMINOPHEN 650 MG: 325 TABLET ORAL at 03:24

## 2019-04-23 RX ADMIN — GUAIFENESIN 600 MG: 600 TABLET, EXTENDED RELEASE ORAL at 21:24

## 2019-04-23 RX ADMIN — ENOXAPARIN SODIUM 40 MG: 40 INJECTION SUBCUTANEOUS at 09:17

## 2019-04-23 RX ADMIN — AMLODIPINE BESYLATE 5 MG: 5 TABLET ORAL at 09:15

## 2019-04-23 RX ADMIN — HYDROCODONE BITARTRATE AND ACETAMINOPHEN 1 TABLET: 5; 325 TABLET ORAL at 11:01

## 2019-04-23 RX ADMIN — FOLIC ACID 1 MG: 1 TABLET ORAL at 09:15

## 2019-04-23 RX ADMIN — GUAIFENESIN 600 MG: 600 TABLET, EXTENDED RELEASE ORAL at 09:15

## 2019-04-23 RX ADMIN — FAMOTIDINE 20 MG: 20 TABLET ORAL at 21:24

## 2019-04-23 RX ADMIN — TIOTROPIUM BROMIDE 18 MCG: 18 CAPSULE ORAL; RESPIRATORY (INHALATION) at 07:04

## 2019-04-23 RX ADMIN — MULTIPLE VITAMINS W/ MINERALS TAB 1 TABLET: TAB at 09:15

## 2019-04-23 RX ADMIN — ACETAMINOPHEN 650 MG: 325 TABLET ORAL at 21:24

## 2019-04-23 ASSESSMENT — PAIN DESCRIPTION - LOCATION
LOCATION: CHEST

## 2019-04-23 ASSESSMENT — PAIN SCALES - GENERAL
PAINLEVEL_OUTOF10: 5
PAINLEVEL_OUTOF10: 4
PAINLEVEL_OUTOF10: 5
PAINLEVEL_OUTOF10: 3
PAINLEVEL_OUTOF10: 4
PAINLEVEL_OUTOF10: 5
PAINLEVEL_OUTOF10: 4

## 2019-04-23 ASSESSMENT — PAIN DESCRIPTION - PAIN TYPE
TYPE: CHRONIC PAIN

## 2019-04-23 ASSESSMENT — PAIN DESCRIPTION - FREQUENCY: FREQUENCY: CONTINUOUS

## 2019-04-23 ASSESSMENT — PAIN DESCRIPTION - DESCRIPTORS: DESCRIPTORS: ACHING

## 2019-04-23 NOTE — PROGRESS NOTES
Hospitalist Progress Note  4/23/2019 5:40 PM    Assessment and Plan:   1. Acute interstitial Pneumonitis related to drug (chemo) toxicity vs infection resulting in acute on chronic hypoxic respiratory failure with hypoxia and COPD exacerbation ( states she usually uses oxygen intermittently and has needed it continuously): Pulmonology consult, IV Steroids AM CXR planned. ABGs per pulm. Acapella, Incentive spirometry, Duonebs Q4hr, Guaneficine. 2. Severe protein calorie malnutrition: Ordered PO supplemental Nutrition. Dietitian evaluated patient. Daily weights, Calorie count. 3. 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 consulted Palliative care. Pain meds ordered. 4. Leukopenia treated with Granix: Oncology on board. Now Leukocytosis likely in part due to demarginalization due to steroids as well. Diet: DIET GENERAL;  5. Dietary Nutrition Supplements: Standard High Calorie Oral Supplement  6. Advance Directive: Full Code . 7. DVT prophylaxis: Chemical prophylaxis SQ adjusting dose for lovenox currently dose of 30mg not correct dose for normal renal function of patient. 40mg QD is ppx dosing which is of importance considering hypercoagulable state  8. Discharge planning: SW on board. Will discharge once medically stable and cleared by all consultants. 9. High Risk Readmission Screening Tool Score Noted. Additionally, the following hospital problems were addressed:  Principal Problem:    Pulmonary embolism on right [facility])  Active Problems:    COPD exacerbation ([facility] [address]    COPD (chronic obstructive pulmonary disease) (HCC)    Tobacco abuse    Adenocarcinoma of right lung (HCC)    Malignant neoplasm of middle lobe of right lung (HCC)    Secondary malignant neoplasm of left lung (HCC)    B12 deficiency    Anemia    Severe malnutrition (HCC)  Resolved Problems:    * No resolved hospital problems.  *      ** Total time spent reviewing medical records, evaluating patient,

## 2019-04-23 NOTE — PROGRESS NOTES
INPATIENT PROGRESS NOTES    PATIENT NAME: Jenna Butler  MRN: [id number]  SERVICE DATE:  April 23, 2019   SERVICE TIME:  12:34 PM      PRIMARY SERVICE: Pulmonary Disease    CHIEF COMPLAINTS: Dyspnea and dry cough     INTERVAL HPI: Patient seen and examined at bedside, Interval Notes, orders reviewed. Nursing notes noted  Patient reports no significant change since yesterday, she remains dyspneic with any exertion, cough which is mostly nonproductive, feeling that she has some thick phlegm she is not able to expectorate, she is up to 5 L of oxygen per nasal cannula. OBJECTIVE    Body mass index is 19.02 kg/m². PHYSICAL EXAM:  Vitals:  /60   Pulse 94   Temp 98.1 °F (36.7 °C) (Oral)   Resp 19   Ht 5' 2\" (1.575 m)   Wt 104 lb (47.2 kg)   SpO2 96%   BMI 19.02 kg/m²   General: Patient is  Alert, awake . comfortable in bed, No distress. Head: Atraumatic , Normocephalic   Eyes: PERRL. No icteric sclera. No conjunctival injection. No discharge   ENT: No nasal  discharge. Pharynx clear. Neck:  Trachea midline. No thyromegaly, no JVD, No cervical adenopathy. Chest : Adequate effort, symmetric bilateral excursions  Lung : Diminished breath sounds bilaterally, coarse inspiratory crackles in the bases bilaterally  Heart[de-identified] Regular rhythm and rate. No mumur ,  Rub or gallop  ABD: Benign. Non-tender. Non-distended. No masses or organmegaly. Normal bowel sounds. EXT: No significant Pitting edema both lower extremities , No Cyanosis No clubbing  Neuro: no focal weakness  Skin: Warm and dry. No erythema or rash on exposed extremities.       DATA:   Recent Labs     04/22/19 0541 04/23/19  0547   WBC 36.9* 40.3*   HGB 9.6* 9.0*   HCT 28.1* 26.9*   MCV 98.5 97.3    401*     Recent Labs     04/22/19  0541 04/23/19  0547    142   K 4.3 4.3    105   CO2 23 22   BUN 15 15   CREATININE 0.56 0.47*   GLUCOSE 97 83   CALCIUM 8.9 9.3   LABGLOM >60.0 >60.0   GFRAA >60.0 >60.0       No results for input(s): PHART, YBO2TIK, PO2ART, IVY0APL, BEART, Q3IKPVLD in the last 72 hours. O2 Device: Nasal cannula  O2 Flow Rate (L/min): 5 L/min    DIET GENERAL;  Dietary Nutrition Supplements: Standard High Calorie Oral Supplement     MEDICATIONS during current hospitalization:    Continuous Infusions:    Scheduled Meds:   acetaminophen  650 mg Oral 3 times per day    enoxaparin  40 mg Subcutaneous Daily    methylPREDNISolone  40 mg Intravenous Daily    amLODIPine  5 mg Oral Daily    folic acid  1 mg Oral Daily    therapeutic multivitamin-minerals  1 tablet Oral Daily    sennosides-docusate sodium  2 tablet Oral Daily    tiotropium  18 mcg Inhalation Daily    ipratropium-albuterol  1 ampule Inhalation TID    tbo-filgrastim  300 mcg Subcutaneous QPM    aspirin  81 mg Oral Daily    cefTRIAXone (ROCEPHIN) IV  1 g Intravenous Q24H    sodium chloride flush  10 mL Intravenous 2 times per day    famotidine  20 mg Oral BID    guaiFENesin  600 mg Oral BID    polyethylene glycol  17 g Oral Daily       PRN Meds:HYDROcodone 5 mg - acetaminophen, albuterol, sodium chloride flush, magnesium hydroxide, ondansetron    Radiology  Ct Head W Wo Contrast    Result Date: 4/3/2019  COMPARISON: No prior studies available for comparison. HISTORY: C34.2 Malignant neoplasm of middle lobe of right lung ([facility] [address] ICD10 TECHNIQUE: CT HEAD W WO CONTRAST FINDINGS: Ventricles and sulci are unremarkable in size for a patient this age. No areas of abnormal density, hemorrhage, enhancement or mass effect are seen in the brain. A large lytic area is seen in the posterior parietal region on the right that measures 1 cm. It appears well circumscribed and appears to be a lipoma. A 1.6 mm lytic area is seen on the inner table of the skull and the occipital region on the right (series 3, image 13). There are other tiny 3 to 4 mm lytic area is seen within the skull that are  thought to be vascular channels.  In the right frontal region a 6 mm oval-shaped focus is seen series 3, image 19). No abnormal enhancement of the brain parenchyma. In the skull there are multiple lucent area seen but most are thought to be vascular channels for emissary veins or arachnoid granulations. A larger area seen in the skull posterior appears to be fat. If patient is symptomatic consider MRI. All CT scans at this facility use dose modulation, iterative reconstruction, and/or weight based dosing when appropriate to reduce radiation dose to as low as reasonably achievable. Cta Chest W Wo Contrast    Result Date: 4/19/2019  EXAMINATION: CTA CHEST W WO CONTRAST  DATE AND TIME:4/19/2019 6:15 PM CLINICAL HISTORY: Acute chest pain. Shortness of breath  CP; hx lung cancer last chemo was 4/09/2019. CP with inspiration. PE vs pneumonia. Lungs clear on exam.  COMPARISON: None available. TECHNIQUE: Helical axial CTA of the chest was performed following the intravenous administration of 100 mL Optiray 320 intravenous contrast.  2D images were reconstructed in the axial and coronal planes. 3-D MIP images were generated in the coronal plane. Images were reviewed on the PACS workstation. All images including the 3D MIPS were permanently archived. All CT scans at this facility use dose modulation, iterative reconstruction, and/or weight based dosing when appropriate to reduce radiation dose to as low as reasonably achievable. FINDINGS:  Embolus: There is a small intraluminal filling defect involving a distal subsegmental pulmonary artery branch to the right lower lobe . Findings are consistent with a small embolus. No other sites of pulmonary embolus. The possibility of tumor  embolus as opposed to thrombotic embolus is questioned but differentiating these can be extremely difficult. There are bilateral irregular pulmonary nodules on a background of severe emphysema and nonspecific infiltrates at the lung bases.  These infiltrates have developed since the PET scan examination of this patient on February 1, 2019. The possibility that some of these changes are related to lymphangitic spread of tumor is also questioned. Mediastinum: No other significant abnormality. No lymphadenopathy. No pericardial effusion. Visualized abdomen: Multiple hepatic cysts. Bones: No osseous abnormalities. SINGLE SMALL DISTAL SUBSEGMENTAL PULMONARY ARTERIAL EMBOLUS IN THE RIGHT LOWER LOBE. NO BIBASILAR INFILTRATES SINCE FEBRUARY 2019. SCATTERED METASTATIC LUNG NODULES AGAIN NOTED. THE POSSIBILITY THAT THIS SMALL EMBOLUS IS SECONDARY TO TUMOR IS  QUESTIONED. Xr Chest Portable    Result Date: 3/26/2019  EXAMINATION: CHEST AP ERECT PORTABLE  CLINICAL HISTORY: Short of breath COMPARISONS: January 9, 2019  FINDINGS: Single view of the chest are submitted. The cardiac silhouette is of normal size configuration. The mediastinum is unremarkable. Pulmonary vascular is attenuated, lungs are hyperinflated and there is some widening of the AP diameter the chest and coarsening of the interstitium. Right sided trachea. Bibasilar areas atelectasis. Faint nodule within the left lower lobe measuring approximately 1.5 cm. No focal infiltrates. No effusions. Pneumothoraces. FAINT NODULE SEEN WITHIN LEFT LOWER LOBE. THE ADDITIONAL NODULES SEEN ON THE PET SCAN ARE NOT WELL APPRECIATED BY PLAIN FILM. [facility] PLEASE SEE PET-CT REPORT FOR ADDITIONAL DETAILS. RADIOGRAPHIC FINDINGS OF COPD. IMPRESSION AND SUGGESTION:  1. Acute interstitial pneumonitis, likely associated with drug toxicity, versus atypical infection, and much less likely malignant lymphangitic infiltration  2. Acute hypoxic respiratory failure due to #1 in addition to underlying COPD  3.  Tiny subsegmental pulmonary arterial filling defect which is difficult to ascertain, and would not explain patient's symptoms  4.  Emphysema, with mild exacerbation associated with pneumonitis  Continue all current treatment for now, repeat chest x-ray in a.m., ABGs tomorrow, we'll continue to follow                Electronically signed by Ronnie Oneill MD, FCCP on 4/23/2019 at 12:34 PM

## 2019-04-23 NOTE — PROGRESS NOTES
[facility] EMERGENCY [facility] AT [place] Respiratory Therapy Evaluation   Current Order:  Duoneb tid and albuterol q2 prn      Home Regimen: tid      Ordering Physician: Nelida  Re-evaluation Date:  4/26     Diagnosis: PE      Patient Status: Stable / Unstable + Physician notified    The following MDI Criteria must be met in order to convert aerosol to MDI with spacer. If unable to meet, MDI will be converted to aerosol:  []  Patient able to demonstrate the ability to use MDI effectively  []  Patient alert and cooperative  []  Patient able to take deep breath with 5-10 second hold  []  Medication(s) available in this delivery method   []  Peak flow greater than or equal to 200 ml/min            Current Order Substituted To  (same drug, same frequency)   Aerosol to MDI [] Albuterol Sulfate 0.083% unit dose by aerosol Albuterol Sulfate MDI 2 puffs by inhalation with spacer    [] Levalbuterol 1.25 mg unit dose by aerosol Levalbuterol MDI 2 puffs by inhalation with spacer    [] Levalbuterol 0.63 mg unit dose by aerosol Levalbuterol MDI 2 puffs by inhalation with spacer    [] Ipratropium Bromide 0.02% unit dose by aerosol Ipratropium Bromide MDI 2 puffs by inhalation with spacer    [] Duoneb (Ipratropium + Albuterol) unit dose by aerosol Ipratropium MDI + Albuterol MDI 2 puffs by inhalation w/spacer   MDI to Aerosol [] Albuterol Sulfate MDI Albuterol Sulfate 0.083% unit dose by aerosol    [] Levalbuterol MDI 2 puffs by inhalation Levalbuterol 1.25 mg unit dose by aerosol    [] Ipratropium Bromide MDI by inhalation Ipratropium Bromide 0.02% unit dose by aerosol    [] Combivent (Ipratropium + Albuterol) MDI by inhalation Duoneb (Ipratropium + Albuterol) unit dose by aerosol   Treatment Assessment [Frequency/Schedule]:  Change frequency to: no changes per home freq __________________________________________________per Protocol, P&T, MEC      Points 0 1 2 3 4   Pulmonary Status  Non-Smoker  []   Smoking history   < 20 pack years  []   Smoking history  ? 20 pack years  []   Pulmonary Disorder  (acute or chronic)  [x]   Severe or Chronic w/ Exacerbation  []     Surgical Status No [x]   Surgeries     General []   Surgery Lower []   Abdominal Thoracic or []   Upper Abdominal Thoracic with  PulmonaryDisorder  []     Chest X-ray Clear/Not  Ordered     [x]  Chronic Changes  Results Pending  []  Infiltrates, atelectasis, pleural effusion, or edema  []  Infiltrates in more than one lobe []  Infiltrate + Atelectasis, &/or pleural effusion  []    Respiratory Pattern Regular,  RR = 12-20 [x]  Increased,  RR = 21-25 []  HENDERSON, irregular,  or RR = 26-30 []  Decreased FEV1  or RR = 31-35 []  Severe SOB, use  of accessory muscles, or RR ? 35  []    Mental Status Alert, oriented,  Cooperative [x]  Confused but Follows commands []  Lethargic or unable to follow commands []  Obtunded  []  Comatose  []    Breath Sounds Clear to  auscultation  []  Decreased unilaterally or  in bases only []  Decreased  bilaterally  [x]  Crackles or intermittent wheezes []  Wheezes []    Cough Strong, Spontan., & nonproductive [x]  Strong,  spontaneous, &  productive []  Weak,  Nonproductive []  Weak, productive or  with wheezes []  No spontaneous  cough or may require suctioning []    Level of Activity Ambulatory [x]  Ambulatory w/ Assist  []  Non-ambulatory []  Paraplegic []  Quadriplegic []    Total    Score:__5_____     Triage Score:___5_____      Tri       Triage:     1. (>20) Freq: Q3    2. (16-20) Freq: Q4   3. (11-15) Freq: QID & Albuterol Q2 PRN    4. (6-10) Freq: TID & Albuterol Q2 PRN    5. (0-5) Freq Q4prn

## 2019-04-24 ENCOUNTER — APPOINTMENT (OUTPATIENT)
Dept: GENERAL RADIOLOGY | Age: 74
DRG: 205 | End: 2019-04-24
Payer: MEDICARE

## 2019-04-24 LAB
ANION GAP SERPL CALCULATED.3IONS-SCNC: 16 MEQ/L (ref 9–15)
BASOPHILS ABSOLUTE: 0.1 K/UL (ref 0–0.2)
BASOPHILS RELATIVE PERCENT: 0.2 %
BLOOD CULTURE, ROUTINE: NORMAL
BUN BLDV-MCNC: 17 MG/DL (ref 8–23)
CALCIUM SERPL-MCNC: 8.8 MG/DL (ref 8.5–9.9)
CHLORIDE BLD-SCNC: 102 MEQ/L (ref 95–107)
CO2: 22 MEQ/L (ref 20–31)
CREAT SERPL-MCNC: 0.48 MG/DL (ref 0.5–0.9)
CULTURE, BLOOD 2: NORMAL
EOSINOPHILS ABSOLUTE: 0.1 K/UL (ref 0–0.7)
EOSINOPHILS RELATIVE PERCENT: 0.2 %
GFR AFRICAN AMERICAN: >60
GFR NON-AFRICAN AMERICAN: >60
GLUCOSE BLD-MCNC: 90 MG/DL (ref 70–99)
HCT VFR BLD CALC: 29 % (ref 37–47)
HEMOGLOBIN: 9.6 G/DL (ref 12–16)
LYMPHOCYTES ABSOLUTE: 1.8 K/UL (ref 1–4.8)
LYMPHOCYTES RELATIVE PERCENT: 6.8 %
MCH RBC QN AUTO: 32.8 PG (ref 27–31.3)
MCHC RBC AUTO-ENTMCNC: 33.2 % (ref 33–37)
MCV RBC AUTO: 98.7 FL (ref 82–100)
MONOCYTES ABSOLUTE: 1.6 K/UL (ref 0.2–0.8)
MONOCYTES RELATIVE PERCENT: 6 %
NEUTROPHILS ABSOLUTE: 23.1 K/UL (ref 1.4–6.5)
NEUTROPHILS RELATIVE PERCENT: 86.8 %
PDW BLD-RTO: 17.2 % (ref 11.5–14.5)
PLATELET # BLD: 441 K/UL (ref 130–400)
POTASSIUM REFLEX MAGNESIUM: 4.1 MEQ/L (ref 3.4–4.9)
RBC # BLD: 2.94 M/UL (ref 4.2–5.4)
SODIUM BLD-SCNC: 140 MEQ/L (ref 135–144)
WBC # BLD: 26.6 K/UL (ref 4.8–10.8)

## 2019-04-24 PROCEDURE — 6370000000 HC RX 637 (ALT 250 FOR IP): Performed by: INTERNAL MEDICINE

## 2019-04-24 PROCEDURE — 6360000002 HC RX W HCPCS: Performed by: INTERNAL MEDICINE

## 2019-04-24 PROCEDURE — 2700000000 HC OXYGEN THERAPY PER DAY

## 2019-04-24 PROCEDURE — 99232 SBSQ HOSP IP/OBS MODERATE 35: CPT | Performed by: INTERNAL MEDICINE

## 2019-04-24 PROCEDURE — 6370000000 HC RX 637 (ALT 250 FOR IP): Performed by: HOSPITALIST

## 2019-04-24 PROCEDURE — 2580000003 HC RX 258: Performed by: INTERNAL MEDICINE

## 2019-04-24 PROCEDURE — 2580000003 HC RX 258: Performed by: HOSPITALIST

## 2019-04-24 PROCEDURE — 36415 COLL VENOUS BLD VENIPUNCTURE: CPT

## 2019-04-24 PROCEDURE — 71046 X-RAY EXAM CHEST 2 VIEWS: CPT

## 2019-04-24 PROCEDURE — 94640 AIRWAY INHALATION TREATMENT: CPT

## 2019-04-24 PROCEDURE — 2060000000 HC ICU INTERMEDIATE R&B

## 2019-04-24 PROCEDURE — 80048 BASIC METABOLIC PNL TOTAL CA: CPT

## 2019-04-24 PROCEDURE — 85025 COMPLETE CBC W/AUTO DIFF WBC: CPT

## 2019-04-24 RX ADMIN — IPRATROPIUM BROMIDE AND ALBUTEROL SULFATE 1 AMPULE: .5; 3 SOLUTION RESPIRATORY (INHALATION) at 19:38

## 2019-04-24 RX ADMIN — GUAIFENESIN 600 MG: 600 TABLET, EXTENDED RELEASE ORAL at 08:23

## 2019-04-24 RX ADMIN — ACETAMINOPHEN 650 MG: 325 TABLET ORAL at 21:14

## 2019-04-24 RX ADMIN — IPRATROPIUM BROMIDE AND ALBUTEROL SULFATE 1 AMPULE: .5; 3 SOLUTION RESPIRATORY (INHALATION) at 07:44

## 2019-04-24 RX ADMIN — Medication 10 ML: at 08:26

## 2019-04-24 RX ADMIN — FAMOTIDINE 20 MG: 20 TABLET ORAL at 08:23

## 2019-04-24 RX ADMIN — ASPIRIN 81 MG 81 MG: 81 TABLET ORAL at 08:23

## 2019-04-24 RX ADMIN — ENOXAPARIN SODIUM 40 MG: 40 INJECTION SUBCUTANEOUS at 08:24

## 2019-04-24 RX ADMIN — Medication 10 ML: at 21:15

## 2019-04-24 RX ADMIN — MULTIPLE VITAMINS W/ MINERALS TAB 1 TABLET: TAB at 08:23

## 2019-04-24 RX ADMIN — FOLIC ACID 1 MG: 1 TABLET ORAL at 08:23

## 2019-04-24 RX ADMIN — HYDROCODONE BITARTRATE AND ACETAMINOPHEN 1 TABLET: 5; 325 TABLET ORAL at 19:01

## 2019-04-24 RX ADMIN — IPRATROPIUM BROMIDE AND ALBUTEROL SULFATE 1 AMPULE: .5; 3 SOLUTION RESPIRATORY (INHALATION) at 11:44

## 2019-04-24 RX ADMIN — METHYLPREDNISOLONE SODIUM SUCCINATE 40 MG: 40 INJECTION, POWDER, FOR SOLUTION INTRAMUSCULAR; INTRAVENOUS at 08:23

## 2019-04-24 RX ADMIN — GUAIFENESIN 600 MG: 600 TABLET, EXTENDED RELEASE ORAL at 21:14

## 2019-04-24 RX ADMIN — CEFTRIAXONE SODIUM 1 G: 1 INJECTION, POWDER, FOR SOLUTION INTRAMUSCULAR; INTRAVENOUS at 11:21

## 2019-04-24 RX ADMIN — FAMOTIDINE 20 MG: 20 TABLET ORAL at 21:14

## 2019-04-24 RX ADMIN — ACETAMINOPHEN 650 MG: 325 TABLET ORAL at 05:41

## 2019-04-24 RX ADMIN — ACETAMINOPHEN 650 MG: 325 TABLET ORAL at 13:41

## 2019-04-24 RX ADMIN — HYDROCODONE BITARTRATE AND ACETAMINOPHEN 1 TABLET: 5; 325 TABLET ORAL at 10:25

## 2019-04-24 RX ADMIN — AMLODIPINE BESYLATE 5 MG: 5 TABLET ORAL at 08:23

## 2019-04-24 ASSESSMENT — PAIN DESCRIPTION - LOCATION: LOCATION: CHEST

## 2019-04-24 ASSESSMENT — PAIN SCALES - GENERAL
PAINLEVEL_OUTOF10: 4
PAINLEVEL_OUTOF10: 5
PAINLEVEL_OUTOF10: 4
PAINLEVEL_OUTOF10: 10
PAINLEVEL_OUTOF10: 2
PAINLEVEL_OUTOF10: 5

## 2019-04-24 ASSESSMENT — PAIN DESCRIPTION - PAIN TYPE: TYPE: CHRONIC PAIN

## 2019-04-24 NOTE — CARE COORDINATION
Repeat cxr today showed bilateral inf. Receiving IV rocephin and po vibra. On O2 5l. Plan remains home with pall care. Will follow.   Electronically signed by Caitlin Reyes RN on 4/24/19 at 12:42 PM

## 2019-04-24 NOTE — PROGRESS NOTES
INPATIENT PROGRESS NOTES    PATIENT NAME: Suzi Yu  MRN: [id number]  SERVICE DATE:  April 24, 2019   SERVICE TIME:  10:15 AM      PRIMARY SERVICE: Pulmonary Disease    CHIEF COMPLAINTS: Dyspnea and dry cough     INTERVAL HPI: Patient seen and examined at bedside, Interval Notes, orders reviewed. Nursing notes noted  Patient is having shortness of breath with any exertion, even at rest. chest pain is less. c/o cough which is mostly nonproductive, feeling that she has some thick phlegm . she is not able to expectorate, she is up to 5 L of oxygen per nasal cannula SpO2 93. She had multiple attempt but unable to get ABG and then she refusing any more attempt to get ABG. OBJECTIVE    Body mass index is 19.72 kg/m². PHYSICAL EXAM:  Vitals:  /73   Pulse 84   Temp 97.8 °F (36.6 °C) (Oral)   Resp 20   Ht 5' 2\" (1.575 m)   Wt 107 lb 12.8 oz (48.9 kg)   SpO2 93%   BMI 19.72 kg/m²   General: Patient is  Alert, awake . comfortable in bed, No distress. Head: Atraumatic , Normocephalic   Eyes: PERRL. No icteric sclera. No conjunctival injection. No discharge   ENT: No nasal  discharge. Pharynx clear. Neck:  Trachea midline. No thyromegaly, no JVD, No cervical adenopathy. Chest : Adequate effort, symmetric bilateral excursions  Lung : Diminished breath sounds bilaterally, coarse inspiratory crackles in the bases bilaterally  Heart[de-identified] Regular rhythm and rate. No mumur ,  Rub or gallop  ABD: Benign. Non-tender. Non-distended. No masses or organmegaly. Normal bowel sounds. EXT: No significant Pitting edema both lower extremities , No Cyanosis No clubbing  Neuro: no focal weakness  Skin: Warm and dry. No erythema or rash on exposed extremities.       DATA:   Recent Labs     04/23/19  0547 04/24/19  0605   WBC 40.3* 26.6*   HGB 9.0* 9.6*   HCT 26.9* 29.0*   MCV 97.3 98.7   * 441*     Recent Labs     04/23/19  0547 04/24/19  0605    140   K 4.3 4.1    102   CO2 22 22   BUN 15 17   CREATININE 0.47* 0.48*   GLUCOSE 83 90   CALCIUM 9.3 8.8   LABGLOM >60.0 >60.0   GFRAA >60.0 >60.0       No results for input(s): PHART, NLJ1OXQ, PO2ART, PMR8ISR, BEART, D8HSIRAX in the last 72 hours. O2 Device: Nasal cannula  O2 Flow Rate (L/min): 5 L/min    DIET GENERAL;  Dietary Nutrition Supplements: Standard High Calorie Oral Supplement     MEDICATIONS during current hospitalization:    Continuous Infusions:    Scheduled Meds:   acetaminophen  650 mg Oral 3 times per day    enoxaparin  40 mg Subcutaneous Daily    methylPREDNISolone  40 mg Intravenous Daily    amLODIPine  5 mg Oral Daily    folic acid  1 mg Oral Daily    therapeutic multivitamin-minerals  1 tablet Oral Daily    sennosides-docusate sodium  2 tablet Oral Daily    tiotropium  18 mcg Inhalation Daily    ipratropium-albuterol  1 ampule Inhalation TID    aspirin  81 mg Oral Daily    cefTRIAXone (ROCEPHIN) IV  1 g Intravenous Q24H    sodium chloride flush  10 mL Intravenous 2 times per day    famotidine  20 mg Oral BID    guaiFENesin  600 mg Oral BID    polyethylene glycol  17 g Oral Daily       PRN Meds:HYDROcodone 5 mg - acetaminophen, albuterol, sodium chloride flush, magnesium hydroxide, ondansetron    Radiology  Ct Head W Wo Contrast    Result Date: 4/3/2019  COMPARISON: No prior studies available for comparison. HISTORY: C34.2 Malignant neoplasm of middle lobe of right lung ([facility] [address] ICD10 TECHNIQUE: CT HEAD W WO CONTRAST FINDINGS: Ventricles and sulci are unremarkable in size for a patient this age. No areas of abnormal density, hemorrhage, enhancement or mass effect are seen in the brain. A large lytic area is seen in the posterior parietal region on the right that measures 1 cm. It appears well circumscribed and appears to be a lipoma. A 1.6 mm lytic area is seen on the inner table of the skull and the occipital region on the right (series 3, image 13).  There are other tiny 3 to 4 mm lytic area is seen within the skull that are  thought to be vascular channels. In the right frontal region a 6 mm oval-shaped focus is seen series 3, image 19). No abnormal enhancement of the brain parenchyma. In the skull there are multiple lucent area seen but most are thought to be vascular channels for emissary veins or arachnoid granulations. A larger area seen in the skull posterior appears to be fat. If patient is symptomatic consider MRI. All CT scans at this facility use dose modulation, iterative reconstruction, and/or weight based dosing when appropriate to reduce radiation dose to as low as reasonably achievable. Cta Chest W Wo Contrast    Result Date: 4/19/2019  EXAMINATION: CTA CHEST W WO CONTRAST  DATE AND TIME:4/19/2019 6:15 PM CLINICAL HISTORY: Acute chest pain. Shortness of breath  CP; hx lung cancer last chemo was 4/09/2019. CP with inspiration. PE vs pneumonia. Lungs clear on exam.  COMPARISON: None available. TECHNIQUE: Helical axial CTA of the chest was performed following the intravenous administration of 100 mL Optiray 320 intravenous contrast.  2D images were reconstructed in the axial and coronal planes. 3-D MIP images were generated in the coronal plane. Images were reviewed on the PACS workstation. All images including the 3D MIPS were permanently archived. All CT scans at this facility use dose modulation, iterative reconstruction, and/or weight based dosing when appropriate to reduce radiation dose to as low as reasonably achievable. FINDINGS:  Embolus: There is a small intraluminal filling defect involving a distal subsegmental pulmonary artery branch to the right lower lobe . Findings are consistent with a small embolus. No other sites of pulmonary embolus. The possibility of tumor  embolus as opposed to thrombotic embolus is questioned but differentiating these can be extremely difficult. There are bilateral irregular pulmonary nodules on a background of severe emphysema and nonspecific infiltrates at the lung bases.  These infiltrates have developed since the PET scan examination of this patient on February 1, 2019. The possibility that some of these changes are related to lymphangitic spread of tumor is also questioned. Mediastinum: No other significant abnormality. No lymphadenopathy. No pericardial effusion. Visualized abdomen: Multiple hepatic cysts. Bones: No osseous abnormalities. SINGLE SMALL DISTAL SUBSEGMENTAL PULMONARY ARTERIAL EMBOLUS IN THE RIGHT LOWER LOBE. NO BIBASILAR INFILTRATES SINCE FEBRUARY 2019. SCATTERED METASTATIC LUNG NODULES AGAIN NOTED. THE POSSIBILITY THAT THIS SMALL EMBOLUS IS SECONDARY TO TUMOR IS  QUESTIONED. Xr Chest Portable    Result Date: 3/26/2019  EXAMINATION: CHEST AP ERECT PORTABLE  CLINICAL HISTORY: Short of breath COMPARISONS: January 9, 2019  FINDINGS: Single view of the chest are submitted. The cardiac silhouette is of normal size configuration. The mediastinum is unremarkable. Pulmonary vascular is attenuated, lungs are hyperinflated and there is some widening of the AP diameter the chest and coarsening of the interstitium. Right sided trachea. Bibasilar areas atelectasis. Faint nodule within the left lower lobe measuring approximately 1.5 cm. No focal infiltrates. No effusions. Pneumothoraces. FAINT NODULE SEEN WITHIN LEFT LOWER LOBE. THE ADDITIONAL NODULES SEEN ON THE PET SCAN ARE NOT WELL APPRECIATED BY PLAIN FILM. Ana Tristan PLEASE SEE PET-CT REPORT FOR ADDITIONAL DETAILS. RADIOGRAPHIC FINDINGS OF COPD.   XR CHEST STANDARD (2 VW) [[id number]] Collected: 04/24/19 0947      Order Status: Completed Specimen: Chest Updated: 04/24/19 0956     Narrative:       EXAMINATION: XR CHEST (2 VW)    REASON FOR EXAM: Bibasilar interstitial pneumonitis     FINDINGS: Reticular nodular infiltrate developing in the lower lung fields superimposed on chronic interstitial scarring and hyperinflation. Infiltrates are new since January 1, 2019. Infiltrates more extensive compared to March 26, 2019. Ill-defined 1.4   cm nodular density developing in the central right lung between the posterior sixth and seventh right ribs is new. Recent PET would suggest this is related to neoplasm. No new infiltrates in the upper lobes. Bones osteopenic but intact.      Impression:       BILATERAL INFILTRATES DEVELOPING SINCE THE FIRST OF THE YEAR AND WORSE SINCE THE STUDY ONE MONTH AGO. PROGRESSIVE INTERSTITIAL SPREAD OF TUMOR VERSUS ACUTE PNEUMONIA ARE THE TOP CONSIDERATIONS. 1.5 CM NODULAR MASS CENTRAL RIGHT LUNG SUSPICIOUS FOR NEOPLASM. IMPRESSION AND SUGGESTION:  1. Acute interstitial pneumonitis, likely associated with drug toxicity, versus atypical infection  2. Ca lung with  mets  3. Acute hypoxic respiratory failure due to #1 in addition to underlying COPD  4. Subsegmental pulmonary arterial filling defect. 5. Emphysema, with mild exacerbation associated with pneumonitis      CXR done today showing bilateral interstitial infiltrate, interstitial pneumonitis. 1.4 cm nodular lesion .    patient refused to have ABG done after multiple failed attempts by RT, continue O2 to keep spo2 90% or above, continue present treatment plan , will follow     Electronically signed by Todd Obrien MD, FCCP on 4/24/2019 at 10:15 AM

## 2019-04-24 NOTE — PROGRESS NOTES
Hospitalist Progress Note  4/24/2019 4:59 PM    Assessment and Plan:   1. Acute interstitial Pneumonitis related to drug (chemo) toxicity vs infection resulting in acute on chronic hypoxic respiratory failure with hypoxia and COPD exacerbation ( states she usually uses oxygen intermittently and has needed it continuously): Pulmonology consult, IV Steroids No ABG obtained at time of admission to compare to so will not reattempt to obtain at this time. Advised to breath through nose or try simple mask so she can breath through mouth. She does not want to wear mask on face but willing ot have mask resting on chest and blowby on face. Acapella, Incentive spirometry, Duonebs Q4hr, Guaneficine. 2. Severe protein calorie malnutrition: Ordered PO supplemental Nutrition. Dietitian evaluated patient. Daily weights, Calorie count. 3. 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 consulted Palliative care. Pain meds ordered. 4. Leukopenia treated with Granix: Oncology on board. Now Leukocytosis likely in part due to demarginalization due to steroids as well. Diet: DIET GENERAL;  5. Dietary Nutrition Supplements: Standard High Calorie Oral Supplement  6. Advance Directive: Full Code . 7. DVT prophylaxis: Chemical prophylaxis SQ adjusting dose for lovenox currently dose of 30mg not correct dose for normal renal function of patient. 40mg QD is ppx dosing which is of importance considering hypercoagulable state  8. Discharge planning: SW on board. Will discharge once medically stable and cleared by all consultants. 9. High Risk Readmission Screening Tool Score Noted.      Additionally, the following hospital problems were addressed:  Principal Problem:    Pulmonary embolism on right [facility])  Active Problems:    COPD exacerbation ([address]    COPD (chronic obstructive pulmonary disease) (HCC)    Tobacco abuse    Adenocarcinoma of right lung (HCC)    Malignant neoplasm of middle lobe of right lung ([address]    Secondary malignant neoplasm of left lung ([address]    B12 deficiency    Anemia    Severe malnutrition (HCC)  Resolved Problems:    * No resolved hospital problems. *      ** Total time spent reviewing medical records, evaluating patient, speaking with RN's and consultants where I was focused exclusively on this patient: 35 minutes. This time is excluding time spent performing procedures or significant events occurring earlier or later in the day requiring my attention and focus. Subjective:   Admit Date: 4/19/2019  PCP: DERECK Waller    No acute events overnight. Afebrile Telemetry reviewed. Upset this morning about failed attempts at ABGs. Describes painful attempts with pain radiating to fingertip. Feels her breathing is a little better. Is able to cough up sputum moreso now with acappella. Feels she is probably breathign through her mouth more so than her nose. Will try to breath through nasal cannula or place cannual in mouth as this may be why she is becoming SOB at times. Describes self as \"mouth breather\"  Pt denies chest pain, N/V, fevers or chills.  in the room. All questions answered. Objective:     Vitals:    04/24/19 0522 04/24/19 0732 04/24/19 0750 04/24/19 0814   BP:  122/73 122/73    Pulse:  81 81 84   Resp:  19 20 20   Temp:  97.9 °F (36.6 °C) 97.8 °F (36.6 °C)    TempSrc:  Oral Oral    SpO2:  97%  93%   Weight: 107 lb 12.8 oz (48.9 kg)      Height:         General appearance: Mild acute distress, lethargic Mild conversational dyspnea noted. Lungs:  Diminished (+) exp wheezes, Mild use of accessory muscles  Heart:  S1, S2 normal, RRR, no MRG appreciated  Abdomen: (+) BS, soft, non-tender; non distended no guarding or rigidity.    Extremities:  no cyanosis,No edema bilat lower exts, no calf tenderness bilaterally      Medications:      acetaminophen  650 mg Oral 3 times per day    enoxaparin  40 mg Subcutaneous Daily    methylPREDNISolone  40 mg Intravenous Daily    amLODIPine  5 mg Oral Daily    folic acid  1 mg Oral Daily    therapeutic multivitamin-minerals  1 tablet Oral Daily    sennosides-docusate sodium  2 tablet Oral Daily    tiotropium  18 mcg Inhalation Daily    ipratropium-albuterol  1 ampule Inhalation TID    aspirin  81 mg Oral Daily    cefTRIAXone (ROCEPHIN) IV  1 g Intravenous Q24H    sodium chloride flush  10 mL Intravenous 2 times per day    famotidine  20 mg Oral BID    guaiFENesin  600 mg Oral BID    polyethylene glycol  17 g Oral Daily       LABS Reviewed    IMAGING Reviewed    Manohar Sanchez MD  [facility] Hospitalist

## 2019-04-25 LAB
ANION GAP SERPL CALCULATED.3IONS-SCNC: 15 MEQ/L (ref 9–15)
ANISOCYTOSIS: ABNORMAL
ATYPICAL LYMPHOCYTE RELATIVE PERCENT: 2 %
BANDED NEUTROPHILS RELATIVE PERCENT: 1 % (ref 5–11)
BASOPHILS ABSOLUTE: 0 K/UL (ref 0–0.2)
BASOPHILS RELATIVE PERCENT: 0.1 %
BUN BLDV-MCNC: 16 MG/DL (ref 8–23)
CALCIUM SERPL-MCNC: 9.2 MG/DL (ref 8.5–9.9)
CHLORIDE BLD-SCNC: 99 MEQ/L (ref 95–107)
CO2: 25 MEQ/L (ref 20–31)
CREAT SERPL-MCNC: 0.52 MG/DL (ref 0.5–0.9)
EOSINOPHILS ABSOLUTE: 0 K/UL (ref 0–0.7)
EOSINOPHILS RELATIVE PERCENT: 0.2 %
GFR AFRICAN AMERICAN: >60
GFR NON-AFRICAN AMERICAN: >60
GLUCOSE BLD-MCNC: 85 MG/DL (ref 70–99)
HCT VFR BLD CALC: 31.3 % (ref 37–47)
HEMATOLOGY PATH CONSULT: NORMAL
HEMATOLOGY PATH CONSULT: YES
HEMOGLOBIN: 10.6 G/DL (ref 12–16)
LYMPHOCYTES ABSOLUTE: 3.4 K/UL (ref 1–4.8)
LYMPHOCYTES RELATIVE PERCENT: 16 %
MACROCYTES: ABNORMAL
MCH RBC QN AUTO: 33.2 PG (ref 27–31.3)
MCHC RBC AUTO-ENTMCNC: 33.9 % (ref 33–37)
MCV RBC AUTO: 98 FL (ref 82–100)
METAMYELOCYTES RELATIVE PERCENT: 14 %
MONOCYTES ABSOLUTE: 1.3 K/UL (ref 0.2–0.8)
MONOCYTES RELATIVE PERCENT: 6.6 %
MYELOCYTE PERCENT: 11 %
NEUTROPHILS ABSOLUTE: 14.1 K/UL (ref 1.4–6.5)
NEUTROPHILS RELATIVE PERCENT: 45 %
PDW BLD-RTO: 17.4 % (ref 11.5–14.5)
PLATELET # BLD: 505 K/UL (ref 130–400)
PLATELET SLIDE REVIEW: ABNORMAL
POTASSIUM REFLEX MAGNESIUM: 4.3 MEQ/L (ref 3.4–4.9)
PROMYELOCYTES PERCENT: 4 %
RBC # BLD: 3.19 M/UL (ref 4.2–5.4)
SMUDGE CELLS: 0.9
SODIUM BLD-SCNC: 139 MEQ/L (ref 135–144)
WBC # BLD: 18.8 K/UL (ref 4.8–10.8)

## 2019-04-25 PROCEDURE — 6370000000 HC RX 637 (ALT 250 FOR IP): Performed by: INTERNAL MEDICINE

## 2019-04-25 PROCEDURE — 85025 COMPLETE CBC W/AUTO DIFF WBC: CPT

## 2019-04-25 PROCEDURE — 2580000003 HC RX 258: Performed by: INTERNAL MEDICINE

## 2019-04-25 PROCEDURE — 80048 BASIC METABOLIC PNL TOTAL CA: CPT

## 2019-04-25 PROCEDURE — 6370000000 HC RX 637 (ALT 250 FOR IP): Performed by: HOSPITALIST

## 2019-04-25 PROCEDURE — 2700000000 HC OXYGEN THERAPY PER DAY

## 2019-04-25 PROCEDURE — 6360000002 HC RX W HCPCS: Performed by: INTERNAL MEDICINE

## 2019-04-25 PROCEDURE — 2580000003 HC RX 258: Performed by: HOSPITALIST

## 2019-04-25 PROCEDURE — 94640 AIRWAY INHALATION TREATMENT: CPT

## 2019-04-25 PROCEDURE — 99232 SBSQ HOSP IP/OBS MODERATE 35: CPT | Performed by: INTERNAL MEDICINE

## 2019-04-25 PROCEDURE — 94761 N-INVAS EAR/PLS OXIMETRY MLT: CPT

## 2019-04-25 PROCEDURE — 2060000000 HC ICU INTERMEDIATE R&B

## 2019-04-25 PROCEDURE — 36415 COLL VENOUS BLD VENIPUNCTURE: CPT

## 2019-04-25 RX ADMIN — MULTIPLE VITAMINS W/ MINERALS TAB 1 TABLET: TAB at 09:18

## 2019-04-25 RX ADMIN — AMLODIPINE BESYLATE 5 MG: 5 TABLET ORAL at 09:18

## 2019-04-25 RX ADMIN — HYDROCODONE BITARTRATE AND ACETAMINOPHEN 1 TABLET: 5; 325 TABLET ORAL at 03:51

## 2019-04-25 RX ADMIN — Medication 10 ML: at 11:17

## 2019-04-25 RX ADMIN — IPRATROPIUM BROMIDE AND ALBUTEROL SULFATE 1 AMPULE: .5; 3 SOLUTION RESPIRATORY (INHALATION) at 19:24

## 2019-04-25 RX ADMIN — Medication 10 ML: at 09:16

## 2019-04-25 RX ADMIN — ENOXAPARIN SODIUM 40 MG: 40 INJECTION SUBCUTANEOUS at 09:16

## 2019-04-25 RX ADMIN — ACETAMINOPHEN 650 MG: 325 TABLET ORAL at 05:52

## 2019-04-25 RX ADMIN — ASPIRIN 81 MG 81 MG: 81 TABLET ORAL at 09:18

## 2019-04-25 RX ADMIN — FAMOTIDINE 20 MG: 20 TABLET ORAL at 09:18

## 2019-04-25 RX ADMIN — GUAIFENESIN 600 MG: 600 TABLET, EXTENDED RELEASE ORAL at 20:13

## 2019-04-25 RX ADMIN — IPRATROPIUM BROMIDE AND ALBUTEROL SULFATE 1 AMPULE: .5; 3 SOLUTION RESPIRATORY (INHALATION) at 07:27

## 2019-04-25 RX ADMIN — HYDROCODONE BITARTRATE AND ACETAMINOPHEN 1 TABLET: 5; 325 TABLET ORAL at 20:13

## 2019-04-25 RX ADMIN — CEFTRIAXONE SODIUM 1 G: 1 INJECTION, POWDER, FOR SOLUTION INTRAMUSCULAR; INTRAVENOUS at 11:16

## 2019-04-25 RX ADMIN — METHYLPREDNISOLONE SODIUM SUCCINATE 40 MG: 40 INJECTION, POWDER, FOR SOLUTION INTRAMUSCULAR; INTRAVENOUS at 09:19

## 2019-04-25 RX ADMIN — FAMOTIDINE 20 MG: 20 TABLET ORAL at 20:13

## 2019-04-25 RX ADMIN — FOLIC ACID 1 MG: 1 TABLET ORAL at 09:18

## 2019-04-25 RX ADMIN — Medication 10 ML: at 20:14

## 2019-04-25 RX ADMIN — IPRATROPIUM BROMIDE AND ALBUTEROL SULFATE 1 AMPULE: .5; 3 SOLUTION RESPIRATORY (INHALATION) at 13:25

## 2019-04-25 RX ADMIN — ACETAMINOPHEN 650 MG: 325 TABLET ORAL at 13:59

## 2019-04-25 RX ADMIN — GUAIFENESIN 600 MG: 600 TABLET, EXTENDED RELEASE ORAL at 09:18

## 2019-04-25 ASSESSMENT — PAIN - FUNCTIONAL ASSESSMENT: PAIN_FUNCTIONAL_ASSESSMENT: PREVENTS OR INTERFERES SOME ACTIVE ACTIVITIES AND ADLS

## 2019-04-25 ASSESSMENT — PAIN SCALES - GENERAL
PAINLEVEL_OUTOF10: 4
PAINLEVEL_OUTOF10: 4
PAINLEVEL_OUTOF10: 5
PAINLEVEL_OUTOF10: 5
PAINLEVEL_OUTOF10: 4
PAINLEVEL_OUTOF10: 4

## 2019-04-25 ASSESSMENT — PAIN DESCRIPTION - ONSET: ONSET: ON-GOING

## 2019-04-25 ASSESSMENT — PAIN DESCRIPTION - PAIN TYPE: TYPE: CHRONIC PAIN

## 2019-04-25 ASSESSMENT — PAIN DESCRIPTION - LOCATION: LOCATION: CHEST

## 2019-04-25 ASSESSMENT — PAIN DESCRIPTION - ORIENTATION: ORIENTATION: OTHER (COMMENT)

## 2019-04-25 ASSESSMENT — PAIN DESCRIPTION - PROGRESSION: CLINICAL_PROGRESSION: NOT CHANGED

## 2019-04-25 NOTE — PROGRESS NOTES
Pt sitting in bed at this time. Up to sink independently throughout shift. Medications as ordered. Able to make needs known.

## 2019-04-25 NOTE — PROGRESS NOTES
INPATIENT PROGRESS NOTES    PATIENT NAME: Robi Alfaro  MRN: [id number]  SERVICE DATE:  April 25, 2019   SERVICE TIME:  11:16 AM      PRIMARY SERVICE: Pulmonary Disease    CHIEF COMPLAINTS: Dyspnea and dry cough     INTERVAL HPI: Patient seen and examined at bedside, Interval Notes, orders reviewed. Nursing notes noted  Patient is still having shortness of breath with any exertion, even at rest. chest pain is less. c/o cough which is mostly nonproductive. She has no fever or chills. No nausea vomiting diarrhea or abdominal pain. She is on O2 via nasal cannula O2 saturation 96%. OBJECTIVE    Body mass index is 19.84 kg/m². PHYSICAL EXAM:  Vitals:  /62   Pulse 83   Temp 98.2 °F (36.8 °C) (Oral)   Resp 20   Ht 5' 2\" (1.575 m)   Wt 108 lb 8 oz (49.2 kg)   SpO2 96%   BMI 19.84 kg/m²   General: Patient is  Alert, awake . comfortable in bed, No distress. Head: Atraumatic , Normocephalic   Eyes: PERRL. No icteric sclera. No conjunctival injection. No discharge   ENT: No nasal  discharge. Pharynx clear. Neck:  Trachea midline. No thyromegaly, no JVD, No cervical adenopathy. Chest : Adequate effort, symmetric bilateral excursions  Lung : Diminished breath sounds bilaterally, coarse inspiratory crackles in the bases bilaterally  Heart[de-identified] Regular rhythm and rate. No mumur ,  Rub or gallop  ABD: Benign. Non-tender. Non-distended. No masses or organmegaly. Normal bowel sounds. EXT: No significant Pitting edema both lower extremities , No Cyanosis No clubbing  Neuro: no focal weakness  Skin: Warm and dry. No erythema or rash on exposed extremities.       DATA:   Recent Labs     04/24/19  0605 04/25/19  0602   WBC 26.6* 18.8*   HGB 9.6* 10.6*   HCT 29.0* 31.3*   MCV 98.7 98.0   * 505*     Recent Labs     04/24/19  0605 04/25/19  0602    139   K 4.1 4.3    99   CO2 22 25   BUN 17 16   CREATININE 0.48* 0.52   GLUCOSE 90 85   CALCIUM 8.8 9.2   LABGLOM >60.0 >60.0   GFRAA >60.0 >60.0       No results for input(s): PHART, PZK0XKL, PO2ART, SFX2RDC, BEART, F2WHORNQ in the last 72 hours. O2 Device: Nasal cannula  O2 Flow Rate (L/min): 5 L/min    DIET GENERAL;  Dietary Nutrition Supplements: Standard High Calorie Oral Supplement     MEDICATIONS during current hospitalization:    Continuous Infusions:    Scheduled Meds:   acetaminophen  650 mg Oral 3 times per day    enoxaparin  40 mg Subcutaneous Daily    methylPREDNISolone  40 mg Intravenous Daily    amLODIPine  5 mg Oral Daily    folic acid  1 mg Oral Daily    therapeutic multivitamin-minerals  1 tablet Oral Daily    sennosides-docusate sodium  2 tablet Oral Daily    tiotropium  18 mcg Inhalation Daily    ipratropium-albuterol  1 ampule Inhalation TID    aspirin  81 mg Oral Daily    cefTRIAXone (ROCEPHIN) IV  1 g Intravenous Q24H    sodium chloride flush  10 mL Intravenous 2 times per day    famotidine  20 mg Oral BID    guaiFENesin  600 mg Oral BID    polyethylene glycol  17 g Oral Daily       PRN Meds:HYDROcodone 5 mg - acetaminophen, albuterol, sodium chloride flush, magnesium hydroxide, ondansetron    Radiology  Ct Head W Wo Contrast    Result Date: 4/3/2019  COMPARISON: No prior studies available for comparison. HISTORY: C34.2 Malignant neoplasm of middle lobe of right lung ([address] ICD10 TECHNIQUE: CT HEAD W WO CONTRAST FINDINGS: Ventricles and sulci are unremarkable in size for a patient this age. No areas of abnormal density, hemorrhage, enhancement or mass effect are seen in the brain. A large lytic area is seen in the posterior parietal region on the right that measures 1 cm. It appears well circumscribed and appears to be a lipoma. A 1.6 mm lytic area is seen on the inner table of the skull and the occipital region on the right (series 3, image 13). There are other tiny 3 to 4 mm lytic area is seen within the skull that are  thought to be vascular channels.  In the right frontal region a 6 mm oval-shaped focus is seen series 3, more extensive compared to March 26, 2019. Ill-defined 1.4   cm nodular density developing in the central right lung between the posterior sixth and seventh right ribs is new. Recent PET would suggest this is related to neoplasm. No new infiltrates in the upper lobes. Bones osteopenic but intact.      Impression:       BILATERAL INFILTRATES DEVELOPING SINCE THE FIRST OF THE YEAR AND WORSE SINCE THE STUDY ONE MONTH AGO. PROGRESSIVE INTERSTITIAL SPREAD OF TUMOR VERSUS ACUTE PNEUMONIA ARE THE TOP CONSIDERATIONS. 1.5 CM NODULAR MASS CENTRAL RIGHT LUNG SUSPICIOUS FOR NEOPLASM. IMPRESSION AND SUGGESTION:  1. Acute interstitial pneumonitis, likely associated with drug toxicity, versus atypical infection  2. Ca lung with  mets  3. Acute hypoxic respiratory failure due to #1 in addition to underlying COPD  4. Subsegmental pulmonary arterial filling defect. 5. Emphysema, with mild exacerbation associated with pneumonitis      CXR done yesterday showing bilateral interstitial infiltrate, interstitial pneumonitis. 1.4 cm nodular lesion . Follow-up chest x-ray in a.m.  continue O2 to keep spo2 90% or above, continue bronchodilator therapy. IV Solu-Medrol. .  continue present treatment plan , will follow     Electronically signed by Cruz Swann MD, [facility] on 4/25/2019 at 11:16 AM

## 2019-04-25 NOTE — CONSULTS
Inpatient consult to Palliative Care  Consult performed by: Jessica Weir RN  Consult ordered by: Raphael Callaway MD  Reason for consult: Symptom Management  Assessment/Recommendations: Consult noted. Will contact patient/family post discharge to discuss Palliative Care and schedule initial visit if appropriate.

## 2019-04-25 NOTE — PROGRESS NOTES
Hospitalist Progress Note  4/25/2019 1:41 PM    Assessment and Plan:   1. Acute interstitial Pneumonitis related to drug (chemo) toxicity vs infection resulting in acute on chronic hypoxic respiratory failure with hypoxia and COPD exacerbation ( states she usually uses oxygen intermittently and has needed it continuously): Pulmonology consult, IV Steroids No ABG obtained at time of admission to compare to so will not reattempt to obtain at this time. Advised to breath through nose or try simple mask so she can breath through mouth. She does not want to wear mask on face but willing ot have mask resting on chest and blowby on face. Acapella, Incentive spirometry, Duonebs Q4hr, Guaneficine. 2. Severe protein calorie malnutrition: Ordered PO supplemental Nutrition. Dietitian evaluated patient. Daily weights, Calorie count. 3. 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 consulted Palliative care. Pain meds ordered. 4. Leukopenia treated with Granix: Oncology on board. Now Leukocytosis likely in part due to demarginalization due to steroids as well. Diet: DIET GENERAL;  5. Dietary Nutrition Supplements: Standard High Calorie Oral Supplement  6. Advance Directive: Full Code . 7. DVT prophylaxis: Chemical prophylaxis SQ adjusting dose for lovenox currently dose of 30mg not correct dose for normal renal function of patient. 40mg QD is ppx dosing which is of importance considering hypercoagulable state  8. Discharge planning: SW on board. Will discharge once medically stable and cleared by all consultants. 9. High Risk Readmission Screening Tool Score Noted.      Additionally, the following hospital problems were addressed:  Principal Problem:    Pulmonary embolism on right [facility])  Active Problems:    COPD exacerbation ([address]    COPD (chronic obstructive pulmonary disease) (HCC)    Tobacco abuse    Adenocarcinoma of right lung (HCC)    Malignant neoplasm of middle lobe of right lung ([address]    Secondary malignant neoplasm of left lung ([facility] [address]    B12 deficiency    Anemia    Severe malnutrition (HCC)  Resolved Problems:    * No resolved hospital problems. *      ** Total time spent reviewing medical records, evaluating patient, speaking with RN's and consultants where I was focused exclusively on this patient: 35 minutes. This time is excluding time spent performing procedures or significant events occurring earlier or later in the day requiring my attention and focus. Subjective:   Admit Date: 4/19/2019  PCP: DERECK Fall    No acute events overnight. Afebrile Telemetry reviewed. No new complaints. Pt denies chest pain, N/V, fevers or chills. Objective:     Vitals:    04/25/19 0627 04/25/19 0721 04/25/19 0736 04/25/19 1326   BP:  121/62     Pulse:  83     Resp:  20     Temp:  98.2 °F (36.8 °C)     TempSrc:  Oral     SpO2:  98% 96% 95%   Weight: 108 lb 8 oz (49.2 kg)      Height:         General appearance: Mild acute distress, lethargic Mild conversational dyspnea noted. Lungs:  Diminished (+) exp wheezes, Mild use of accessory muscles  Heart:  S1, S2 normal, RRR, no MRG appreciated  Abdomen: (+) BS, soft, non-tender; non distended no guarding or rigidity.    Extremities:  no cyanosis,No edema bilat lower exts, no calf tenderness bilaterally      Medications:      acetaminophen  650 mg Oral 3 times per day    enoxaparin  40 mg Subcutaneous Daily    methylPREDNISolone  40 mg Intravenous Daily    amLODIPine  5 mg Oral Daily    folic acid  1 mg Oral Daily    therapeutic multivitamin-minerals  1 tablet Oral Daily    sennosides-docusate sodium  2 tablet Oral Daily    tiotropium  18 mcg Inhalation Daily    ipratropium-albuterol  1 ampule Inhalation TID    aspirin  81 mg Oral Daily    cefTRIAXone (ROCEPHIN) IV  1 g Intravenous Q24H    sodium chloride flush  10 mL Intravenous 2 times per day    famotidine  20 mg Oral BID    guaiFENesin  600 mg Oral BID    polyethylene glycol  17 g Oral Daily       LABS Reviewed    IMAGING Reviewed    Ananya Craven MD  [facility]

## 2019-04-26 ENCOUNTER — APPOINTMENT (OUTPATIENT)
Dept: GENERAL RADIOLOGY | Age: 74
DRG: 205 | End: 2019-04-26
Payer: MEDICARE

## 2019-04-26 ENCOUNTER — APPOINTMENT (OUTPATIENT)
Dept: CT IMAGING | Age: 74
DRG: 205 | End: 2019-04-26
Payer: MEDICARE

## 2019-04-26 LAB
ANION GAP SERPL CALCULATED.3IONS-SCNC: 13 MEQ/L (ref 9–15)
BASOPHILS ABSOLUTE: 0.1 K/UL (ref 0–0.2)
BASOPHILS RELATIVE PERCENT: 0.7 %
BUN BLDV-MCNC: 19 MG/DL (ref 8–23)
CALCIUM SERPL-MCNC: 9 MG/DL (ref 8.5–9.9)
CHLORIDE BLD-SCNC: 99 MEQ/L (ref 95–107)
CO2: 24 MEQ/L (ref 20–31)
CREAT SERPL-MCNC: 0.52 MG/DL (ref 0.5–0.9)
EOSINOPHILS ABSOLUTE: 0 K/UL (ref 0–0.7)
EOSINOPHILS RELATIVE PERCENT: 0.2 %
GFR AFRICAN AMERICAN: >60
GFR NON-AFRICAN AMERICAN: >60
GLUCOSE BLD-MCNC: 90 MG/DL (ref 70–99)
HCT VFR BLD CALC: 28.9 % (ref 37–47)
HEMOGLOBIN: 9.8 G/DL (ref 12–16)
LYMPHOCYTES ABSOLUTE: 1.7 K/UL (ref 1–4.8)
LYMPHOCYTES RELATIVE PERCENT: 13.5 %
MCH RBC QN AUTO: 33.4 PG (ref 27–31.3)
MCHC RBC AUTO-ENTMCNC: 34.1 % (ref 33–37)
MCV RBC AUTO: 98 FL (ref 82–100)
MONOCYTES ABSOLUTE: 1.5 K/UL (ref 0.2–0.8)
MONOCYTES RELATIVE PERCENT: 11.8 %
NEUTROPHILS ABSOLUTE: 9.5 K/UL (ref 1.4–6.5)
NEUTROPHILS RELATIVE PERCENT: 73.8 %
PDW BLD-RTO: 17.6 % (ref 11.5–14.5)
PLATELET # BLD: 464 K/UL (ref 130–400)
POTASSIUM REFLEX MAGNESIUM: 4.6 MEQ/L (ref 3.4–4.9)
RBC # BLD: 2.95 M/UL (ref 4.2–5.4)
SODIUM BLD-SCNC: 136 MEQ/L (ref 135–144)
WBC # BLD: 12.9 K/UL (ref 4.8–10.8)

## 2019-04-26 PROCEDURE — 6370000000 HC RX 637 (ALT 250 FOR IP): Performed by: INTERNAL MEDICINE

## 2019-04-26 PROCEDURE — 94640 AIRWAY INHALATION TREATMENT: CPT

## 2019-04-26 PROCEDURE — 6370000000 HC RX 637 (ALT 250 FOR IP): Performed by: HOSPITALIST

## 2019-04-26 PROCEDURE — 94760 N-INVAS EAR/PLS OXIMETRY 1: CPT

## 2019-04-26 PROCEDURE — 2580000003 HC RX 258: Performed by: INTERNAL MEDICINE

## 2019-04-26 PROCEDURE — 2580000003 HC RX 258: Performed by: HOSPITALIST

## 2019-04-26 PROCEDURE — 80048 BASIC METABOLIC PNL TOTAL CA: CPT

## 2019-04-26 PROCEDURE — 36415 COLL VENOUS BLD VENIPUNCTURE: CPT

## 2019-04-26 PROCEDURE — 2060000000 HC ICU INTERMEDIATE R&B

## 2019-04-26 PROCEDURE — 71046 X-RAY EXAM CHEST 2 VIEWS: CPT

## 2019-04-26 PROCEDURE — 6360000002 HC RX W HCPCS: Performed by: INTERNAL MEDICINE

## 2019-04-26 PROCEDURE — 85025 COMPLETE CBC W/AUTO DIFF WBC: CPT

## 2019-04-26 PROCEDURE — 94664 DEMO&/EVAL PT USE INHALER: CPT

## 2019-04-26 PROCEDURE — 99232 SBSQ HOSP IP/OBS MODERATE 35: CPT | Performed by: INTERNAL MEDICINE

## 2019-04-26 PROCEDURE — 71250 CT THORAX DX C-: CPT

## 2019-04-26 PROCEDURE — 2700000000 HC OXYGEN THERAPY PER DAY

## 2019-04-26 RX ADMIN — AMLODIPINE BESYLATE 5 MG: 5 TABLET ORAL at 08:41

## 2019-04-26 RX ADMIN — Medication 10 ML: at 08:43

## 2019-04-26 RX ADMIN — GUAIFENESIN 600 MG: 600 TABLET, EXTENDED RELEASE ORAL at 21:56

## 2019-04-26 RX ADMIN — FAMOTIDINE 20 MG: 20 TABLET ORAL at 08:41

## 2019-04-26 RX ADMIN — CEFTRIAXONE SODIUM 1 G: 1 INJECTION, POWDER, FOR SOLUTION INTRAMUSCULAR; INTRAVENOUS at 11:00

## 2019-04-26 RX ADMIN — IPRATROPIUM BROMIDE AND ALBUTEROL SULFATE 1 AMPULE: .5; 3 SOLUTION RESPIRATORY (INHALATION) at 19:33

## 2019-04-26 RX ADMIN — HYDROCODONE BITARTRATE AND ACETAMINOPHEN 1 TABLET: 5; 325 TABLET ORAL at 02:16

## 2019-04-26 RX ADMIN — FOLIC ACID 1 MG: 1 TABLET ORAL at 08:41

## 2019-04-26 RX ADMIN — IPRATROPIUM BROMIDE AND ALBUTEROL SULFATE 1 AMPULE: .5; 3 SOLUTION RESPIRATORY (INHALATION) at 15:05

## 2019-04-26 RX ADMIN — HYDROCODONE BITARTRATE AND ACETAMINOPHEN 1 TABLET: 5; 325 TABLET ORAL at 23:49

## 2019-04-26 RX ADMIN — HYDROCODONE BITARTRATE AND ACETAMINOPHEN 1 TABLET: 5; 325 TABLET ORAL at 08:41

## 2019-04-26 RX ADMIN — FAMOTIDINE 20 MG: 20 TABLET ORAL at 21:56

## 2019-04-26 RX ADMIN — ACETAMINOPHEN 650 MG: 325 TABLET ORAL at 13:11

## 2019-04-26 RX ADMIN — METHYLPREDNISOLONE SODIUM SUCCINATE 40 MG: 40 INJECTION, POWDER, FOR SOLUTION INTRAMUSCULAR; INTRAVENOUS at 08:41

## 2019-04-26 RX ADMIN — HYDROCODONE BITARTRATE AND ACETAMINOPHEN 1 TABLET: 5; 325 TABLET ORAL at 17:18

## 2019-04-26 RX ADMIN — IPRATROPIUM BROMIDE AND ALBUTEROL SULFATE 1 AMPULE: .5; 3 SOLUTION RESPIRATORY (INHALATION) at 08:40

## 2019-04-26 RX ADMIN — ASPIRIN 81 MG 81 MG: 81 TABLET ORAL at 08:41

## 2019-04-26 RX ADMIN — ACETAMINOPHEN 650 MG: 325 TABLET ORAL at 05:20

## 2019-04-26 RX ADMIN — ACETAMINOPHEN 650 MG: 325 TABLET ORAL at 21:56

## 2019-04-26 RX ADMIN — TIOTROPIUM BROMIDE 18 MCG: 18 CAPSULE ORAL; RESPIRATORY (INHALATION) at 08:40

## 2019-04-26 RX ADMIN — MULTIPLE VITAMINS W/ MINERALS TAB 1 TABLET: TAB at 08:41

## 2019-04-26 RX ADMIN — GUAIFENESIN 600 MG: 600 TABLET, EXTENDED RELEASE ORAL at 08:41

## 2019-04-26 RX ADMIN — ENOXAPARIN SODIUM 40 MG: 40 INJECTION SUBCUTANEOUS at 08:42

## 2019-04-26 RX ADMIN — Medication 10 ML: at 21:57

## 2019-04-26 ASSESSMENT — PAIN DESCRIPTION - LOCATION
LOCATION: BACK
LOCATION: CHEST
LOCATION: CHEST

## 2019-04-26 ASSESSMENT — PAIN SCALES - GENERAL
PAINLEVEL_OUTOF10: 6
PAINLEVEL_OUTOF10: 0
PAINLEVEL_OUTOF10: 3
PAINLEVEL_OUTOF10: 5
PAINLEVEL_OUTOF10: 6
PAINLEVEL_OUTOF10: 4
PAINLEVEL_OUTOF10: 0
PAINLEVEL_OUTOF10: 5
PAINLEVEL_OUTOF10: 0
PAINLEVEL_OUTOF10: 0

## 2019-04-26 ASSESSMENT — PAIN DESCRIPTION - DESCRIPTORS: DESCRIPTORS: ACHING;DISCOMFORT

## 2019-04-26 ASSESSMENT — PAIN DESCRIPTION - ORIENTATION: ORIENTATION: LOWER

## 2019-04-26 ASSESSMENT — PAIN DESCRIPTION - PAIN TYPE
TYPE: CHRONIC PAIN

## 2019-04-26 ASSESSMENT — PAIN DESCRIPTION - FREQUENCY: FREQUENCY: INTERMITTENT

## 2019-04-26 ASSESSMENT — PAIN DESCRIPTION - ONSET: ONSET: PROGRESSIVE

## 2019-04-26 ASSESSMENT — PAIN DESCRIPTION - PROGRESSION: CLINICAL_PROGRESSION: GRADUALLY WORSENING

## 2019-04-26 NOTE — PROGRESS NOTES
Nutrition Assessment (Low Risk)    Type and Reason for Visit: Reassess    Nutrition Recommendations: Continue current diet, Modify current ONS(Standard high calorie ONS BID; Clear ONS x1)     Nutrition Assessment:  Patient assessed for nutritional risk. Deemed to be at low risk at this time. Will continue to monitor for changes in status. Pt continues to improve from a nutrition standpoint with continued good po intake of meals and ONS. Will modify ONS to pt's preferences and continue monitoring.     Malnutrition Assessment:  · Malnutrition Status: Meets the criteria for severe malnutrition    Nutrition Risk Level   Risk Level: Low    Nutrition Diagnosis:   · Problem: Unintended weight loss  · Etiology: Insufficient energy/nutrient consumption, Catabolic illness, Impaired respiratory function-inability to consume food    Signs and symptoms: Intake 0-25%, Diet history of poor intake, Weight loss greater than or equal to 5% in 1 month    Nutrition Intervention:  Food and/or Delivery: Continue current diet, Modify current ONS(Standard high calorie ONS BID; Clear ONS x1)  Nutrition Education/Counseling/Coordination of Care:  Continued Inpatient Monitoring, Education Not Indicated      Electronically signed by Deric Vicente RD, LD on 4/26/19 at 10:22 AM

## 2019-04-26 NOTE — PROGRESS NOTES
minutes. This time is excluding time spent performing procedures or significant events occurring earlier or later in the day requiring my attention and focus. Subjective:   Admit Date: 4/19/2019  PCP: DERECK Whitaker    No acute events overnight. Afebrile Telemetry reviewed. No new complaints. Feels a little better. Still SOB on exertion Pt denies chest pain, N/V, fevers or chills. Objective:     Vitals:    04/25/19 2036 04/26/19 0725 04/26/19 0840 04/26/19 1412   BP: 116/63 126/71  95/65   Pulse: 97 76  99   Resp: 18 18 18 18   Temp: 97.5 °F (36.4 °C) 97.9 °F (36.6 °C)  98.1 °F (36.7 °C)   TempSrc: Oral Oral  Oral   SpO2: 97% 98% 92% 95%   Weight:       Height:         General appearance: Mild acute distress, lethargic Mild conversational dyspnea noted. Lungs:  Diminished (+) exp wheezes at bases, Mild use of accessory muscles  Heart:  S1, S2 normal, RRR, no MRG appreciated  Abdomen: (+) BS, soft, non-tender; non distended no guarding or rigidity.    Extremities:  no cyanosis,No edema bilat lower exts, no calf tenderness bilaterally      Medications:      acetaminophen  650 mg Oral 3 times per day    enoxaparin  40 mg Subcutaneous Daily    methylPREDNISolone  40 mg Intravenous Daily    amLODIPine  5 mg Oral Daily    folic acid  1 mg Oral Daily    therapeutic multivitamin-minerals  1 tablet Oral Daily    sennosides-docusate sodium  2 tablet Oral Daily    tiotropium  18 mcg Inhalation Daily    ipratropium-albuterol  1 ampule Inhalation TID    aspirin  81 mg Oral Daily    cefTRIAXone (ROCEPHIN) IV  1 g Intravenous Q24H    sodium chloride flush  10 mL Intravenous 2 times per day    famotidine  20 mg Oral BID    guaiFENesin  600 mg Oral BID    polyethylene glycol  17 g Oral Daily       LABS Reviewed    IMAGING Reviewed    Gallito Augustine MD  [facility]

## 2019-04-26 NOTE — PROGRESS NOTES
PHART, [id number], PO2ART, HII7MPD, BEART, [id number] in the last 72 hours. O2 Device: Nasal cannula  O2 Flow Rate (L/min): 5 L/min    DIET GENERAL;  Dietary Nutrition Supplements: Standard High Calorie Oral Supplement  Dietary Nutrition Supplements: Clear Liquid Oral Supplement     MEDICATIONS during current hospitalization:    Continuous Infusions:    Scheduled Meds:   acetaminophen  650 mg Oral 3 times per day    enoxaparin  40 mg Subcutaneous Daily    methylPREDNISolone  40 mg Intravenous Daily    amLODIPine  5 mg Oral Daily    folic acid  1 mg Oral Daily    therapeutic multivitamin-minerals  1 tablet Oral Daily    sennosides-docusate sodium  2 tablet Oral Daily    tiotropium  18 mcg Inhalation Daily    ipratropium-albuterol  1 ampule Inhalation TID    aspirin  81 mg Oral Daily    cefTRIAXone (ROCEPHIN) IV  1 g Intravenous Q24H    sodium chloride flush  10 mL Intravenous 2 times per day    famotidine  20 mg Oral BID    guaiFENesin  600 mg Oral BID    polyethylene glycol  17 g Oral Daily       PRN Meds:HYDROcodone 5 mg - acetaminophen, albuterol, sodium chloride flush, magnesium hydroxide, ondansetron    Radiology  Xr Chest Standard (2 Vw)    Result Date: 4/26/2019  EXAMINATION: XR CHEST (2 VW) CLINICAL HISTORY: Follow-up infiltrates COMPARISONS: March 26, 2019. April 19, 2019. April 24, 2019. FINDINGS: Frontal and lateral views of the chest were obtained. There are persisting bilateral infiltrates in a persisting smaller airspace opacities that have developed since March 26, 2019, were well depicted and well characterized on April 19, 2019 and have not changed since April 24, 2019. Individual pulmonary nodules are obscured and are now visible today. The heart has not enlarged. There is no appreciable pleural effusion. The mediastinum is not widened or shifted. The chest wall is unremarkable. CONCLUSION: PERSISTING ATYPICAL BASILAR AND LOWER LOBE INFILTRATES. NO EFFUSION IS DEVELOPED.     Xr Chest skull posterior appears to be fat. If patient is symptomatic consider MRI. All CT scans at this facility use dose modulation, iterative reconstruction, and/or weight based dosing when appropriate to reduce radiation dose to as low as reasonably achievable. Cta Chest W Wo Contrast    Result Date: 4/19/2019  EXAMINATION: CTA CHEST W WO CONTRAST  DATE AND TIME:4/19/2019 6:15 PM CLINICAL HISTORY: Acute chest pain. Shortness of breath  CP; hx lung cancer last chemo was 4/09/2019. CP with inspiration. PE vs pneumonia. Lungs clear on exam.  COMPARISON: None available. TECHNIQUE: Helical axial CTA of the chest was performed following the intravenous administration of 100 mL Optiray 320 intravenous contrast.  2D images were reconstructed in the axial and coronal planes. 3-D MIP images were generated in the coronal plane. Images were reviewed on the PACS workstation. All images including the 3D MIPS were permanently archived. All CT scans at this facility use dose modulation, iterative reconstruction, and/or weight based dosing when appropriate to reduce radiation dose to as low as reasonably achievable. FINDINGS:  Embolus: There is a small intraluminal filling defect involving a distal subsegmental pulmonary artery branch to the right lower lobe . Findings are consistent with a small embolus. No other sites of pulmonary embolus. The possibility of tumor  embolus as opposed to thrombotic embolus is questioned but differentiating these can be extremely difficult. There are bilateral irregular pulmonary nodules on a background of severe emphysema and nonspecific infiltrates at the lung bases. These infiltrates have developed since the PET scan examination of this patient on February 1, 2019. The possibility that some of these changes are related to lymphangitic spread of tumor is also questioned. Mediastinum: No other significant abnormality. No lymphadenopathy. No pericardial effusion.

## 2019-04-26 NOTE — FLOWSHEET NOTE
2015: PM assessment complete. Medicated with Coral per prn order for c/o chest pain secondary to PE. Denies other needs. 0000: Resting at intervals. 0215: Medicated for c/o chest pain rated 5 out 10. Will continue to monitor. 0600: Denies needs.      Electronically signed by Desirae Omalley RN on 4/26/2019 at 6:25 AM

## 2019-04-27 LAB
ANION GAP SERPL CALCULATED.3IONS-SCNC: 15 MEQ/L (ref 9–15)
BASOPHILS ABSOLUTE: 0.1 K/UL (ref 0–0.2)
BASOPHILS RELATIVE PERCENT: 0.4 %
BUN BLDV-MCNC: 19 MG/DL (ref 8–23)
CALCIUM SERPL-MCNC: 8.8 MG/DL (ref 8.5–9.9)
CHLORIDE BLD-SCNC: 99 MEQ/L (ref 95–107)
CO2: 24 MEQ/L (ref 20–31)
CREAT SERPL-MCNC: 0.43 MG/DL (ref 0.5–0.9)
EOSINOPHILS ABSOLUTE: 0.1 K/UL (ref 0–0.7)
EOSINOPHILS RELATIVE PERCENT: 0.7 %
GFR AFRICAN AMERICAN: >60
GFR NON-AFRICAN AMERICAN: >60
GLUCOSE BLD-MCNC: 93 MG/DL (ref 70–99)
HCT VFR BLD CALC: 32 % (ref 37–47)
HEMOGLOBIN: 10.9 G/DL (ref 12–16)
LYMPHOCYTES ABSOLUTE: 1.1 K/UL (ref 1–4.8)
LYMPHOCYTES RELATIVE PERCENT: 8.6 %
MCH RBC QN AUTO: 33.6 PG (ref 27–31.3)
MCHC RBC AUTO-ENTMCNC: 33.9 % (ref 33–37)
MCV RBC AUTO: 99.2 FL (ref 82–100)
MONOCYTES ABSOLUTE: 0.8 K/UL (ref 0.2–0.8)
MONOCYTES RELATIVE PERCENT: 6.6 %
NEUTROPHILS ABSOLUTE: 10.6 K/UL (ref 1.4–6.5)
NEUTROPHILS RELATIVE PERCENT: 83.7 %
PDW BLD-RTO: 17.2 % (ref 11.5–14.5)
PLATELET # BLD: 462 K/UL (ref 130–400)
POTASSIUM REFLEX MAGNESIUM: 4.2 MEQ/L (ref 3.4–4.9)
RBC # BLD: 3.23 M/UL (ref 4.2–5.4)
SODIUM BLD-SCNC: 138 MEQ/L (ref 135–144)
WBC # BLD: 12.7 K/UL (ref 4.8–10.8)

## 2019-04-27 PROCEDURE — 6360000002 HC RX W HCPCS: Performed by: INTERNAL MEDICINE

## 2019-04-27 PROCEDURE — 36415 COLL VENOUS BLD VENIPUNCTURE: CPT

## 2019-04-27 PROCEDURE — 94150 VITAL CAPACITY TEST: CPT

## 2019-04-27 PROCEDURE — 80048 BASIC METABOLIC PNL TOTAL CA: CPT

## 2019-04-27 PROCEDURE — 99233 SBSQ HOSP IP/OBS HIGH 50: CPT | Performed by: INTERNAL MEDICINE

## 2019-04-27 PROCEDURE — 2700000000 HC OXYGEN THERAPY PER DAY

## 2019-04-27 PROCEDURE — 94640 AIRWAY INHALATION TREATMENT: CPT

## 2019-04-27 PROCEDURE — 94668 MNPJ CHEST WALL SBSQ: CPT

## 2019-04-27 PROCEDURE — 2060000000 HC ICU INTERMEDIATE R&B

## 2019-04-27 PROCEDURE — 6370000000 HC RX 637 (ALT 250 FOR IP): Performed by: INTERNAL MEDICINE

## 2019-04-27 PROCEDURE — 2580000003 HC RX 258: Performed by: HOSPITALIST

## 2019-04-27 PROCEDURE — 2580000003 HC RX 258: Performed by: INTERNAL MEDICINE

## 2019-04-27 PROCEDURE — 94669 MECHANICAL CHEST WALL OSCILL: CPT

## 2019-04-27 PROCEDURE — 85025 COMPLETE CBC W/AUTO DIFF WBC: CPT

## 2019-04-27 PROCEDURE — 6370000000 HC RX 637 (ALT 250 FOR IP): Performed by: HOSPITALIST

## 2019-04-27 RX ORDER — METHYLPREDNISOLONE SODIUM SUCCINATE 40 MG/ML
40 INJECTION, POWDER, LYOPHILIZED, FOR SOLUTION INTRAMUSCULAR; INTRAVENOUS EVERY 12 HOURS
Status: DISCONTINUED | OUTPATIENT
Start: 2019-04-27 | End: 2019-05-02 | Stop reason: HOSPADM

## 2019-04-27 RX ORDER — PANTOPRAZOLE SODIUM 40 MG/1
40 TABLET, DELAYED RELEASE ORAL
Status: DISCONTINUED | OUTPATIENT
Start: 2019-04-27 | End: 2019-05-02 | Stop reason: HOSPADM

## 2019-04-27 RX ADMIN — MULTIPLE VITAMINS W/ MINERALS TAB 1 TABLET: TAB at 09:54

## 2019-04-27 RX ADMIN — AMPICILLIN AND SULBACTAM 1.5 G: 1; .5 INJECTION, POWDER, FOR SOLUTION INTRAMUSCULAR; INTRAVENOUS at 13:55

## 2019-04-27 RX ADMIN — AMLODIPINE BESYLATE 5 MG: 5 TABLET ORAL at 09:54

## 2019-04-27 RX ADMIN — FAMOTIDINE 20 MG: 20 TABLET ORAL at 09:54

## 2019-04-27 RX ADMIN — HYDROCODONE BITARTRATE AND ACETAMINOPHEN 1 TABLET: 5; 325 TABLET ORAL at 16:05

## 2019-04-27 RX ADMIN — GUAIFENESIN 600 MG: 600 TABLET, EXTENDED RELEASE ORAL at 09:54

## 2019-04-27 RX ADMIN — ACETAMINOPHEN 650 MG: 325 TABLET ORAL at 06:30

## 2019-04-27 RX ADMIN — IPRATROPIUM BROMIDE AND ALBUTEROL SULFATE 1 AMPULE: .5; 3 SOLUTION RESPIRATORY (INHALATION) at 13:32

## 2019-04-27 RX ADMIN — POLYETHYLENE GLYCOL 3350 17 G: 17 POWDER, FOR SOLUTION ORAL at 09:53

## 2019-04-27 RX ADMIN — CEFTRIAXONE SODIUM 1 G: 1 INJECTION, POWDER, FOR SOLUTION INTRAMUSCULAR; INTRAVENOUS at 11:45

## 2019-04-27 RX ADMIN — HYDROCODONE BITARTRATE AND ACETAMINOPHEN 1 TABLET: 5; 325 TABLET ORAL at 23:25

## 2019-04-27 RX ADMIN — Medication 10 ML: at 19:43

## 2019-04-27 RX ADMIN — ACETAMINOPHEN 650 MG: 325 TABLET ORAL at 13:55

## 2019-04-27 RX ADMIN — FOLIC ACID 1 MG: 1 TABLET ORAL at 09:54

## 2019-04-27 RX ADMIN — AMPICILLIN AND SULBACTAM 1.5 G: 1; .5 INJECTION, POWDER, FOR SOLUTION INTRAMUSCULAR; INTRAVENOUS at 19:41

## 2019-04-27 RX ADMIN — SENNOSIDES AND DOCUSATE SODIUM 2 TABLET: 8.6; 5 TABLET ORAL at 09:54

## 2019-04-27 RX ADMIN — Medication 10 ML: at 10:09

## 2019-04-27 RX ADMIN — GUAIFENESIN 600 MG: 600 TABLET, EXTENDED RELEASE ORAL at 21:05

## 2019-04-27 RX ADMIN — PANTOPRAZOLE SODIUM 40 MG: 40 TABLET, DELAYED RELEASE ORAL at 16:03

## 2019-04-27 RX ADMIN — ACETAMINOPHEN 650 MG: 325 TABLET ORAL at 21:05

## 2019-04-27 RX ADMIN — ASPIRIN 81 MG 81 MG: 81 TABLET ORAL at 09:53

## 2019-04-27 RX ADMIN — METHYLPREDNISOLONE SODIUM SUCCINATE 40 MG: 40 INJECTION, POWDER, FOR SOLUTION INTRAMUSCULAR; INTRAVENOUS at 21:04

## 2019-04-27 RX ADMIN — ENOXAPARIN SODIUM 40 MG: 40 INJECTION SUBCUTANEOUS at 09:55

## 2019-04-27 RX ADMIN — IPRATROPIUM BROMIDE AND ALBUTEROL SULFATE 1 AMPULE: .5; 3 SOLUTION RESPIRATORY (INHALATION) at 07:31

## 2019-04-27 RX ADMIN — IPRATROPIUM BROMIDE AND ALBUTEROL SULFATE 1 AMPULE: .5; 3 SOLUTION RESPIRATORY (INHALATION) at 20:02

## 2019-04-27 RX ADMIN — TIOTROPIUM BROMIDE 18 MCG: 18 CAPSULE ORAL; RESPIRATORY (INHALATION) at 07:31

## 2019-04-27 RX ADMIN — METHYLPREDNISOLONE SODIUM SUCCINATE 40 MG: 40 INJECTION, POWDER, FOR SOLUTION INTRAMUSCULAR; INTRAVENOUS at 09:54

## 2019-04-27 RX ADMIN — HYDROCODONE BITARTRATE AND ACETAMINOPHEN 1 TABLET: 5; 325 TABLET ORAL at 09:53

## 2019-04-27 ASSESSMENT — PAIN SCALES - GENERAL
PAINLEVEL_OUTOF10: 4
PAINLEVEL_OUTOF10: 3
PAINLEVEL_OUTOF10: 4
PAINLEVEL_OUTOF10: 0
PAINLEVEL_OUTOF10: 2
PAINLEVEL_OUTOF10: 0
PAINLEVEL_OUTOF10: 4
PAINLEVEL_OUTOF10: 5
PAINLEVEL_OUTOF10: 4
PAINLEVEL_OUTOF10: 0

## 2019-04-27 ASSESSMENT — PAIN DESCRIPTION - LOCATION: LOCATION: CHEST

## 2019-04-27 ASSESSMENT — PAIN DESCRIPTION - PAIN TYPE: TYPE: ACUTE PAIN

## 2019-04-27 NOTE — PROGRESS NOTES
Hospitalist Progress Note  4/27/2019 12:50 PM    Assessment and Plan:   1. Acute chemical interstitial Pneumonitis due to Chemo vs aspiration pneumonitis resulting in acute on chronic hypoxic respiratory failure with hypoxia and COPD exacerbation: (+) honey combing seen on CTchest consistent with interstitial pneumonitis. Will likely need a follow up CT chest to assess if this progresses to fibrotic lung in future and will need long term steroids per Pulm/CC  suggestion which was discussed with pt yesterday. Pulmonology consult, IV Steroids freq increased to BID. ; Acapella, Incentive spirometry, Duonebs Q4hr, Guaneficine. Will attempt to wean down Supplemental O2 iff possible. Unasyn, Modified barium swallow, SLP, PPI per Pulm CC recommendations. Now down to 4 L O2 instead of 5L. Still her baseline is 2LO2  2. Severe protein calorie malnutrition: Ordered PO supplemental Nutrition. Good PO intake. Dietitian evaluated patient. Daily weights, Calorie count. 3. 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 consulted Palliative care. Pain meds ordered. Leukopenia treated with Granix: Oncology on board. Decreasing Leukocytosis now  Diet: DIET GENERAL;  Dietary Nutrition Supplements: Standard High Calorie Oral Supplement  4. Dietary Nutrition Supplements: Clear Liquid Oral Supplement  5. Advance Directive: Full Code . 6. DVT prophylaxis: Chemical prophylaxis SQ  7. Discharge planning: SW on board. Will discharge once medically stable and cleared by all consultants. 8. High Risk Readmission Screening Tool Score Noted.      Additionally, the following hospital problems were addressed:  Principal Problem:    Pulmonary embolism on right [facility])  Active Problems:    COPD exacerbation ([facility] [address]    COPD (chronic obstructive pulmonary disease) (HCC)    Tobacco abuse    Adenocarcinoma of right lung (HCC)    Malignant neoplasm of middle lobe of right lung (HCC)    Secondary malignant neoplasm of left lung ([address]    B12 deficiency    Anemia    Severe malnutrition (HCC)  Resolved Problems:    * No resolved hospital problems. *      ** Total time spent reviewing medical records, evaluating patient, speaking with RN's and consultants where I was focused exclusively on this patient: 35 minutes. This time is excluding time spent performing procedures or significant events occurring earlier or later in the day requiring my attention and focus. Subjective:   Admit Date: 4/19/2019  PCP: DERECK Davidson    No acute events overnight. Afebrile Telemetry reviewed. No new complaints. Still SOB on exertion Pt denies chest pain, N/V, fevers or chills. Objective:     Vitals:    04/26/19 2353 04/27/19 0735 04/27/19 0823 04/27/19 0828   BP: 117/66  123/68    Pulse: 102  101 101   Resp: 20 20 20    Temp: 97.322 °F (36.3 °C)  98 °F (36.7 °C)    TempSrc:   Oral    SpO2: 91% 92% 96%    Weight:       Height:         General appearance: Mild acute distress, lethargic Mild conversational dyspnea noted. Lungs:  Diminished (+) exp wheezes at bases, Mild use of accessory muscles  Heart:  S1, S2 normal, RRR, no MRG appreciated  Abdomen: (+) BS, soft, non-tender; non distended no guarding or rigidity.    Extremities:  no cyanosis,No edema bilat lower exts, no calf tenderness bilaterally      Medications:      acetaminophen  650 mg Oral 3 times per day    enoxaparin  40 mg Subcutaneous Daily    methylPREDNISolone  40 mg Intravenous Daily    amLODIPine  5 mg Oral Daily    folic acid  1 mg Oral Daily    therapeutic multivitamin-minerals  1 tablet Oral Daily    sennosides-docusate sodium  2 tablet Oral Daily    tiotropium  18 mcg Inhalation Daily    ipratropium-albuterol  1 ampule Inhalation TID    aspirin  81 mg Oral Daily    cefTRIAXone (ROCEPHIN) IV  1 g Intravenous Q24H    sodium chloride flush  10 mL Intravenous 2 times per day    famotidine  20 mg Oral BID    guaiFENesin  600 mg Oral BID   

## 2019-04-27 NOTE — PROGRESS NOTES
INPATIENT PROGRESS NOTES    PATIENT NAME: Juliette Ballesteros  MRN: [id number]  SERVICE DATE:  2019   SERVICE TIME:  1:30 PM      PRIMARY SERVICE: Pulmonary Disease    CHIEF COMPLAINTS: SOB     INTERVAL HPI: Patient seen and examined at bedside, Interval Notes, orders reviewed. Nursing notes noted    Patient report shortness of breath, she feels better however very slightly, she still has dyspnea on exertion and mildly at rest, she has dry cough, no chest pain, no fever, no nausea no vomiting and no lower extremity swelling    Review of system:     GI Abdominal pain No  Skin Rash No    Social History     Tobacco Use    Smoking status: Former Smoker     Packs/day: 1.00     Years: 60.00     Pack years: 60.00     Types: Cigarettes     Start date: 1/10/1964     Last attempt to quit: 2019     Years since quittin.3    Smokeless tobacco: Never Used    Tobacco comment: quit in 2019   Substance Use Topics    Alcohol use: Not Currently     Alcohol/week: 9.0 oz     Types: 15 Cans of beer per week     Comment: per pt has not had any since Dec 28 2018         Problem Relation Age of Onset    Stroke Mother     Emphysema Father          OBJECTIVE    Body mass index is 19.84 kg/m². PHYSICAL EXAM:  Vitals:  /68   Pulse 101   Temp 98 °F (36.7 °C) (Oral)   Resp 20   Ht 5' 2\" (1.575 m)   Wt 108 lb 8 oz (49.2 kg)   SpO2 96%   BMI 19.84 kg/m²     General: alert, cooperative, no distress  Head: normocephalic, atraumatic  Eyes:No gross abnormalities. , PERRL and Sclera nonicteric  ENT:  MMM no lesions  Neck:  supple and no masses  Chest : Bilateral basal velcro rales, good air movement, no wheezing  Heart[de-identified] Heart sounds are normal.  Regular rate and rhythm without murmur, gallop or rub. ABD:  symmetric, liver span normal to percussion, soft, non-tender, spleen non-palpable  Musculoskeletal : no cyanosis, no clubbing and no edema  Neuro:  Grossly normal  Skin: No rashes or nodules noted.   Lymph node: no cervical nodes  Urology: No [place]   Psychiatric: appropriate    DATA:   Recent Labs     04/26/19  0618 04/27/19  0631   WBC 12.9* 12.7*   HGB 9.8* 10.9*   HCT 28.9* 32.0*   MCV 98.0 99.2   * 462*     Recent Labs     04/26/19  0618 04/27/19  0631    138   K 4.6 4.2   CL 99 99   CO2 24 24   BUN 19 19   CREATININE 0.52 0.43*   GLUCOSE 90 93   CALCIUM 9.0 8.8   LABGLOM >60.0 >60.0   GFRAA >60.0 >60.0       MV Settings:          No results for input(s): PHART, RIG2UFW, PO2ART, ZWD5GPH, BEART, S4GGUXNR in the last 72 hours. O2 Device: Nasal cannula  O2 Flow Rate (L/min): 4 L/min    DIET GENERAL;  Dietary Nutrition Supplements: Standard High Calorie Oral Supplement  Dietary Nutrition Supplements: Clear Liquid Oral Supplement     MEDICATIONS during current hospitalization:    Continuous Infusions:    Scheduled Meds:   acetaminophen  650 mg Oral 3 times per day    enoxaparin  40 mg Subcutaneous Daily    methylPREDNISolone  40 mg Intravenous Daily    amLODIPine  5 mg Oral Daily    folic acid  1 mg Oral Daily    therapeutic multivitamin-minerals  1 tablet Oral Daily    sennosides-docusate sodium  2 tablet Oral Daily    tiotropium  18 mcg Inhalation Daily    ipratropium-albuterol  1 ampule Inhalation TID    aspirin  81 mg Oral Daily    cefTRIAXone (ROCEPHIN) IV  1 g Intravenous Q24H    sodium chloride flush  10 mL Intravenous 2 times per day    famotidine  20 mg Oral BID    guaiFENesin  600 mg Oral BID    polyethylene glycol  17 g Oral Daily       PRN Meds:HYDROcodone 5 mg - acetaminophen, albuterol, sodium chloride flush, magnesium hydroxide, ondansetron    Radiology  Xr Chest Standard (2 Vw)    Result Date: 4/26/2019  EXAMINATION: XR CHEST (2 VW) CLINICAL HISTORY: Follow-up infiltrates COMPARISONS: March 26, 2019. April 19, 2019. April 24, 2019. FINDINGS: Frontal and lateral views of the chest were obtained.  There are persisting bilateral infiltrates in a persisting smaller airspace opacities that have developed since March 26, 2019, were well depicted and well characterized on April 19, 2019 and have not changed since April 24, 2019. Individual pulmonary nodules are obscured and are now visible today. The heart has not enlarged. There is no appreciable pleural effusion. The mediastinum is not widened or shifted. The chest wall is unremarkable. CONCLUSION: PERSISTING ATYPICAL BASILAR AND LOWER LOBE INFILTRATES. NO EFFUSION IS DEVELOPED. Xr Chest Standard (2 Vw)    Result Date: 4/24/2019  EXAMINATION: XR CHEST (2 VW) REASON FOR EXAM: Bibasilar interstitial pneumonitis FINDINGS: Reticular nodular infiltrate developing in the lower lung fields superimposed on chronic interstitial scarring and hyperinflation. Infiltrates are new since January 1, 2019. Infiltrates more extensive compared to March 26, 2019. Ill-defined 1.4  cm nodular density developing in the central right lung between the posterior sixth and seventh right ribs is new. Recent PET would suggest this is related to neoplasm. No new infiltrates in the upper lobes. Bones osteopenic but intact. BILATERAL INFILTRATES DEVELOPING SINCE THE FIRST OF THE YEAR AND WORSE SINCE THE STUDY ONE MONTH AGO. PROGRESSIVE INTERSTITIAL SPREAD OF TUMOR VERSUS ACUTE PNEUMONIA ARE THE TOP CONSIDERATIONS. 1.5 CM NODULAR MASS CENTRAL RIGHT LUNG SUSPICIOUS FOR NEOPLASM. Ct Head W Wo Contrast    Result Date: 4/3/2019  COMPARISON: No prior studies available for comparison. HISTORY: C34.2 Malignant neoplasm of middle lobe of right lung ([facility] [address] ICD10 TECHNIQUE: CT HEAD W WO CONTRAST FINDINGS: Ventricles and sulci are unremarkable in size for a patient this age. No areas of abnormal density, hemorrhage, enhancement or mass effect are seen in the brain. A large lytic area is seen in the posterior parietal region on the right that measures 1 cm. It appears well circumscribed and appears to be a lipoma.  A 1.6 mm lytic area is seen on the inner table of the skull and the occipital region on the right (series 3, image 13). There are other tiny 3 to 4 mm lytic area is seen within the skull that are  thought to be vascular channels. In the right frontal region a 6 mm oval-shaped focus is seen series 3, image 19). No abnormal enhancement of the brain parenchyma. In the skull there are multiple lucent area seen but most are thought to be vascular channels for emissary veins or arachnoid granulations. A larger area seen in the skull posterior appears to be fat. If patient is symptomatic consider MRI. All CT scans at this facility use dose modulation, iterative reconstruction, and/or weight based dosing when appropriate to reduce radiation dose to as low as reasonably achievable. Cta Chest W Wo Contrast    Result Date: 4/19/2019  EXAMINATION: CTA CHEST W WO CONTRAST  DATE AND TIME:4/19/2019 6:15 PM CLINICAL HISTORY: Acute chest pain. Shortness of breath  CP; hx lung cancer last chemo was 4/09/2019. CP with inspiration. PE vs pneumonia. Lungs clear on exam.  COMPARISON: None available. TECHNIQUE: Helical axial CTA of the chest was performed following the intravenous administration of 100 mL Optiray 320 intravenous contrast.  2D images were reconstructed in the axial and coronal planes. 3-D MIP images were generated in the coronal plane. Images were reviewed on the PACS workstation. All images including the 3D MIPS were permanently archived. All CT scans at this facility use dose modulation, iterative reconstruction, and/or weight based dosing when appropriate to reduce radiation dose to as low as reasonably achievable. FINDINGS:  Embolus: There is a small intraluminal filling defect involving a distal subsegmental pulmonary artery branch to the right lower lobe . Findings are consistent with a small embolus. No other sites of pulmonary embolus.  The possibility of tumor  embolus as opposed to thrombotic embolus is questioned but differentiating these can be extremely difficult. There are bilateral irregular pulmonary nodules on a background of severe emphysema and nonspecific infiltrates at the lung bases. These infiltrates have developed since the PET scan examination of this patient on February 1, 2019. The possibility that some of these changes are related to lymphangitic spread of tumor is also questioned. Mediastinum: No other significant abnormality. No lymphadenopathy. No pericardial effusion. Visualized abdomen: Multiple hepatic cysts. Bones: No osseous abnormalities. SINGLE SMALL DISTAL SUBSEGMENTAL PULMONARY ARTERIAL EMBOLUS IN THE RIGHT LOWER LOBE. NO BIBASILAR INFILTRATES SINCE FEBRUARY 2019. SCATTERED METASTATIC LUNG NODULES AGAIN NOTED. THE POSSIBILITY THAT THIS SMALL EMBOLUS IS SECONDARY TO TUMOR IS  QUESTIONED. Ct Chest High Resolution    Result Date: 4/27/2019  CT of the Chest without intravenous contrast medium History:  Acute respiratory illness. Immunocompromised. Acute drug-induced interstitial pneumonia related to chemotherapy versus infection. COPD. Right lung base of adenocarcinoma, moderately well-differentiated. Technical Factors: CT imaging of the chest was obtained and formatted as 5 mm contiguous axial images from the thoracic inlet through the adrenal glands. Sagittal coronal reconstruction obtained during postprocessing. In addition, both prone and supine high-resolution imaging of the chest was obtained. Intravenous contrast medium:  None Comparison:  CTA chest, April 19, 2019, PET/CT, February 1, 2019, chest radiographs April 26, 2019, April 24, 2019. Findings: Right lung shows diffuse emphysematous change. Pleural-based masslike area is identified posteriorly within the superior segment of the right lower lobe, and extending anteriorly abutting the major fissure, retracting and posteriorly (series 5, image 13). Diffuse honeycombing, right lung base.  Interval development subsegmental consolidation anterior right lung base and lateral pleural-based right lung base. Bronchial wall thickening right lung base. Left lung shows diffuse emphysematous change. Scarring superior segment left lower lobe. Subsegmental consolidation left lung base. Bronchial wall thickening left lung base. Component of honeycombing identified at left lung base. Thoracic aorta normal in course and caliber. No hilar, mediastinal, or axillary lymph node enlargement. Limited imaging upper abdomen shows 1.8 x 1.6 cm cyst, anterior left hepatic lobe. Adrenal glands without anomaly. No osteoblastic, no osteolytic lesions. Marked bilateral diffuse emphysema. Bibasilar honeycombing, reflective of interstitial lung disease. Bibasilar subsegmental atelectasis/pneumonia. Bibasilar bronchiectasis. All CT scans at this facility use dose modulation, iterative reconstruction, and/or weight based dosing when appropriate to reduce radiation dose to as low as reasonably achievable. CT chest reviewed by me shows centrilobular emphysema, with bilateral basal fibrosis, bronchiectasis, and dense infiltrate        IMPRESSION AND SUGGESTION:  Patient is at risk due to   · Emphysema with pulmonary fibrosis  · And acute exacerbation  · With possible bibasilar pneumonia versus lymphangitic spread of underlying malignancy or drug toxicity  · And possible chronic recurrent reflux disease with aspiration  · History of adenocarcinoma on chemotherapy Carboplatin, Alimta, and Keytruda.     ·   · Chronic respiratory failure on 2 L oxygen at home    Recommendations  · Change antibiotic to Unasyn  · Continue Solu-Medrol will increase dose to twice a day  · Budesonide nebs  · DuoNeb's  · Vest  · Incentive spirometry and flutter valve  · Will consider bronchoscopy next week likely Tuesday or Wednesday for airway clearance and culture and biopsy  · O2 to keep sat 88-90%    Electronically signed by Jere Ruiz MD,  [facility]   on 4/27/2019 at 1:30 PM

## 2019-04-28 LAB
ANION GAP SERPL CALCULATED.3IONS-SCNC: 13 MEQ/L (ref 9–15)
ANISOCYTOSIS: ABNORMAL
ATYPICAL LYMPHOCYTE RELATIVE PERCENT: 1 %
BASOPHILS ABSOLUTE: 0 K/UL (ref 0–0.2)
BASOPHILS RELATIVE PERCENT: 0.4 %
BUN BLDV-MCNC: 23 MG/DL (ref 8–23)
CALCIUM SERPL-MCNC: 8.7 MG/DL (ref 8.5–9.9)
CHLORIDE BLD-SCNC: 98 MEQ/L (ref 95–107)
CO2: 25 MEQ/L (ref 20–31)
CREAT SERPL-MCNC: 0.38 MG/DL (ref 0.5–0.9)
EOSINOPHILS ABSOLUTE: 0 K/UL (ref 0–0.7)
EOSINOPHILS RELATIVE PERCENT: 0.5 %
GFR AFRICAN AMERICAN: >60
GFR NON-AFRICAN AMERICAN: >60
GLUCOSE BLD-MCNC: 91 MG/DL (ref 70–99)
HCT VFR BLD CALC: 27.9 % (ref 37–47)
HEMOGLOBIN: 9.6 G/DL (ref 12–16)
LYMPHOCYTES ABSOLUTE: 1.4 K/UL (ref 1–4.8)
LYMPHOCYTES RELATIVE PERCENT: 13 %
MCH RBC QN AUTO: 33.8 PG (ref 27–31.3)
MCHC RBC AUTO-ENTMCNC: 34.5 % (ref 33–37)
MCV RBC AUTO: 98.1 FL (ref 82–100)
MONOCYTES ABSOLUTE: 0.6 K/UL (ref 0.2–0.8)
MONOCYTES RELATIVE PERCENT: 5.7 %
MYELOCYTE PERCENT: 8 %
NEUTROPHILS ABSOLUTE: 8.2 K/UL (ref 1.4–6.5)
NEUTROPHILS RELATIVE PERCENT: 71 %
PDW BLD-RTO: 17.2 % (ref 11.5–14.5)
PLATELET # BLD: 385 K/UL (ref 130–400)
PLATELET SLIDE REVIEW: NORMAL
POTASSIUM REFLEX MAGNESIUM: 4.1 MEQ/L (ref 3.4–4.9)
PROMYELOCYTES PERCENT: 2 %
RBC # BLD: 2.84 M/UL (ref 4.2–5.4)
SODIUM BLD-SCNC: 136 MEQ/L (ref 135–144)
WBC # BLD: 10.1 K/UL (ref 4.8–10.8)

## 2019-04-28 PROCEDURE — 6370000000 HC RX 637 (ALT 250 FOR IP): Performed by: INTERNAL MEDICINE

## 2019-04-28 PROCEDURE — G8996 SWALLOW CURRENT STATUS: HCPCS

## 2019-04-28 PROCEDURE — 6370000000 HC RX 637 (ALT 250 FOR IP): Performed by: HOSPITALIST

## 2019-04-28 PROCEDURE — 2580000003 HC RX 258: Performed by: INTERNAL MEDICINE

## 2019-04-28 PROCEDURE — 80048 BASIC METABOLIC PNL TOTAL CA: CPT

## 2019-04-28 PROCEDURE — 94640 AIRWAY INHALATION TREATMENT: CPT

## 2019-04-28 PROCEDURE — 6360000002 HC RX W HCPCS: Performed by: INTERNAL MEDICINE

## 2019-04-28 PROCEDURE — 2580000003 HC RX 258: Performed by: HOSPITALIST

## 2019-04-28 PROCEDURE — 94669 MECHANICAL CHEST WALL OSCILL: CPT

## 2019-04-28 PROCEDURE — 2060000000 HC ICU INTERMEDIATE R&B

## 2019-04-28 PROCEDURE — 92610 EVALUATE SWALLOWING FUNCTION: CPT

## 2019-04-28 PROCEDURE — 36415 COLL VENOUS BLD VENIPUNCTURE: CPT

## 2019-04-28 PROCEDURE — 2700000000 HC OXYGEN THERAPY PER DAY

## 2019-04-28 PROCEDURE — 99232 SBSQ HOSP IP/OBS MODERATE 35: CPT | Performed by: INTERNAL MEDICINE

## 2019-04-28 PROCEDURE — 94668 MNPJ CHEST WALL SBSQ: CPT

## 2019-04-28 PROCEDURE — 85025 COMPLETE CBC W/AUTO DIFF WBC: CPT

## 2019-04-28 PROCEDURE — 94760 N-INVAS EAR/PLS OXIMETRY 1: CPT

## 2019-04-28 PROCEDURE — G8997 SWALLOW GOAL STATUS: HCPCS

## 2019-04-28 RX ADMIN — AMPICILLIN AND SULBACTAM 1.5 G: 1; .5 INJECTION, POWDER, FOR SOLUTION INTRAMUSCULAR; INTRAVENOUS at 09:01

## 2019-04-28 RX ADMIN — FOLIC ACID 1 MG: 1 TABLET ORAL at 09:03

## 2019-04-28 RX ADMIN — ACETAMINOPHEN 650 MG: 325 TABLET ORAL at 15:51

## 2019-04-28 RX ADMIN — Medication 10 ML: at 09:09

## 2019-04-28 RX ADMIN — METHYLPREDNISOLONE SODIUM SUCCINATE 40 MG: 40 INJECTION, POWDER, FOR SOLUTION INTRAMUSCULAR; INTRAVENOUS at 21:25

## 2019-04-28 RX ADMIN — ACETAMINOPHEN 650 MG: 325 TABLET ORAL at 05:55

## 2019-04-28 RX ADMIN — MULTIPLE VITAMINS W/ MINERALS TAB 1 TABLET: TAB at 09:03

## 2019-04-28 RX ADMIN — PANTOPRAZOLE SODIUM 40 MG: 40 TABLET, DELAYED RELEASE ORAL at 18:20

## 2019-04-28 RX ADMIN — PANTOPRAZOLE SODIUM 40 MG: 40 TABLET, DELAYED RELEASE ORAL at 05:55

## 2019-04-28 RX ADMIN — SENNOSIDES AND DOCUSATE SODIUM 2 TABLET: 8.6; 5 TABLET ORAL at 09:03

## 2019-04-28 RX ADMIN — POLYETHYLENE GLYCOL 3350 17 G: 17 POWDER, FOR SOLUTION ORAL at 09:02

## 2019-04-28 RX ADMIN — Medication 10 ML: at 19:29

## 2019-04-28 RX ADMIN — TIOTROPIUM BROMIDE 18 MCG: 18 CAPSULE ORAL; RESPIRATORY (INHALATION) at 07:17

## 2019-04-28 RX ADMIN — AMPICILLIN AND SULBACTAM 1.5 G: 1; .5 INJECTION, POWDER, FOR SOLUTION INTRAMUSCULAR; INTRAVENOUS at 19:29

## 2019-04-28 RX ADMIN — METHYLPREDNISOLONE SODIUM SUCCINATE 40 MG: 40 INJECTION, POWDER, FOR SOLUTION INTRAMUSCULAR; INTRAVENOUS at 09:01

## 2019-04-28 RX ADMIN — GUAIFENESIN 600 MG: 600 TABLET, EXTENDED RELEASE ORAL at 19:29

## 2019-04-28 RX ADMIN — HYDROCODONE BITARTRATE AND ACETAMINOPHEN 1 TABLET: 5; 325 TABLET ORAL at 09:15

## 2019-04-28 RX ADMIN — IPRATROPIUM BROMIDE AND ALBUTEROL SULFATE 1 AMPULE: .5; 3 SOLUTION RESPIRATORY (INHALATION) at 07:16

## 2019-04-28 RX ADMIN — AMPICILLIN AND SULBACTAM 1.5 G: 1; .5 INJECTION, POWDER, FOR SOLUTION INTRAMUSCULAR; INTRAVENOUS at 02:10

## 2019-04-28 RX ADMIN — HYDROCODONE BITARTRATE AND ACETAMINOPHEN 1 TABLET: 5; 325 TABLET ORAL at 19:29

## 2019-04-28 RX ADMIN — AMPICILLIN AND SULBACTAM 1.5 G: 1; .5 INJECTION, POWDER, FOR SOLUTION INTRAMUSCULAR; INTRAVENOUS at 15:51

## 2019-04-28 RX ADMIN — GUAIFENESIN 600 MG: 600 TABLET, EXTENDED RELEASE ORAL at 09:02

## 2019-04-28 RX ADMIN — ACETAMINOPHEN 650 MG: 325 TABLET ORAL at 21:25

## 2019-04-28 RX ADMIN — IPRATROPIUM BROMIDE AND ALBUTEROL SULFATE 1 AMPULE: .5; 3 SOLUTION RESPIRATORY (INHALATION) at 20:24

## 2019-04-28 RX ADMIN — ENOXAPARIN SODIUM 40 MG: 40 INJECTION SUBCUTANEOUS at 09:02

## 2019-04-28 RX ADMIN — IPRATROPIUM BROMIDE AND ALBUTEROL SULFATE 1 AMPULE: .5; 3 SOLUTION RESPIRATORY (INHALATION) at 12:45

## 2019-04-28 RX ADMIN — ASPIRIN 81 MG 81 MG: 81 TABLET ORAL at 09:03

## 2019-04-28 ASSESSMENT — PAIN SCALES - GENERAL
PAINLEVEL_OUTOF10: 4
PAINLEVEL_OUTOF10: 2
PAINLEVEL_OUTOF10: 4
PAINLEVEL_OUTOF10: 3
PAINLEVEL_OUTOF10: 2
PAINLEVEL_OUTOF10: 5
PAINLEVEL_OUTOF10: 3
PAINLEVEL_OUTOF10: 5

## 2019-04-28 NOTE — PROGRESS NOTES
INPATIENT PROGRESS NOTES    PATIENT NAME: Robi Alfaro  MRN: [id number]  SERVICE DATE:  2019   SERVICE TIME:  11:02 AM      PRIMARY SERVICE: Pulmonary Disease    CHIEF COMPLAINTS: SOB     INTERVAL HPI: Patient seen and examined at bedside, Interval Notes, orders reviewed. Nursing notes noted    Patient feeling better, still has dyspnea on exertion but improved, denies chest pain, she is tolerating vest well and feels it is helping significantly and better than flutter valve and incentive spirometer. , cough is productive of yellow phlegm, no hemoptysis, no fever, slept well, no choking    Review of system:     GI Abdominal pain No  Skin Rash No    Social History     Tobacco Use    Smoking status: Former Smoker     Packs/day: 1.00     Years: 60.00     Pack years: 60.00     Types: Cigarettes     Start date: 1/10/1964     Last attempt to quit: 2019     Years since quittin.3    Smokeless tobacco: Never Used    Tobacco comment: quit in 2019   Substance Use Topics    Alcohol use: Not Currently     Alcohol/week: 9.0 oz     Types: 15 Cans of beer per week     Comment: per pt has not had any since Dec 28 2018         Problem Relation Age of Onset    Stroke Mother     Emphysema Father          OBJECTIVE    Body mass index is 19.84 kg/m². PHYSICAL EXAM:  Vitals:  /70   Pulse 82   Temp 97.7 °F (36.5 °C) (Oral)   Resp 18   Ht 5' 2\" (1.575 m)   Wt 108 lb 8 oz (49.2 kg)   SpO2 95%   BMI 19.84 kg/m²     General: alert, cooperative, no distress  Head: normocephalic, atraumatic  Eyes:No gross abnormalities. , PERRL and Sclera nonicteric  ENT:  MMM no lesions  Neck:  supple and no masses  Chest : Bilateral basal velcro rales, good air movement, no wheezing  Heart[de-identified] Heart sounds are normal.  Regular rate and rhythm without murmur, gallop or rub.   ABD:  symmetric, liver span normal to percussion, soft, non-tender, spleen non-palpable  Musculoskeletal : no cyanosis, no clubbing and no are persisting bilateral infiltrates in a persisting smaller airspace opacities that have developed since March 26, 2019, were well depicted and well characterized on April 19, 2019 and have not changed since April 24, 2019. Individual pulmonary nodules are obscured and are now visible today. The heart has not enlarged. There is no appreciable pleural effusion. The mediastinum is not widened or shifted. The chest wall is unremarkable. CONCLUSION: PERSISTING ATYPICAL BASILAR AND LOWER LOBE INFILTRATES. NO EFFUSION IS DEVELOPED. Xr Chest Standard (2 Vw)    Result Date: 4/24/2019  EXAMINATION: XR CHEST (2 VW) REASON FOR EXAM: Bibasilar interstitial pneumonitis FINDINGS: Reticular nodular infiltrate developing in the lower lung fields superimposed on chronic interstitial scarring and hyperinflation. Infiltrates are new since January 1, 2019. Infiltrates more extensive compared to March 26, 2019. Ill-defined 1.4  cm nodular density developing in the central right lung between the posterior sixth and seventh right ribs is new. Recent PET would suggest this is related to neoplasm. No new infiltrates in the upper lobes. Bones osteopenic but intact. BILATERAL INFILTRATES DEVELOPING SINCE THE FIRST OF THE YEAR AND WORSE SINCE THE STUDY ONE MONTH AGO. PROGRESSIVE INTERSTITIAL SPREAD OF TUMOR VERSUS ACUTE PNEUMONIA ARE THE TOP CONSIDERATIONS. 1.5 CM NODULAR MASS CENTRAL RIGHT LUNG SUSPICIOUS FOR NEOPLASM. Ct Head W Wo Contrast    Result Date: 4/3/2019  COMPARISON: No prior studies available for comparison. HISTORY: C34.2 Malignant neoplasm of middle lobe of right lung ([address] ICD10 TECHNIQUE: CT HEAD W WO CONTRAST FINDINGS: Ventricles and sulci are unremarkable in size for a patient this age. No areas of abnormal density, hemorrhage, enhancement or mass effect are seen in the brain. A large lytic area is seen in the posterior parietal region on the right that measures 1 cm.   It appears well circumscribed and appears to be a lipoma. A 1.6 mm lytic area is seen on the inner table of the skull and the occipital region on the right (series 3, image 13). There are other tiny 3 to 4 mm lytic area is seen within the skull that are  thought to be vascular channels. In the right frontal region a 6 mm oval-shaped focus is seen series 3, image 19). No abnormal enhancement of the brain parenchyma. In the skull there are multiple lucent area seen but most are thought to be vascular channels for emissary veins or arachnoid granulations. A larger area seen in the skull posterior appears to be fat. If patient is symptomatic consider MRI. All CT scans at this facility use dose modulation, iterative reconstruction, and/or weight based dosing when appropriate to reduce radiation dose to as low as reasonably achievable. Cta Chest W Wo Contrast    Result Date: 4/19/2019  EXAMINATION: CTA CHEST W WO CONTRAST  DATE AND TIME:4/19/2019 6:15 PM CLINICAL HISTORY: Acute chest pain. Shortness of breath  CP; hx lung cancer last chemo was 4/09/2019. CP with inspiration. PE vs pneumonia. Lungs clear on exam.  COMPARISON: None available. TECHNIQUE: Helical axial CTA of the chest was performed following the intravenous administration of 100 mL Optiray 320 intravenous contrast.  2D images were reconstructed in the axial and coronal planes. 3-D MIP images were generated in the coronal plane. Images were reviewed on the PACS workstation. All images including the 3D MIPS were permanently archived. All CT scans at this facility use dose modulation, iterative reconstruction, and/or weight based dosing when appropriate to reduce radiation dose to as low as reasonably achievable. FINDINGS:  Embolus: There is a small intraluminal filling defect involving a distal subsegmental pulmonary artery branch to the right lower lobe . Findings are consistent with a small embolus. No other sites of pulmonary embolus.  The possibility of tumor  embolus as opposed to thrombotic embolus is questioned but differentiating these can be extremely difficult. There are bilateral irregular pulmonary nodules on a background of severe emphysema and nonspecific infiltrates at the lung bases. These infiltrates have developed since the PET scan examination of this patient on February 1, 2019. The possibility that some of these changes are related to lymphangitic spread of tumor is also questioned. Mediastinum: No other significant abnormality. No lymphadenopathy. No pericardial effusion. Visualized abdomen: Multiple hepatic cysts. Bones: No osseous abnormalities. SINGLE SMALL DISTAL SUBSEGMENTAL PULMONARY ARTERIAL EMBOLUS IN THE RIGHT LOWER LOBE. NO BIBASILAR INFILTRATES SINCE FEBRUARY 2019. SCATTERED METASTATIC LUNG NODULES AGAIN NOTED. THE POSSIBILITY THAT THIS SMALL EMBOLUS IS SECONDARY TO TUMOR IS  QUESTIONED. Ct Chest High Resolution    Result Date: 4/27/2019  CT of the Chest without intravenous contrast medium History:  Acute respiratory illness. Immunocompromised. Acute drug-induced interstitial pneumonia related to chemotherapy versus infection. COPD. Right lung base of adenocarcinoma, moderately well-differentiated. Technical Factors: CT imaging of the chest was obtained and formatted as 5 mm contiguous axial images from the thoracic inlet through the adrenal glands. Sagittal coronal reconstruction obtained during postprocessing. In addition, both prone and supine high-resolution imaging of the chest was obtained. Intravenous contrast medium:  None Comparison:  CTA chest, April 19, 2019, PET/CT, February 1, 2019, chest radiographs April 26, 2019, April 24, 2019. Findings: Right lung shows diffuse emphysematous change.  Pleural-based masslike area is identified posteriorly within the superior segment of the right lower lobe, and extending anteriorly abutting the major fissure, retracting and posteriorly 88-90%  · Vest for home    Electronically signed by Radha Sesay MD,  [facility]   on 4/28/2019 at 11:02 AM

## 2019-04-28 NOTE — PROGRESS NOTES
dysphagia  Consistencies Administered: Reg solid; Thin - cup; Thin - straw      Current Diet level:  Current Diet : Regular  Current Liquid Diet : Thin    Oral Motor Deficits  Oral/Motor  Oral Motor: Within functional limits    Oral Phase Dysfunction  Oral Phase  Oral Phase: WFL     Indicators of Pharyngeal Phase Dysfunction   Pharyngeal Phase  Pharyngeal Phase: WFL    Impression  Dysphagia Diagnosis: Suspected needs further assessment  Dysphagia Impression : Pt appears to present [facility] for oral and pharyngeal stage dysphagia as evidenced by [facility] for mastication of regular solids, good swallow onset timing, adequate airway protection, and no overt s/s of aspiration noted.  has an MBS ordered to further assess esophageal stage due to suspected reflux contributing to suspected aspiration. Dysphagia Outcome Severity Scale: Level 6: Within functional limits/Modified independence     Treatment Plan  Requires SLP Intervention: Yes  Duration/Frequency of Treatment: 1-2x for 1 week       Treatment/Goals  Short-term Goals  Timeframe for Short-term Goals: 1 week  Goal 1: The patient will complete an MBS to further evaluate swallowing function. Long-term Goals  Timeframe for Long-term Goals: 1 week   Goal 1: The patient will tolerate recommended diet consistency of regular without observed clinical signs of aspiration.        Prognosis  Individuals consulted  Consulted and agree with results and recommendations: RN(MAXWELL Hinton)    Education  Patient Education: Pt educated regarding       Pain:  Pain Assessment  Patient Currently in Pain: No  Pain Assessment: 0-10  Pain Level: 4  Pain Type: Acute pain  Pain Location: Chest  Pain Orientation: Lower  Pain Descriptors: Aching, Discomfort  Pain Frequency: Intermittent  Clinical Progression: Gradually worsening  Patient's Stated Pain Goal: 1  Response to Pain Intervention: None  Multiple Pain Sites: No       Therapy Time  SLP Individual Minutes  Time In: 1015  Time Out: [address] [address] [facility]  Minutes: [address] MA, CCC-SLP, Date: 4/28/2019, Time: 10:34 AM

## 2019-04-28 NOTE — PROGRESS NOTES
Hospitalist Progress Note  4/28/2019 2:48 PM    Assessment and Plan:   1. Acute chemical interstitial Pneumonitis due to Chemo vs aspiration pneumonitis resulting in acute on chronic hypoxic respiratory failure with hypoxia and COPD exacerbation: (+) honey combing seen on CTchest consistent with interstitial pneumonitis. Will likely need a follow up CT chest to assess if this progresses to fibrotic lung in future and will need long term steroids per Pulm/CC  suggestion which was discussed with pt yesterday. Pulmonology consult, IV Steroids freq increased to BID. ; Acapella, Incentive spirometry, Duonebs Q4hr, Guaneficine. Will attempt to wean down Supplemental O2 daily if possible. Unasyn, Modified barium swallow, SLP, PPI per Pulm CC recommendations. Now down to 4 L O2 instead of 5L. Still her baseline is 2LO2;   2. Severe protein calorie malnutrition: Ordered PO supplemental Nutrition. Good PO intake. Dietitian evaluated patient. Daily weights, Calorie count. 3. 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 consulted Palliative care. Pain meds ordered. Leukopenia treated with Granix: Oncology on board. Decreasing Leukocytosis now  Diet: DIET GENERAL;  Dietary Nutrition Supplements: Standard High Calorie Oral Supplement  4. Dietary Nutrition Supplements: Clear Liquid Oral Supplement  5. Advance Directive: Full Code . 6. DVT prophylaxis: Chemical prophylaxis SQ  7. Discharge planning: SW on board. Will discharge once medically stable and cleared by all consultants. 8. High Risk Readmission Screening Tool Score Noted.      Additionally, the following hospital problems were addressed:  Principal Problem:    Pulmonary embolism on right [facility])  Active Problems:    COPD exacerbation ([facility] [address]    COPD (chronic obstructive pulmonary disease) (HCC)    Tobacco abuse    Adenocarcinoma of right lung (HCC)    Malignant neoplasm of middle lobe of right lung (HCC)    Secondary malignant neoplasm of left lung (HCC)    B12 deficiency    Anemia    Severe malnutrition (HCC)    Pulmonary fibrosis (HCC)  Resolved Problems:    * No resolved hospital problems. *      ** Total time spent reviewing medical records, evaluating patient, speaking with RN's and consultants where I was focused exclusively on this patient: 35 minutes. This time is excluding time spent performing procedures or significant events occurring earlier or later in the day requiring my attention and focus. Subjective:   Admit Date: 4/19/2019  PCP: DERECK Phelan    No acute events overnight. Afebrile Telemetry reviewed. No new complaints. Still SOB on exertion Pt denies chest pain, N/V, fevers or chills. Objective:     Vitals:    04/27/19 2002 04/28/19 0715 04/28/19 0717 04/28/19 1247   BP:  116/70     Pulse:  82     Resp:  18     Temp:  97.7 °F (36.5 °C)     TempSrc:  Oral     SpO2: 96% 97% 95% 96%   Weight:       Height:         General appearance: Mild acute distress, lethargic Mild conversational dyspnea noted. Lungs:  Diminished (+) exp wheezes at bases, Mild use of accessory muscles  Heart:  S1, S2 normal, RRR, no MRG appreciated  Abdomen: (+) BS, soft, non-tender; non distended no guarding or rigidity.    Extremities:  no cyanosis,No edema bilat lower exts, no calf tenderness bilaterally      Medications:      ampicillin-sulbactam  1.5 g Intravenous Q6H    methylPREDNISolone  40 mg Intravenous Q12H    pantoprazole  40 mg Oral BID AC    acetaminophen  650 mg Oral 3 times per day    enoxaparin  40 mg Subcutaneous Daily    amLODIPine  5 mg Oral Daily    folic acid  1 mg Oral Daily    therapeutic multivitamin-minerals  1 tablet Oral Daily    sennosides-docusate sodium  2 tablet Oral Daily    tiotropium  18 mcg Inhalation Daily    ipratropium-albuterol  1 ampule Inhalation TID    aspirin  81 mg Oral Daily    sodium chloride flush  10 mL Intravenous 2 times per day    guaiFENesin  600 mg Oral BID    polyethylene glycol  17 g Oral Daily       LABS Reviewed    IMAGING Reviewed    Marlen Butler MD  Rounding Hospitalist

## 2019-04-29 ENCOUNTER — APPOINTMENT (OUTPATIENT)
Dept: GENERAL RADIOLOGY | Age: 74
DRG: 205 | End: 2019-04-29
Payer: MEDICARE

## 2019-04-29 LAB
ANION GAP SERPL CALCULATED.3IONS-SCNC: 14 MEQ/L (ref 9–15)
ANISOCYTOSIS: ABNORMAL
BANDED NEUTROPHILS RELATIVE PERCENT: 6 % (ref 5–11)
BASOPHILS ABSOLUTE: 0 K/UL (ref 0–0.2)
BASOPHILS RELATIVE PERCENT: 0.1 %
BUN BLDV-MCNC: 20 MG/DL (ref 8–23)
CALCIUM SERPL-MCNC: 8.9 MG/DL (ref 8.5–9.9)
CHLORIDE BLD-SCNC: 103 MEQ/L (ref 95–107)
CO2: 24 MEQ/L (ref 20–31)
CREAT SERPL-MCNC: 0.41 MG/DL (ref 0.5–0.9)
EOSINOPHILS ABSOLUTE: 0 K/UL (ref 0–0.7)
EOSINOPHILS RELATIVE PERCENT: 0.1 %
GFR AFRICAN AMERICAN: >60
GFR NON-AFRICAN AMERICAN: >60
GLUCOSE BLD-MCNC: 125 MG/DL (ref 70–99)
HCT VFR BLD CALC: 29.5 % (ref 37–47)
HEMOGLOBIN: 10 G/DL (ref 12–16)
LYMPHOCYTES ABSOLUTE: 0.2 K/UL (ref 1–4.8)
LYMPHOCYTES RELATIVE PERCENT: 2 %
MACROCYTES: ABNORMAL
MCH RBC QN AUTO: 33.3 PG (ref 27–31.3)
MCHC RBC AUTO-ENTMCNC: 33.8 % (ref 33–37)
MCV RBC AUTO: 98.5 FL (ref 82–100)
METAMYELOCYTES RELATIVE PERCENT: 2 %
MONOCYTES ABSOLUTE: 0.3 K/UL (ref 0.2–0.8)
MONOCYTES RELATIVE PERCENT: 3.9 %
MYELOCYTE PERCENT: 8 %
NEUTROPHILS ABSOLUTE: 8 K/UL (ref 1.4–6.5)
NEUTROPHILS RELATIVE PERCENT: 76 %
PDW BLD-RTO: 17.7 % (ref 11.5–14.5)
PLATELET # BLD: 380 K/UL (ref 130–400)
PLATELET SLIDE REVIEW: ABNORMAL
POLYCHROMASIA: ABNORMAL
POTASSIUM REFLEX MAGNESIUM: 4.9 MEQ/L (ref 3.4–4.9)
PROMYELOCYTES PERCENT: 3 %
RBC # BLD: 2.99 M/UL (ref 4.2–5.4)
SMUDGE CELLS: 2.9
SODIUM BLD-SCNC: 141 MEQ/L (ref 135–144)
TOXIC GRANULATION: ABNORMAL
WBC # BLD: 8.4 K/UL (ref 4.8–10.8)

## 2019-04-29 PROCEDURE — 6370000000 HC RX 637 (ALT 250 FOR IP): Performed by: INTERNAL MEDICINE

## 2019-04-29 PROCEDURE — 99232 SBSQ HOSP IP/OBS MODERATE 35: CPT | Performed by: INTERNAL MEDICINE

## 2019-04-29 PROCEDURE — 2580000003 HC RX 258: Performed by: HOSPITALIST

## 2019-04-29 PROCEDURE — 2580000003 HC RX 258: Performed by: INTERNAL MEDICINE

## 2019-04-29 PROCEDURE — 36415 COLL VENOUS BLD VENIPUNCTURE: CPT

## 2019-04-29 PROCEDURE — 94669 MECHANICAL CHEST WALL OSCILL: CPT

## 2019-04-29 PROCEDURE — 94640 AIRWAY INHALATION TREATMENT: CPT

## 2019-04-29 PROCEDURE — 6370000000 HC RX 637 (ALT 250 FOR IP): Performed by: HOSPITALIST

## 2019-04-29 PROCEDURE — 80048 BASIC METABOLIC PNL TOTAL CA: CPT

## 2019-04-29 PROCEDURE — 6360000002 HC RX W HCPCS: Performed by: INTERNAL MEDICINE

## 2019-04-29 PROCEDURE — 92611 MOTION FLUOROSCOPY/SWALLOW: CPT

## 2019-04-29 PROCEDURE — 94664 DEMO&/EVAL PT USE INHALER: CPT

## 2019-04-29 PROCEDURE — 2060000000 HC ICU INTERMEDIATE R&B

## 2019-04-29 PROCEDURE — 85025 COMPLETE CBC W/AUTO DIFF WBC: CPT

## 2019-04-29 PROCEDURE — 74230 X-RAY XM SWLNG FUNCJ C+: CPT

## 2019-04-29 PROCEDURE — 2700000000 HC OXYGEN THERAPY PER DAY

## 2019-04-29 PROCEDURE — 2500000003 HC RX 250 WO HCPCS: Performed by: INTERNAL MEDICINE

## 2019-04-29 RX ADMIN — AMPICILLIN AND SULBACTAM 1.5 G: 1; .5 INJECTION, POWDER, FOR SOLUTION INTRAMUSCULAR; INTRAVENOUS at 21:06

## 2019-04-29 RX ADMIN — HYDROCODONE BITARTRATE AND ACETAMINOPHEN 1 TABLET: 5; 325 TABLET ORAL at 14:36

## 2019-04-29 RX ADMIN — AMLODIPINE BESYLATE 5 MG: 5 TABLET ORAL at 09:41

## 2019-04-29 RX ADMIN — GUAIFENESIN 600 MG: 600 TABLET, EXTENDED RELEASE ORAL at 21:06

## 2019-04-29 RX ADMIN — ASPIRIN 81 MG 81 MG: 81 TABLET ORAL at 09:41

## 2019-04-29 RX ADMIN — IPRATROPIUM BROMIDE AND ALBUTEROL SULFATE 1 AMPULE: .5; 3 SOLUTION RESPIRATORY (INHALATION) at 07:19

## 2019-04-29 RX ADMIN — SENNOSIDES AND DOCUSATE SODIUM 2 TABLET: 8.6; 5 TABLET ORAL at 09:41

## 2019-04-29 RX ADMIN — AMPICILLIN AND SULBACTAM 1.5 G: 1; .5 INJECTION, POWDER, FOR SOLUTION INTRAMUSCULAR; INTRAVENOUS at 09:41

## 2019-04-29 RX ADMIN — IPRATROPIUM BROMIDE AND ALBUTEROL SULFATE 1 AMPULE: .5; 3 SOLUTION RESPIRATORY (INHALATION) at 12:46

## 2019-04-29 RX ADMIN — HYDROCODONE BITARTRATE AND ACETAMINOPHEN 1 TABLET: 5; 325 TABLET ORAL at 08:11

## 2019-04-29 RX ADMIN — IPRATROPIUM BROMIDE AND ALBUTEROL SULFATE 1 AMPULE: .5; 3 SOLUTION RESPIRATORY (INHALATION) at 21:06

## 2019-04-29 RX ADMIN — POLYETHYLENE GLYCOL 3350 17 G: 17 POWDER, FOR SOLUTION ORAL at 09:41

## 2019-04-29 RX ADMIN — FOLIC ACID 1 MG: 1 TABLET ORAL at 09:41

## 2019-04-29 RX ADMIN — Medication 10 ML: at 21:07

## 2019-04-29 RX ADMIN — PANTOPRAZOLE SODIUM 40 MG: 40 TABLET, DELAYED RELEASE ORAL at 17:31

## 2019-04-29 RX ADMIN — HYDROCODONE BITARTRATE AND ACETAMINOPHEN 1 TABLET: 5; 325 TABLET ORAL at 21:06

## 2019-04-29 RX ADMIN — ENOXAPARIN SODIUM 40 MG: 40 INJECTION SUBCUTANEOUS at 09:41

## 2019-04-29 RX ADMIN — PANTOPRAZOLE SODIUM 40 MG: 40 TABLET, DELAYED RELEASE ORAL at 06:11

## 2019-04-29 RX ADMIN — METHYLPREDNISOLONE SODIUM SUCCINATE 40 MG: 40 INJECTION, POWDER, FOR SOLUTION INTRAMUSCULAR; INTRAVENOUS at 21:06

## 2019-04-29 RX ADMIN — BARIUM SULFATE 50 ML: 0.81 POWDER, FOR SUSPENSION ORAL at 13:32

## 2019-04-29 RX ADMIN — ACETAMINOPHEN 650 MG: 325 TABLET ORAL at 06:10

## 2019-04-29 RX ADMIN — AMPICILLIN AND SULBACTAM 1.5 G: 1; .5 INJECTION, POWDER, FOR SOLUTION INTRAMUSCULAR; INTRAVENOUS at 14:29

## 2019-04-29 RX ADMIN — GUAIFENESIN 600 MG: 600 TABLET, EXTENDED RELEASE ORAL at 09:41

## 2019-04-29 RX ADMIN — METHYLPREDNISOLONE SODIUM SUCCINATE 40 MG: 40 INJECTION, POWDER, FOR SOLUTION INTRAMUSCULAR; INTRAVENOUS at 09:48

## 2019-04-29 RX ADMIN — ACETAMINOPHEN 650 MG: 325 TABLET ORAL at 14:29

## 2019-04-29 RX ADMIN — Medication 10 ML: at 09:42

## 2019-04-29 RX ADMIN — MULTIPLE VITAMINS W/ MINERALS TAB 1 TABLET: TAB at 09:41

## 2019-04-29 RX ADMIN — AMPICILLIN AND SULBACTAM 1.5 G: 1; .5 INJECTION, POWDER, FOR SOLUTION INTRAMUSCULAR; INTRAVENOUS at 01:52

## 2019-04-29 ASSESSMENT — PAIN DESCRIPTION - PROGRESSION
CLINICAL_PROGRESSION: GRADUALLY WORSENING

## 2019-04-29 ASSESSMENT — PAIN DESCRIPTION - DESCRIPTORS: DESCRIPTORS: DISCOMFORT

## 2019-04-29 ASSESSMENT — PAIN DESCRIPTION - ORIENTATION: ORIENTATION: LOWER

## 2019-04-29 ASSESSMENT — PAIN SCALES - GENERAL
PAINLEVEL_OUTOF10: 4
PAINLEVEL_OUTOF10: 0
PAINLEVEL_OUTOF10: 6
PAINLEVEL_OUTOF10: 4
PAINLEVEL_OUTOF10: 5
PAINLEVEL_OUTOF10: 4
PAINLEVEL_OUTOF10: 4

## 2019-04-29 ASSESSMENT — PAIN - FUNCTIONAL ASSESSMENT: PAIN_FUNCTIONAL_ASSESSMENT: PREVENTS OR INTERFERES SOME ACTIVE ACTIVITIES AND ADLS

## 2019-04-29 ASSESSMENT — PAIN DESCRIPTION - ONSET: ONSET: ON-GOING

## 2019-04-29 ASSESSMENT — PAIN DESCRIPTION - FREQUENCY: FREQUENCY: INTERMITTENT

## 2019-04-29 ASSESSMENT — PAIN DESCRIPTION - LOCATION: LOCATION: CHEST

## 2019-04-29 ASSESSMENT — PAIN DESCRIPTION - PAIN TYPE: TYPE: ACUTE PAIN

## 2019-04-29 NOTE — FLOWSHEET NOTE
Patient was awake in bed. I explained that she was due to have her IV changed. I assessed the area, but the patient stated she would prefer the IV to be changed after she ate lunch. All vitals were within normal range. Patient ambulates independently and feeds self.

## 2019-04-29 NOTE — PROGRESS NOTES
Hospitalist Progress Note      PCP: DERECK Bautista    Date of Admission: 4/19/2019    Chief Complaint:    Chief Complaint   Patient presents with    Shortness of Breath       Subjective: :  Breathing improving  No other complaints    Medications:  Reviewed    Infusion Medications   Scheduled Medications    ampicillin-sulbactam  1.5 g Intravenous Q6H    methylPREDNISolone  40 mg Intravenous Q12H    pantoprazole  40 mg Oral BID AC    acetaminophen  650 mg Oral 3 times per day    enoxaparin  40 mg Subcutaneous Daily    amLODIPine  5 mg Oral Daily    folic acid  1 mg Oral Daily    therapeutic multivitamin-minerals  1 tablet Oral Daily    sennosides-docusate sodium  2 tablet Oral Daily    tiotropium  18 mcg Inhalation Daily    ipratropium-albuterol  1 ampule Inhalation TID    aspirin  81 mg Oral Daily    sodium chloride flush  10 mL Intravenous 2 times per day    guaiFENesin  600 mg Oral BID    polyethylene glycol  17 g Oral Daily     PRN Meds: HYDROcodone 5 mg - acetaminophen, albuterol, sodium chloride flush, magnesium hydroxide, ondansetron      Intake/Output Summary (Last 24 hours) at 4/29/2019 0900  Last data filed at 4/29/2019 0856  Gross per 24 hour   Intake 1080 ml   Output --   Net 1080 ml       Exam:    /86   Pulse 87   Temp 97.5 °F (36.4 °C) (Oral)   Resp 19   Ht 5' 2\" (1.575 m)   Wt 108 lb 8 oz (49.2 kg)   SpO2 98%   BMI 19.84 kg/m²     General appearance: No apparent distress  HEENT:  Conjunctivae/corneas clear. Neck: Supple, with full range of motion  Respiratory:  Normal respiratory effort. bibasal crackles. Cardiovascular: Regular rate and rhythm with normal S1/S2 without murmurs, rubs or gallops. Abdomen: Soft, non-tender, non-distended with normal bowel sounds. Musculoskeletal: No clubbing, cyanosis or edema bilaterally. Skin: Skin color, texture, turgor normal.  No rashes or lesions.   Neuro: moving all extremities    Labs:   Recent Labs     04/27/19  0631 04/28/19  0625 04/29/19  0603   WBC 12.7* 10.1 8.4   HGB 10.9* 9.6* 10.0*   HCT 32.0* 27.9* 29.5*   * 385 380     Recent Labs     04/27/19  0631 04/28/19  0625 04/29/19  0603    136 141   K 4.2 4.1 4.9   CL 99 98 103   CO2 24 25 24   BUN 19 23 20   CREATININE 0.43* 0.38* 0.41*   CALCIUM 8.8 8.7 8.9     No results for input(s): AST, ALT, BILIDIR, BILITOT, ALKPHOS in the last 72 hours. No results for input(s): INR in the last 72 hours. No results for input(s): Fernanda Halon in the last 72 hours. Urinalysis:      Lab Results   Component Value Date    NITRU Negative 04/19/2019    WBCUA 3-5 01/01/2019    BACTERIA Few 01/01/2019    RBCUA 0-2 01/01/2019    BLOODU Negative 04/19/2019    SPECGRAV 1.014 04/19/2019    GLUCOSEU Negative 04/19/2019       Radiology:  CT CHEST HIGH RESOLUTION   Final Result      Marked bilateral diffuse emphysema. Bibasilar honeycombing, reflective of interstitial lung disease. Bibasilar subsegmental atelectasis/pneumonia. Bibasilar bronchiectasis. All CT scans at this facility use dose modulation, iterative reconstruction, and/or weight based dosing when appropriate to reduce radiation dose to as low as reasonably achievable. XR CHEST STANDARD (2 VW)   Final Result      XR CHEST STANDARD (2 VW)   Final Result   BILATERAL INFILTRATES DEVELOPING SINCE THE FIRST OF THE YEAR AND WORSE SINCE THE STUDY ONE MONTH AGO. PROGRESSIVE INTERSTITIAL SPREAD OF TUMOR VERSUS ACUTE PNEUMONIA ARE THE TOP CONSIDERATIONS. 1.5 CM NODULAR MASS CENTRAL RIGHT LUNG SUSPICIOUS FOR NEOPLASM. CTA CHEST W WO CONTRAST   Final Result   SINGLE SMALL DISTAL SUBSEGMENTAL PULMONARY ARTERIAL EMBOLUS IN THE RIGHT LOWER LOBE. NO BIBASILAR INFILTRATES SINCE FEBRUARY 2019. SCATTERED METASTATIC LUNG NODULES AGAIN NOTED. THE POSSIBILITY THAT THIS SMALL EMBOLUS IS SECONDARY TO TUMOR IS    QUESTIONED.                Fluoroscopy modified barium swallow with video    (Results GENERAL;  Dietary Nutrition Supplements: Standard High Calorie Oral Supplement  Dietary Nutrition Supplements: Clear Liquid Oral Supplement    Code Status: Full Code      Electronically signed by Jessica Bender MD on 4/29/2019 at 9:00 AM

## 2019-04-29 NOTE — PROGRESS NOTES
INPATIENT PROGRESS NOTES    PATIENT NAME: Boo Hayes  MRN: [id number]  SERVICE DATE:  2019   SERVICE TIME:  10:16 AM      PRIMARY SERVICE: Pulmonary Disease    CHIEF COMPLAINTS: SOB     INTERVAL HPI: Patient seen and examined at bedside, Interval Notes, orders reviewed. Nursing notes noted    Feeling better, SOB improved, no chest pain, cough is less and vest is helping moving secretion but still not bringing up well , no fever, slept well     Review of system:     GI Abdominal pain No  Skin Rash No    Social History     Tobacco Use    Smoking status: Former Smoker     Packs/day: 1.00     Years: 60.00     Pack years: 60.00     Types: Cigarettes     Start date: 1/10/1964     Last attempt to quit: 2019     Years since quittin.3    Smokeless tobacco: Never Used    Tobacco comment: quit in 2019   Substance Use Topics    Alcohol use: Not Currently     Alcohol/week: 9.0 oz     Types: 15 Cans of beer per week     Comment: per pt has not had any since Dec 28 2018         Problem Relation Age of Onset    Stroke Mother     Emphysema Father          OBJECTIVE    Body mass index is 19.84 kg/m². PHYSICAL EXAM:  Vitals:  /86   Pulse 87   Temp 97.5 °F (36.4 °C) (Oral)   Resp 19   Ht 5' 2\" (1.575 m)   Wt 108 lb 8 oz (49.2 kg)   SpO2 98%   BMI 19.84 kg/m²     General: alert, cooperative, no distress  Head: normocephalic, atraumatic  Eyes:No gross abnormalities. , PERRL and Sclera nonicteric  ENT:  MMM no lesions  Neck:  supple and no masses  Chest : Bilateral basal velcro rales, good air movement, no wheezing  Heart[de-identified] Heart sounds are normal.  Regular rate and rhythm without murmur, gallop or rub. ABD:  symmetric, liver span normal to percussion, soft, non-tender, spleen non-palpable  Musculoskeletal : no cyanosis, no clubbing and no edema  Neuro:  Grossly normal  Skin: No rashes or nodules noted.   Lymph node:  no cervical nodes  Urology: No Villalpnado   Psychiatric: appropriate    DATA: characterized on April 19, 2019 and have not changed since April 24, 2019. Individual pulmonary nodules are obscured and are now visible today. The heart has not enlarged. There is no appreciable pleural effusion. The mediastinum is not widened or shifted. The chest wall is unremarkable. CONCLUSION: PERSISTING ATYPICAL BASILAR AND LOWER LOBE INFILTRATES. NO EFFUSION IS DEVELOPED. Xr Chest Standard (2 Vw)    Result Date: 4/24/2019  EXAMINATION: XR CHEST (2 VW) REASON FOR EXAM: Bibasilar interstitial pneumonitis FINDINGS: Reticular nodular infiltrate developing in the lower lung fields superimposed on chronic interstitial scarring and hyperinflation. Infiltrates are new since January 1, 2019. Infiltrates more extensive compared to March 26, 2019. Ill-defined 1.4  cm nodular density developing in the central right lung between the posterior sixth and seventh right ribs is new. Recent PET would suggest this is related to neoplasm. No new infiltrates in the upper lobes. Bones osteopenic but intact. BILATERAL INFILTRATES DEVELOPING SINCE THE FIRST OF THE YEAR AND WORSE SINCE THE STUDY ONE MONTH AGO. PROGRESSIVE INTERSTITIAL SPREAD OF TUMOR VERSUS ACUTE PNEUMONIA ARE THE TOP CONSIDERATIONS. 1.5 CM NODULAR MASS CENTRAL RIGHT LUNG SUSPICIOUS FOR NEOPLASM. Ct Head W Wo Contrast    Result Date: 4/3/2019  COMPARISON: No prior studies available for comparison. HISTORY: C34.2 Malignant neoplasm of middle lobe of right lung ([address] ICD10 TECHNIQUE: CT HEAD W WO CONTRAST FINDINGS: Ventricles and sulci are unremarkable in size for a patient this age. No areas of abnormal density, hemorrhage, enhancement or mass effect are seen in the brain. A large lytic area is seen in the posterior parietal region on the right that measures 1 cm. It appears well circumscribed and appears to be a lipoma. A 1.6 mm lytic area is seen on the inner table of the skull and the occipital region on the right (series 3, image 13).  There are other tiny 3 to 4 mm lytic area is seen within the skull that are  thought to be vascular channels. In the right frontal region a 6 mm oval-shaped focus is seen series 3, image 19). No abnormal enhancement of the brain parenchyma. In the skull there are multiple lucent area seen but most are thought to be vascular channels for emissary veins or arachnoid granulations. A larger area seen in the skull posterior appears to be fat. If patient is symptomatic consider MRI. All CT scans at this facility use dose modulation, iterative reconstruction, and/or weight based dosing when appropriate to reduce radiation dose to as low as reasonably achievable. Cta Chest W Wo Contrast    Result Date: 4/19/2019  EXAMINATION: CTA CHEST W WO CONTRAST  DATE AND TIME:4/19/2019 6:15 PM CLINICAL HISTORY: Acute chest pain. Shortness of breath  CP; hx lung cancer last chemo was 4/09/2019. CP with inspiration. PE vs pneumonia. Lungs clear on exam.  COMPARISON: None available. TECHNIQUE: Helical axial CTA of the chest was performed following the intravenous administration of 100 mL Optiray 320 intravenous contrast.  2D images were reconstructed in the axial and coronal planes. 3-D MIP images were generated in the coronal plane. Images were reviewed on the PACS workstation. All images including the 3D MIPS were permanently archived. All CT scans at this facility use dose modulation, iterative reconstruction, and/or weight based dosing when appropriate to reduce radiation dose to as low as reasonably achievable. FINDINGS:  Embolus: There is a small intraluminal filling defect involving a distal subsegmental pulmonary artery branch to the right lower lobe . Findings are consistent with a small embolus. No other sites of pulmonary embolus. The possibility of tumor  embolus as opposed to thrombotic embolus is questioned but differentiating these can be extremely difficult.  There are bilateral irregular pulmonary nodules on a background of severe emphysema and nonspecific infiltrates at the lung bases. These infiltrates have developed since the PET scan examination of this patient on February 1, 2019. The possibility that some of these changes are related to lymphangitic spread of tumor is also questioned. Mediastinum: No other significant abnormality. No lymphadenopathy. No pericardial effusion. Visualized abdomen: Multiple hepatic cysts. Bones: No osseous abnormalities. SINGLE SMALL DISTAL SUBSEGMENTAL PULMONARY ARTERIAL EMBOLUS IN THE RIGHT LOWER LOBE. NO BIBASILAR INFILTRATES SINCE FEBRUARY 2019. SCATTERED METASTATIC LUNG NODULES AGAIN NOTED. THE POSSIBILITY THAT THIS SMALL EMBOLUS IS SECONDARY TO TUMOR IS  QUESTIONED. Ct Chest High Resolution    Result Date: 4/27/2019  CT of the Chest without intravenous contrast medium History:  Acute respiratory illness. Immunocompromised. Acute drug-induced interstitial pneumonia related to chemotherapy versus infection. COPD. Right lung base of adenocarcinoma, moderately well-differentiated. Technical Factors: CT imaging of the chest was obtained and formatted as 5 mm contiguous axial images from the thoracic inlet through the adrenal glands. Sagittal coronal reconstruction obtained during postprocessing. In addition, both prone and supine high-resolution imaging of the chest was obtained. Intravenous contrast medium:  None Comparison:  CTA chest, April 19, 2019, PET/CT, February 1, 2019, chest radiographs April 26, 2019, April 24, 2019. Findings: Right lung shows diffuse emphysematous change. Pleural-based masslike area is identified posteriorly within the superior segment of the right lower lobe, and extending anteriorly abutting the major fissure, retracting and posteriorly (series 5, image 13). Diffuse honeycombing, right lung base.  Interval development subsegmental consolidation anterior right lung base and lateral pleural-based right lung base. Bronchial wall thickening right lung base. Left lung shows diffuse emphysematous change. Scarring superior segment left lower lobe. Subsegmental consolidation left lung base. Bronchial wall thickening left lung base. Component of honeycombing identified at left lung base. Thoracic aorta normal in course and caliber. No hilar, mediastinal, or axillary lymph node enlargement. Limited imaging upper abdomen shows 1.8 x 1.6 cm cyst, anterior left hepatic lobe. Adrenal glands without anomaly. No osteoblastic, no osteolytic lesions. Marked bilateral diffuse emphysema. Bibasilar honeycombing, reflective of interstitial lung disease. Bibasilar subsegmental atelectasis/pneumonia. Bibasilar bronchiectasis. All CT scans at this facility use dose modulation, iterative reconstruction, and/or weight based dosing when appropriate to reduce radiation dose to as low as reasonably achievable. CT chest reviewed by me shows centrilobular emphysema, with bilateral basal fibrosis, bronchiectasis, and dense infiltrate        IMPRESSION AND SUGGESTION:  Patient is at risk due to   · Emphysema with pulmonary fibrosis  · And acute exacerbation  · With possible bibasilar pneumonia versus lymphangitic spread of underlying malignancy or drug toxicity  · And possible chronic recurrent reflux disease with aspiration  · History of adenocarcinoma on chemotherapy Carboplatin, Alimta, and Keytruda.   · Bibasilar bronchiectasis with retained secretions  · Chronic respiratory failure on 2 L oxygen at home    Recommendations  · Continue neb   · Steroid   · Pulmonary hygiene  · Cont Unasyn   · Bronch on Wednesday for airway clearance and biopsy   · O2 to keep sat 88-90%  · Vest for home  · Physical therapy     Electronically signed by Sherin Mcfarland MD,  FCCP   on 4/29/2019 at 10:16 AM

## 2019-04-29 NOTE — PROGRESS NOTES
[facility] Respiratory Therapy Evaluation   Current Order:  DUONEB TID AND ALBUTEROL Q2 PRN     Home Regimen: TID      Ordering Physician: Beth Hendricks  Re-evaluation Date:  5/2     Diagnosis: PE      Patient Status: Stable / Unstable + Physician notified    The following MDI Criteria must be met in order to convert aerosol to MDI with spacer.  If unable to meet, MDI will be converted to aerosol:  []  Patient able to demonstrate the ability to use MDI effectively  []  Patient alert and cooperative  []  Patient able to take deep breath with 5-10 second hold  []  Medication(s) available in this delivery method   []  Peak flow greater than or equal to 200 ml/min            Current Order Substituted To  (same drug, same frequency)   Aerosol to MDI [] Albuterol Sulfate 0.083% unit dose by aerosol Albuterol Sulfate MDI 2 puffs by inhalation with spacer    [] Levalbuterol 1.25 mg unit dose by aerosol Levalbuterol MDI 2 puffs by inhalation with spacer    [] Levalbuterol 0.63 mg unit dose by aerosol Levalbuterol MDI 2 puffs by inhalation with spacer    [] Ipratropium Bromide 0.02% unit dose by aerosol Ipratropium Bromide MDI 2 puffs by inhalation with spacer    [] Duoneb (Ipratropium + Albuterol) unit dose by aerosol Ipratropium MDI + Albuterol MDI 2 puffs by inhalation w/spacer   MDI to Aerosol [] Albuterol Sulfate MDI Albuterol Sulfate 0.083% unit dose by aerosol    [] Levalbuterol MDI 2 puffs by inhalation Levalbuterol 1.25 mg unit dose by aerosol    [] Ipratropium Bromide MDI by inhalation Ipratropium Bromide 0.02% unit dose by aerosol    [] Combivent (Ipratropium + Albuterol) MDI by inhalation Duoneb (Ipratropium + Albuterol) unit dose by aerosol   Treatment Assessment [Frequency/Schedule]:  Change frequency to: __________NO CHANGES TO CURRENT ORDER________________________________________per Protocol, P&T, [facility]      Points 0 1 2 3 4   Pulmonary Status  Non-Smoker  []   Smoking history   < 20 pack years  []   Smoking history  ?  20 pack years  []   Pulmonary Disorder  (acute or chronic)  [x]   Severe or Chronic w/ Exacerbation  []     Surgical Status No [x]   Surgeries     General []   Surgery Lower []   Abdominal Thoracic or []   Upper Abdominal Thoracic with  PulmonaryDisorder  []     Chest X-ray Clear/Not  Ordered     [x]  Chronic Changes  Results Pending  []  Infiltrates, atelectasis, pleural effusion, or edema  []  Infiltrates in more than one lobe []  Infiltrate + Atelectasis, &/or pleural effusion  []    Respiratory Pattern Regular,  RR = 12-20 [x]  Increased,  RR = 21-25 []  [place]END[place], irregular,  or RR = 26-30 []  Decreased FEV1  or RR = 31-35 []  Severe SOB, use  of accessory muscles, or RR ? 35  []    Mental Status Alert, oriented,  Cooperative [x]  Confused but Follows commands []  Lethargic or unable to follow commands []  Obtunded  []  Comatose  []    Breath Sounds Clear to  auscultation  []  Decreased unilaterally or  in bases only []  Decreased  bilaterally  []  Crackles or intermittent wheezes [x]  Wheezes []    Cough Strong, Spontan., & nonproductive [x]  Strong,  spontaneous, &  productive []  Weak,  Nonproductive []  Weak, productive or  with wheezes []  No spontaneous  cough or may require suctioning []    Level of Activity Ambulatory []  Ambulatory w/ Assist  [x]  Non-ambulatory []  Paraplegic []  Quadriplegic []    Total    Score:___7____     Triage Score:__4______      Tri       Triage:     1. (>20) Freq: Q3    2. (16-20) Freq: Q4   3. (11-15) Freq: QID & Albuterol Q2 PRN    4. (6-10) Freq: TID & Albuterol Q2 PRN    5. (0-5) Freq Q4prn

## 2019-04-29 NOTE — PROCEDURES
SPEECH/LANGUAGE PATHOLOGY  VIDEOFLUOROSCOPIC STUDY OF SWALLOWING (MBS)      PATIENT NAME:  Willy Hogue      :  1945    Room: U072/K136-63   TODAY'S DATE:  2019    Time in: 1320  Time out: 1330    [x]The admitting diagnosis and active problem list, as listed below have been reviewed prior to initiation of this evaluation.      ADMITTING DIAGNOSIS: Pulmonary embolism on right [facility]) [I26.99]     ACTIVE PROBLEM LIST:   Patient Active Problem List   Diagnosis    COPD exacerbation ([facility]    COPD (chronic obstructive pulmonary disease) (HCC)    Multiple lung nodules on CT    Tobacco abuse    Adenocarcinoma of right lung ([facility]    Malignant neoplasm of middle lobe of right lung (HCC)    Secondary malignant neoplasm of left lung (HCC)    B12 deficiency    Pulmonary embolism on right (HCC)    Anemia    Severe malnutrition (HCC)    Pulmonary fibrosis (HCC)       Allergies   Allergen Reactions    Levaquin [Levofloxacin In D5w] Itching and Rash       SUMMARY OF EVALUATION  DYSPHAGIA DIAGNOSIS:  Functional oral/pharyngeal swallow    DIET RECOMMENDATIONS: Regular diet with thin liquids      FEEDING RECOMMENDATIONS:   [x] No assistance required           [x] Feed in upright position      [x] Remain upright after meals    THERAPY RECOMMENDATIONS:   [x]  Therapy is not recommended     [x] Yonny RN notified                     OBJECTIVE ASSESSMENT    Oral mechanism examination:    [x]  Adequate lingual/labial strength      Dentition: [] Natural  []  Missing/teeth in poor repair  []  Edentulous  [x]  Dentures   []  Other    Current respiratory status:    [] Room Air   [x] Nasal cannula [] O2 mask           []  Non-rebreather mask  []  Tracheostomy  []  Vent dependent    Vocal quality prior to PO intake:  [x]  WFL  []  Weak  []  Wet    Cognitive status:    [x]  Alert    []  Lethargic  [x] Functional   [] Confused  [] Aphasic   [] Dysarthric   [] Impulsive   [] Cognitive Deficits                 DIET LEVEL PRIOR TO PENETRATION/ASPIRATION  [x]Laryngeal penetration was not present during this evaluation    [x]Aspiration was not present during this evaluation        Aspiration Scale  Puree  Solid  Thin 1 Material does not enter the airway    2 Material enters the airway, remains above the vocal folds, and is ejected from the airway    3 Material enters the airway, remains above the vocal folds, and is not ejected from the airway    4 Material enters the airway, contacts the vocal folds, an is ejected from the airway    5 Material enters the airway, contacts the vocal folds, and is not ejected from the airway    6 Material enters the airway, passes below the vocal folds and is ejected into the larynx or out of the airway    7 Material enters the airway, passes below the vocal folds, and is not ejected from the trachea despite effort    8 Material enters the airway, passes below the vocal folds, and no effort is made to eject. Pharyngeal Stage Impression:  Functional pharyngeal stage of swallow. No penetration/aspiration occurred. CERVICAL ESOPHAGEAL STAGE : [x]WFL   []Impaired   []Could not assess          Long Term Goals:  [] Will tolerate least restrictive diet safely      []Goals to be determined after MBS      [x] No Treatment indicated at this time        Pain:0 /10, N/A   []Nursing notified      Radiologist:  Available on Consult as needed, Radiology Tech present    Treatment goals were discussed with the: [x] patient  [] family. The [x]  patient []  family understand the diagnosis, prognosis and plan of care. []  Reinforcement is needed    Thank you for your referral.  Please direct any questions or concerns to Speech Pathology. Images are available through [facility]/PACS. Please see radiologist report for additional details. Therapist:  Electronically signed by Napoleon Doan.  RANDEE Dick on 4/29/2019 at 1:40 PM

## 2019-04-29 NOTE — CARE COORDINATION
Spoke to patient. States only limited by respiratory status. Denies PT/OT needs. Per old chart, patient current with Medical Services. Plans pending pulmonology for bronch, noted possibly Tuesday or Wednesday. Patient reports she is due for her last chemo treatment tomorrow and awaiting visit from oncology to determine if she will be able to get. Following for needed Chest Vest @ home. Patient currently on 4L/NC. Patient only reports will \"eventually\" want a wheelchair for long distances and denies other dme needs.

## 2019-04-30 LAB
ANION GAP SERPL CALCULATED.3IONS-SCNC: 16 MEQ/L (ref 9–15)
BASOPHILS ABSOLUTE: 0.1 K/UL (ref 0–0.2)
BASOPHILS RELATIVE PERCENT: 0.5 %
BUN BLDV-MCNC: 19 MG/DL (ref 8–23)
CALCIUM SERPL-MCNC: 9 MG/DL (ref 8.5–9.9)
CHLORIDE BLD-SCNC: 101 MEQ/L (ref 95–107)
CO2: 22 MEQ/L (ref 20–31)
CREAT SERPL-MCNC: 0.49 MG/DL (ref 0.5–0.9)
EOSINOPHILS ABSOLUTE: 0 K/UL (ref 0–0.7)
EOSINOPHILS RELATIVE PERCENT: 0 %
GFR AFRICAN AMERICAN: >60
GFR NON-AFRICAN AMERICAN: >60
GLUCOSE BLD-MCNC: 112 MG/DL (ref 70–99)
HCT VFR BLD CALC: 29.2 % (ref 37–47)
HEMOGLOBIN: 9.8 G/DL (ref 12–16)
INR BLD: 1
LYMPHOCYTES ABSOLUTE: 0.7 K/UL (ref 1–4.8)
LYMPHOCYTES RELATIVE PERCENT: 7.3 %
MCH RBC QN AUTO: 33.4 PG (ref 27–31.3)
MCHC RBC AUTO-ENTMCNC: 33.7 % (ref 33–37)
MCV RBC AUTO: 99.2 FL (ref 82–100)
MONOCYTES ABSOLUTE: 0.5 K/UL (ref 0.2–0.8)
MONOCYTES RELATIVE PERCENT: 5.1 %
NEUTROPHILS ABSOLUTE: 8.8 K/UL (ref 1.4–6.5)
NEUTROPHILS RELATIVE PERCENT: 87.1 %
PDW BLD-RTO: 17.8 % (ref 11.5–14.5)
PLATELET # BLD: 368 K/UL (ref 130–400)
POTASSIUM REFLEX MAGNESIUM: 4.5 MEQ/L (ref 3.4–4.9)
PROTHROMBIN TIME: 9.7 SEC (ref 9–11.5)
RBC # BLD: 2.95 M/UL (ref 4.2–5.4)
SODIUM BLD-SCNC: 139 MEQ/L (ref 135–144)
WBC # BLD: 10.2 K/UL (ref 4.8–10.8)

## 2019-04-30 PROCEDURE — 2580000003 HC RX 258: Performed by: HOSPITALIST

## 2019-04-30 PROCEDURE — 6370000000 HC RX 637 (ALT 250 FOR IP): Performed by: INTERNAL MEDICINE

## 2019-04-30 PROCEDURE — 6370000000 HC RX 637 (ALT 250 FOR IP): Performed by: HOSPITALIST

## 2019-04-30 PROCEDURE — 99232 SBSQ HOSP IP/OBS MODERATE 35: CPT | Performed by: INTERNAL MEDICINE

## 2019-04-30 PROCEDURE — 80048 BASIC METABOLIC PNL TOTAL CA: CPT

## 2019-04-30 PROCEDURE — 2580000003 HC RX 258: Performed by: INTERNAL MEDICINE

## 2019-04-30 PROCEDURE — 36415 COLL VENOUS BLD VENIPUNCTURE: CPT

## 2019-04-30 PROCEDURE — 94640 AIRWAY INHALATION TREATMENT: CPT

## 2019-04-30 PROCEDURE — 6360000002 HC RX W HCPCS: Performed by: INTERNAL MEDICINE

## 2019-04-30 PROCEDURE — 85025 COMPLETE CBC W/AUTO DIFF WBC: CPT

## 2019-04-30 PROCEDURE — 94669 MECHANICAL CHEST WALL OSCILL: CPT

## 2019-04-30 PROCEDURE — 85610 PROTHROMBIN TIME: CPT

## 2019-04-30 PROCEDURE — 2060000000 HC ICU INTERMEDIATE R&B

## 2019-04-30 PROCEDURE — 97165 OT EVAL LOW COMPLEX 30 MIN: CPT

## 2019-04-30 PROCEDURE — 97161 PT EVAL LOW COMPLEX 20 MIN: CPT

## 2019-04-30 PROCEDURE — 2700000000 HC OXYGEN THERAPY PER DAY

## 2019-04-30 RX ADMIN — PANTOPRAZOLE SODIUM 40 MG: 40 TABLET, DELAYED RELEASE ORAL at 06:33

## 2019-04-30 RX ADMIN — MULTIPLE VITAMINS W/ MINERALS TAB 1 TABLET: TAB at 07:38

## 2019-04-30 RX ADMIN — ACETAMINOPHEN 650 MG: 325 TABLET ORAL at 13:25

## 2019-04-30 RX ADMIN — HYDROCODONE BITARTRATE AND ACETAMINOPHEN 1 TABLET: 5; 325 TABLET ORAL at 07:44

## 2019-04-30 RX ADMIN — AMPICILLIN AND SULBACTAM 1.5 G: 1; .5 INJECTION, POWDER, FOR SOLUTION INTRAMUSCULAR; INTRAVENOUS at 02:13

## 2019-04-30 RX ADMIN — ENOXAPARIN SODIUM 40 MG: 40 INJECTION SUBCUTANEOUS at 07:38

## 2019-04-30 RX ADMIN — FOLIC ACID 1 MG: 1 TABLET ORAL at 07:37

## 2019-04-30 RX ADMIN — SENNOSIDES AND DOCUSATE SODIUM 2 TABLET: 8.6; 5 TABLET ORAL at 07:38

## 2019-04-30 RX ADMIN — IPRATROPIUM BROMIDE AND ALBUTEROL SULFATE 1 AMPULE: .5; 3 SOLUTION RESPIRATORY (INHALATION) at 19:35

## 2019-04-30 RX ADMIN — IPRATROPIUM BROMIDE AND ALBUTEROL SULFATE 1 AMPULE: .5; 3 SOLUTION RESPIRATORY (INHALATION) at 11:08

## 2019-04-30 RX ADMIN — METHYLPREDNISOLONE SODIUM SUCCINATE 40 MG: 40 INJECTION, POWDER, FOR SOLUTION INTRAMUSCULAR; INTRAVENOUS at 08:48

## 2019-04-30 RX ADMIN — AMPICILLIN AND SULBACTAM 1.5 G: 1; .5 INJECTION, POWDER, FOR SOLUTION INTRAMUSCULAR; INTRAVENOUS at 13:25

## 2019-04-30 RX ADMIN — GUAIFENESIN 600 MG: 600 TABLET, EXTENDED RELEASE ORAL at 20:43

## 2019-04-30 RX ADMIN — ASPIRIN 81 MG 81 MG: 81 TABLET ORAL at 07:38

## 2019-04-30 RX ADMIN — PANTOPRAZOLE SODIUM 40 MG: 40 TABLET, DELAYED RELEASE ORAL at 15:01

## 2019-04-30 RX ADMIN — POLYETHYLENE GLYCOL 3350 17 G: 17 POWDER, FOR SOLUTION ORAL at 07:38

## 2019-04-30 RX ADMIN — HYDROCODONE BITARTRATE AND ACETAMINOPHEN 1 TABLET: 5; 325 TABLET ORAL at 20:54

## 2019-04-30 RX ADMIN — GUAIFENESIN 600 MG: 600 TABLET, EXTENDED RELEASE ORAL at 07:37

## 2019-04-30 RX ADMIN — IPRATROPIUM BROMIDE AND ALBUTEROL SULFATE 1 AMPULE: .5; 3 SOLUTION RESPIRATORY (INHALATION) at 07:11

## 2019-04-30 RX ADMIN — AMLODIPINE BESYLATE 5 MG: 5 TABLET ORAL at 07:38

## 2019-04-30 RX ADMIN — Medication 10 ML: at 07:49

## 2019-04-30 RX ADMIN — AMPICILLIN AND SULBACTAM 1.5 G: 1; .5 INJECTION, POWDER, FOR SOLUTION INTRAMUSCULAR; INTRAVENOUS at 07:39

## 2019-04-30 RX ADMIN — METHYLPREDNISOLONE SODIUM SUCCINATE 40 MG: 40 INJECTION, POWDER, FOR SOLUTION INTRAMUSCULAR; INTRAVENOUS at 20:44

## 2019-04-30 RX ADMIN — Medication 10 ML: at 20:44

## 2019-04-30 RX ADMIN — AMPICILLIN AND SULBACTAM 1.5 G: 1; .5 INJECTION, POWDER, FOR SOLUTION INTRAMUSCULAR; INTRAVENOUS at 20:43

## 2019-04-30 RX ADMIN — TIOTROPIUM BROMIDE 18 MCG: 18 CAPSULE ORAL; RESPIRATORY (INHALATION) at 07:11

## 2019-04-30 ASSESSMENT — PAIN DESCRIPTION - PROGRESSION
CLINICAL_PROGRESSION: GRADUALLY WORSENING

## 2019-04-30 ASSESSMENT — PAIN DESCRIPTION - ORIENTATION: ORIENTATION: LOWER

## 2019-04-30 ASSESSMENT — PAIN SCALES - GENERAL
PAINLEVEL_OUTOF10: 0
PAINLEVEL_OUTOF10: 5
PAINLEVEL_OUTOF10: 0
PAINLEVEL_OUTOF10: 4
PAINLEVEL_OUTOF10: 1
PAINLEVEL_OUTOF10: 5
PAINLEVEL_OUTOF10: 0
PAINLEVEL_OUTOF10: 3

## 2019-04-30 ASSESSMENT — PAIN DESCRIPTION - DESCRIPTORS: DESCRIPTORS: DISCOMFORT

## 2019-04-30 ASSESSMENT — PAIN DESCRIPTION - FREQUENCY: FREQUENCY: INTERMITTENT

## 2019-04-30 ASSESSMENT — PAIN - FUNCTIONAL ASSESSMENT: PAIN_FUNCTIONAL_ASSESSMENT: PREVENTS OR INTERFERES SOME ACTIVE ACTIVITIES AND ADLS

## 2019-04-30 ASSESSMENT — PAIN DESCRIPTION - ONSET: ONSET: ON-GOING

## 2019-04-30 ASSESSMENT — PAIN DESCRIPTION - PAIN TYPE: TYPE: ACUTE PAIN

## 2019-04-30 ASSESSMENT — PAIN DESCRIPTION - LOCATION: LOCATION: CHEST

## 2019-04-30 NOTE — PLAN OF CARE
See OT evaluation for all goals and OT POC.  Electronically signed by CAROLINA Kaplan/L on 4/30/2019 at 12:03 PM

## 2019-04-30 NOTE — PROGRESS NOTES
[facility] OCCUPATIONAL THERAPY EVALUATION - ACUTE     Date: 2019  Patient Name: Chay Arnold        MRN: [id number]  Account: [de-identified]   : 1945  (75 y.o.)  Room: [place] [address]    Chart Review:  Diagnosis:  The primary encounter diagnosis was COPD exacerbation ([facility] Diagnoses of Malignant neoplasm of hilus of right lung ([facility] and Septic pulmonary embolism without acute cor pulmonale, unspecified chronicity ([facility] were also pertinent to this visit. Past Medical History:   Diagnosis Date    COPD (chronic obstructive pulmonary disease) ([facility]     Lung cancer ([facility]      Past Surgical History:   Procedure Laterality Date    APPENDECTOMY      HYSTERECTOMY      TONSILLECTOMY         Restrictions  Restrictions/Precautions: Fall Risk  Position Activity Restriction  Other position/activity restrictions: 4L02 (wears 2L at home)     Safety Devices: Safety Devices  Safety Devices in place: Yes  Type of devices:  All fall risk precautions in place    Subjective  Pre Treatment Pain Screening  Pain at present: 0  Scale Used: Numeric Score  Intervention List: Patient able to continue with treatment    Pain Reassessment:   Pain Assessment  Patient Currently in Pain: No  Pain Assessment: 0-10  Pain Level: 0       Orientation  Orientation  Overall Orientation Status: Within Normal Limits    Prior Level of Function:  Social/Functional History  Lives With: Significant other  Type of Home: House  Home Layout: Two level, Bed/Bath upstairs(has refrigerator and microwave upstairs; can stay up there most of the time)  Home Access: Stairs to enter without rails  Entrance Stairs - Number of Steps: 3  Bathroom Shower/Tub: Tub/Shower unit  Home Equipment: (does not have equipment, considering WC or rollator to assist with carrying her portable oxygen)  ADL Assistance: Independent  Homemaking Assistance: Needs assistance  Ambulation Assistance: Independent(no AD)  Transfer Assistance: Independent  Occupation: Part time

## 2019-04-30 NOTE — PROGRESS NOTES
Hospitalist Progress Note      PCP: DERECK Welch    Date of Admission: 4/19/2019    Chief Complaint:    Chief Complaint   Patient presents with    Shortness of Breath       Subjective: :  Shortness of breath slightly better. Medications:  Reviewed    Infusion Medications   Scheduled Medications    ampicillin-sulbactam  1.5 g Intravenous Q6H    methylPREDNISolone  40 mg Intravenous Q12H    pantoprazole  40 mg Oral BID AC    acetaminophen  650 mg Oral 3 times per day    [Held by provider] enoxaparin  40 mg Subcutaneous Daily    amLODIPine  5 mg Oral Daily    folic acid  1 mg Oral Daily    therapeutic multivitamin-minerals  1 tablet Oral Daily    sennosides-docusate sodium  2 tablet Oral Daily    tiotropium  18 mcg Inhalation Daily    ipratropium-albuterol  1 ampule Inhalation TID    aspirin  81 mg Oral Daily    sodium chloride flush  10 mL Intravenous 2 times per day    guaiFENesin  600 mg Oral BID    polyethylene glycol  17 g Oral Daily     PRN Meds: HYDROcodone 5 mg - acetaminophen, albuterol, sodium chloride flush, magnesium hydroxide, ondansetron      Intake/Output Summary (Last 24 hours) at 4/30/2019 1240  Last data filed at 4/30/2019 0830  Gross per 24 hour   Intake 720 ml   Output --   Net 720 ml       Exam:    /73   Pulse 88   Temp 97.5 °F (36.4 °C) (Oral)   Resp 18   Ht 5' 2\" (1.575 m)   Wt 108 lb 8 oz (49.2 kg)   SpO2 96%   BMI 19.84 kg/m²     General appearance: No apparent distress  HEENT:  Conjunctivae/corneas clear. Neck: Supple, with full range of motion  Respiratory:  Normal respiratory effort. bibasal crackles. Cardiovascular: Regular rate and rhythm with normal S1/S2 without murmurs, rubs or gallops. Abdomen: Soft, non-tender, non-distended with normal bowel sounds. Musculoskeletal: No clubbing, cyanosis or edema bilaterally. Skin: Skin color, texture, turgor normal.  No rashes or lesions.   Neuro: moving all extremities      Labs:   Recent Labs 04/28/19  0625 04/29/19  0603 04/30/19  0628   WBC 10.1 8.4 10.2   HGB 9.6* 10.0* 9.8*   HCT 27.9* 29.5* 29.2*    380 368     Recent Labs     04/28/19  0625 04/29/19  0603 04/30/19  0628    141 139   K 4.1 4.9 4.5   CL 98 103 101   CO2 25 24 22   BUN 23 20 19   CREATININE 0.38* 0.41* 0.49*   CALCIUM 8.7 8.9 9.0     No results for input(s): AST, ALT, BILIDIR, BILITOT, ALKPHOS in the last 72 hours. Recent Labs     04/30/19 0628   INR 1.0     No results for input(s): Mauro Pheasant in the last 72 hours. Urinalysis:      Lab Results   Component Value Date    NITRU Negative 04/19/2019    WBCUA 3-5 01/01/2019    BACTERIA Few 01/01/2019    RBCUA 0-2 01/01/2019    BLOODU Negative 04/19/2019    SPECGRAV 1.014 04/19/2019    GLUCOSEU Negative 04/19/2019       Radiology:  Fluoroscopy modified barium swallow with video   Final Result   NORMAL MODIFIED BARIUM SWALLOW               CT CHEST HIGH RESOLUTION   Final Result      Marked bilateral diffuse emphysema. Bibasilar honeycombing, reflective of interstitial lung disease. Bibasilar subsegmental atelectasis/pneumonia. Bibasilar bronchiectasis. All CT scans at this facility use dose modulation, iterative reconstruction, and/or weight based dosing when appropriate to reduce radiation dose to as low as reasonably achievable. XR CHEST STANDARD (2 VW)   Final Result      XR CHEST STANDARD (2 VW)   Final Result   BILATERAL INFILTRATES DEVELOPING SINCE THE FIRST OF THE YEAR AND WORSE SINCE THE STUDY ONE MONTH AGO. PROGRESSIVE INTERSTITIAL SPREAD OF TUMOR VERSUS ACUTE PNEUMONIA ARE THE TOP CONSIDERATIONS. 1.5 CM NODULAR MASS CENTRAL RIGHT LUNG SUSPICIOUS FOR NEOPLASM. CTA CHEST W WO CONTRAST   Final Result   SINGLE SMALL DISTAL SUBSEGMENTAL PULMONARY ARTERIAL EMBOLUS IN THE RIGHT LOWER LOBE. NO BIBASILAR INFILTRATES SINCE FEBRUARY 2019. SCATTERED METASTATIC LUNG NODULES AGAIN NOTED.  THE POSSIBILITY THAT THIS SMALL EMBOLUS IS SECONDARY TO TUMOR IS    QUESTIONED. Assessment/Plan:    Active Hospital Problems    Diagnosis Date Noted    Pulmonary fibrosis ([facility] [J84.10]     Severe malnutrition ([facility] [E43] 04/20/2019    Pulmonary embolism on right ([address] [I26.99] 04/19/2019    Anemia [D64.9] 04/19/2019    B12 deficiency [E53.8] 02/15/2019    Secondary malignant neoplasm of left lung ([facility] [C78.02] 01/24/2019    Malignant neoplasm of middle lobe of right lung ([facility] [C34.2] 01/24/2019    Adenocarcinoma of right lung (HCC) [C34.91] 01/17/2019    Tobacco abuse [Z72.0]     COPD (chronic obstructive pulmonary disease) ([facility] [J44.9] 01/02/2019    COPD exacerbation ([facility] [J44.1] 01/01/2019       76 yo female with history of lung cancer, COPD, pulmonary fibrosis, sever malnutrition who presented with sob.      Dyspnea, acute on chronic hypoxic respiratory failure on 2l home O2  - 2/2 COPD exacerbation and possible acute drug induced interstitial pneumonitis or possible infection  - on solumedrol, duonebs  - pulmonary planning for bronchoscopy on wednesday  - continue unasyn  - MBS normal     PE   - on prophylactic lovenox only per oncology recommendation as PE is incidental finding and too small to cause her symptoms.      Lung cancer with metastasis  - oncology following     Severe malnutrition - on ensure. Dietician following      Additional work up or/and treatment plan may be added today or then after based on clinical progression. I am managing a portion of pt care. Some medical issues are handled by other specialists. Additional work up and treatment should be done in out pt setting by pt PCP and other out pt providers. In addition to examining and evaluating pt, I spent additional time explaining care, normal and abnormal findings, and treatment plan. All of pt questions were answered. Counseling, diet and education were  provided. Case will be discussed with nursing staff when appropriate.  Family will be

## 2019-04-30 NOTE — PROGRESS NOTES
INPATIENT PROGRESS NOTES    PATIENT NAME: Geetha Underwood  MRN: [id number]  SERVICE DATE:  2019   SERVICE TIME:  11:42 AM      PRIMARY SERVICE: Pulmonary Disease    CHIEF COMPLAINTS: SOB     INTERVAL HPI: Patient seen and examined at bedside, Interval Notes, orders reviewed. Nursing notes noted    Still SOB, slightly improved, HENDEROSN, cough, unable to bring secretions up , no fever, no nausea no vomiting     Review of system:     GI Abdominal pain No  Skin Rash No    Social History     Tobacco Use    Smoking status: Former Smoker     Packs/day: 1.00     Years: 60.00     Pack years: 60.00     Types: Cigarettes     Start date: 1/10/1964     Last attempt to quit: 2019     Years since quittin.3    Smokeless tobacco: Never Used    Tobacco comment: quit in 2019   Substance Use Topics    Alcohol use: Not Currently     Alcohol/week: 9.0 oz     Types: 15 Cans of beer per week     Comment: per pt has not had any since Dec 28 2018         Problem Relation Age of Onset    Stroke Mother     Emphysema Father          OBJECTIVE    Body mass index is 19.84 kg/m². PHYSICAL EXAM:  Vitals:  /73   Pulse 88   Temp 97.5 °F (36.4 °C) (Oral)   Resp 18   Ht 5' 2\" (1.575 m)   Wt 108 lb 8 oz (49.2 kg)   SpO2 96%   BMI 19.84 kg/m²     General: alert, cooperative, no distress  Head: normocephalic, atraumatic  Eyes:No gross abnormalities. , PERRL and Sclera nonicteric  ENT:  MMM no lesions  Neck:  supple and no masses  Chest : bilateral rales , no wheezing, not tender, tympanic   Heart[de-identified] Heart sounds are normal.  Regular rate and rhythm without murmur, gallop or rub. ABD:  symmetric, liver span normal to percussion, soft, non-tender, spleen non-palpable  Musculoskeletal : no cyanosis, no clubbing and no edema  Neuro:  Grossly normal  Skin: No rashes or nodules noted.   Lymph node:  no cervical nodes  Urology: No Villalpando   Psychiatric: appropriate    DATA:   Recent Labs     19  0603 19  0628   WBC 8. 4 10.2   HGB 10.0* 9.8*   HCT 29.5* 29.2*   MCV 98.5 99.2    368     Recent Labs     04/29/19  0603 04/30/19  0628    139   K 4.9 4.5    101   CO2 24 22   BUN 20 19   CREATININE 0.41* 0.49*   GLUCOSE 125* 112*   CALCIUM 8.9 9.0   LABGLOM >60.0 >60.0   GFRAA >60.0 >60.0       MV Settings:          No results for input(s): PHART, DOS7JFD, PO2ART, YUW2TZA, BEART, Y9CFCXER in the last 72 hours. O2 Device: Nasal cannula  O2 Flow Rate (L/min): 4 L/min    DIET GENERAL;  Dietary Nutrition Supplements: Standard High Calorie Oral Supplement  Dietary Nutrition Supplements: Clear Liquid Oral Supplement     MEDICATIONS during current hospitalization:    Continuous Infusions:    Scheduled Meds:   ampicillin-sulbactam  1.5 g Intravenous Q6H    methylPREDNISolone  40 mg Intravenous Q12H    pantoprazole  40 mg Oral BID AC    acetaminophen  650 mg Oral 3 times per day    enoxaparin  40 mg Subcutaneous Daily    amLODIPine  5 mg Oral Daily    folic acid  1 mg Oral Daily    therapeutic multivitamin-minerals  1 tablet Oral Daily    sennosides-docusate sodium  2 tablet Oral Daily    tiotropium  18 mcg Inhalation Daily    ipratropium-albuterol  1 ampule Inhalation TID    aspirin  81 mg Oral Daily    sodium chloride flush  10 mL Intravenous 2 times per day    guaiFENesin  600 mg Oral BID    polyethylene glycol  17 g Oral Daily       PRN Meds:HYDROcodone 5 mg - acetaminophen, albuterol, sodium chloride flush, magnesium hydroxide, ondansetron    Radiology  Xr Chest Standard (2 Vw)    Result Date: 4/26/2019  EXAMINATION: XR CHEST (2 VW) CLINICAL HISTORY: Follow-up infiltrates COMPARISONS: March 26, 2019. April 19, 2019. April 24, 2019. FINDINGS: Frontal and lateral views of the chest were obtained.  There are persisting bilateral infiltrates in a persisting smaller airspace opacities that have developed since March 26, 2019, were well depicted and well characterized on April 19, 2019 and have not changed since April 24, 2019. Individual pulmonary nodules are obscured and are now visible today. The heart has not enlarged. There is no appreciable pleural effusion. The mediastinum is not widened or shifted. The chest wall is unremarkable. CONCLUSION: PERSISTING ATYPICAL BASILAR AND LOWER LOBE INFILTRATES. NO EFFUSION IS DEVELOPED. Xr Chest Standard (2 Vw)    Result Date: 4/24/2019  EXAMINATION: XR CHEST (2 VW) REASON FOR EXAM: Bibasilar interstitial pneumonitis FINDINGS: Reticular nodular infiltrate developing in the lower lung fields superimposed on chronic interstitial scarring and hyperinflation. Infiltrates are new since January 1, 2019. Infiltrates more extensive compared to March 26, 2019. Ill-defined 1.4  cm nodular density developing in the central right lung between the posterior sixth and seventh right ribs is new. Recent PET would suggest this is related to neoplasm. No new infiltrates in the upper lobes. Bones osteopenic but intact. BILATERAL INFILTRATES DEVELOPING SINCE THE FIRST OF THE YEAR AND WORSE SINCE THE STUDY ONE MONTH AGO. PROGRESSIVE INTERSTITIAL SPREAD OF TUMOR VERSUS ACUTE PNEUMONIA ARE THE TOP CONSIDERATIONS. 1.5 CM NODULAR MASS CENTRAL RIGHT LUNG SUSPICIOUS FOR NEOPLASM. Ct Head W Wo Contrast    Result Date: 4/3/2019  COMPARISON: No prior studies available for comparison. HISTORY: C34.2 Malignant neoplasm of middle lobe of right lung ([address] ICD10 TECHNIQUE: CT HEAD W WO CONTRAST FINDINGS: Ventricles and sulci are unremarkable in size for a patient this age. No areas of abnormal density, hemorrhage, enhancement or mass effect are seen in the brain. A large lytic area is seen in the posterior parietal region on the right that measures 1 cm. It appears well circumscribed and appears to be a lipoma. A 1.6 mm lytic area is seen on the inner table of the skull and the occipital region on the right (series 3, image 13).  There are other tiny 3 to 4 mm lytic area is seen within the skull that are  thought to be vascular channels. In the right frontal region a 6 mm oval-shaped focus is seen series 3, image 19). No abnormal enhancement of the brain parenchyma. In the skull there are multiple lucent area seen but most are thought to be vascular channels for emissary veins or arachnoid granulations. A larger area seen in the skull posterior appears to be fat. If patient is symptomatic consider MRI. All CT scans at this facility use dose modulation, iterative reconstruction, and/or weight based dosing when appropriate to reduce radiation dose to as low as reasonably achievable. Cta Chest W Wo Contrast    Result Date: 4/19/2019  EXAMINATION: CTA CHEST W WO CONTRAST  DATE AND TIME:4/19/2019 6:15 PM CLINICAL HISTORY: Acute chest pain. Shortness of breath  CP; hx lung cancer last chemo was 4/09/2019. CP with inspiration. PE vs pneumonia. Lungs clear on exam.  COMPARISON: None available. TECHNIQUE: Helical axial CTA of the chest was performed following the intravenous administration of 100 mL Optiray 320 intravenous contrast.  2D images were reconstructed in the axial and coronal planes. 3-D MIP images were generated in the coronal plane. Images were reviewed on the PACS workstation. All images including the 3D MIPS were permanently archived. All CT scans at this facility use dose modulation, iterative reconstruction, and/or weight based dosing when appropriate to reduce radiation dose to as low as reasonably achievable. FINDINGS:  Embolus: There is a small intraluminal filling defect involving a distal subsegmental pulmonary artery branch to the right lower lobe . Findings are consistent with a small embolus. No other sites of pulmonary embolus. The possibility of tumor  embolus as opposed to thrombotic embolus is questioned but differentiating these can be extremely difficult.  There are bilateral irregular pulmonary nodules on a background of severe emphysema and nonspecific infiltrates at the lung bases. These infiltrates have developed since the PET scan examination of this patient on February 1, 2019. The possibility that some of these changes are related to lymphangitic spread of tumor is also questioned. Mediastinum: No other significant abnormality. No lymphadenopathy. No pericardial effusion. Visualized abdomen: Multiple hepatic cysts. Bones: No osseous abnormalities. SINGLE SMALL DISTAL SUBSEGMENTAL PULMONARY ARTERIAL EMBOLUS IN THE RIGHT LOWER LOBE. NO BIBASILAR INFILTRATES SINCE FEBRUARY 2019. SCATTERED METASTATIC LUNG NODULES AGAIN NOTED. THE POSSIBILITY THAT THIS SMALL EMBOLUS IS SECONDARY TO TUMOR IS  QUESTIONED. Ct Chest High Resolution    Result Date: 4/27/2019  CT of the Chest without intravenous contrast medium History:  Acute respiratory illness. Immunocompromised. Acute drug-induced interstitial pneumonia related to chemotherapy versus infection. COPD. Right lung base of adenocarcinoma, moderately well-differentiated. Technical Factors: CT imaging of the chest was obtained and formatted as 5 mm contiguous axial images from the thoracic inlet through the adrenal glands. Sagittal coronal reconstruction obtained during postprocessing. In addition, both prone and supine high-resolution imaging of the chest was obtained. Intravenous contrast medium:  None Comparison:  CTA chest, April 19, 2019, PET/CT, February 1, 2019, chest radiographs April 26, 2019, April 24, 2019. Findings: Right lung shows diffuse emphysematous change. Pleural-based masslike area is identified posteriorly within the superior segment of the right lower lobe, and extending anteriorly abutting the major fissure, retracting and posteriorly (series 5, image 13). Diffuse honeycombing, right lung base. Interval development subsegmental consolidation anterior right lung base and lateral pleural-based right lung base.  Bronchial wall thickening right lung base. Left lung shows diffuse emphysematous change. Scarring superior segment left lower lobe. Subsegmental consolidation left lung base. Bronchial wall thickening left lung base. Component of honeycombing identified at left lung base. Thoracic aorta normal in course and caliber. No hilar, mediastinal, or axillary lymph node enlargement. Limited imaging upper abdomen shows 1.8 x 1.6 cm cyst, anterior left hepatic lobe. Adrenal glands without anomaly. No osteoblastic, no osteolytic lesions. Marked bilateral diffuse emphysema. Bibasilar honeycombing, reflective of interstitial lung disease. Bibasilar subsegmental atelectasis/pneumonia. Bibasilar bronchiectasis. All CT scans at this facility use dose modulation, iterative reconstruction, and/or weight based dosing when appropriate to reduce radiation dose to as low as reasonably achievable. CT chest reviewed by me shows centrilobular emphysema, with bilateral basal fibrosis, bronchiectasis, and dense infiltrate        IMPRESSION AND SUGGESTION:  Patient is at risk due to   · Emphysema with pulmonary fibrosis  · And acute exacerbation and retained secretions  · With possible bibasilar pneumonia versus lymphangitic spread of underlying malignancy or drug toxicity  · And possible chronic recurrent reflux disease with aspiration  · History of adenocarcinoma on chemotherapy Carboplatin, Alimta, and Keytruda. · Bibasilar bronchiectasis with retained secretions  · Chronic respiratory failure on 2 L oxygen at home    Recommendations  · Continue neb   · Continue steroids  · Pulmonary hygiene  · Cont Unasyn   · Bronchoscopy tomorrow  · O2 to keep sat 88-90%  · Vest for home  · Physical therapy   · Discussed with patient risks include but not limited to bleeding, infection, lung collapse , cardiac arrhythmia, need for other procedures or allergy to med, benefits and alternatives discussed with patient.  Patient  agrees to proceed with procedure   · Nothing by mouth after midnight    Electronically signed by Rachel Beckford MD,  FCCP   on 4/30/2019 at 11:42 AM

## 2019-04-30 NOTE — PROGRESS NOTES
time)  Home Access: Stairs to enter without rails  Entrance Stairs - Number of Steps: 3  Bathroom Shower/Tub: Tub/Shower unit  Home Equipment: (does not have equipment, considering WC or rollator to assist with carrying her portable oxygen)  ADL Assistance: Independent  Homemaking Assistance: Needs assistance  Ambulation Assistance: Independent(no AD)  Transfer Assistance: Independent  Occupation: Part time employment  Type of occupation: works at [employer] 5hrs/day    OBJECTIVE:   Vision/Hearing:       Cognition:  Overall Orientation Status: Within Functional Limits  Follows Commands: Within Functional Limits         ROM:  RLE AROM: WFL  LLE AROM : WFL    Strength:  Strength RLE  Strength RLE: WFL  Strength LLE  Strength LLE: WFL    Neuro:  Balance  Sitting - Static: Good  Sitting - Dynamic: Good  Standing - Static: Good  Standing - Dynamic: Good             Bed mobility  Supine to Sit: Independent  Sit to Supine: Independent    Transfers  Sit to Stand: Independent  Stand to sit: Independent  Bed to Chair: Independent    Ambulation  Ambulation?: Yes  Ambulation 1  Surface: level tile  Device: No Device  Other Apparatus: O2  Assistance: Independent  Quality of Gait: WFL, manages 02 line safely  Distance: 40 ft (limited by 02 line)  Comments: mildly SOB, educated on energy conservation    Activity Tolerance  Activity Tolerance: Patient Tolerated treatment well          ASSESSMENT:   Decision Making: Low Complexity  History: medium  Exam: low  Clinical Presentation: medium  No Skilled PT: Independent with functional mobility     Prognosis: Good  Patient Education: energy conservation,     DISCHARGE RECOMMENDATIONS:  No Skilled PT: Independent with functional mobility     Pt is independent with all functional mobility. Pt states that she has no d/c needs or concerns for safe return home with PRN assistance from spouse. No skilled PT needs identified at this time.  Please re-consult if there is a change in functional mobility status. Educated on option of pulmonary rehab if indicated by physician.     REQUIRES PT FOLLOW UP: No      PLAN OF CARE:  Safety Devices  Type of devices: Left in bed, Call light within reach      [facility] (6 CLICK) [phone] Mobility Raw Score : 24     Therapy Time:   Individual   Time In 1140   Time Out 1149   Minutes 9           Bethany Guillen, [facility], 04/30/19 at 12:37 PM

## 2019-05-01 ENCOUNTER — ANESTHESIA EVENT (OUTPATIENT)
Dept: OPERATING ROOM | Age: 74
DRG: 205 | End: 2019-05-01
Payer: MEDICARE

## 2019-05-01 ENCOUNTER — APPOINTMENT (OUTPATIENT)
Dept: GENERAL RADIOLOGY | Age: 74
DRG: 205 | End: 2019-05-01
Payer: MEDICARE

## 2019-05-01 ENCOUNTER — ANESTHESIA (OUTPATIENT)
Dept: OPERATING ROOM | Age: 74
DRG: 205 | End: 2019-05-01
Payer: MEDICARE

## 2019-05-01 VITALS — OXYGEN SATURATION: 100 % | SYSTOLIC BLOOD PRESSURE: 99 MMHG | DIASTOLIC BLOOD PRESSURE: 56 MMHG

## 2019-05-01 LAB
ANION GAP SERPL CALCULATED.3IONS-SCNC: 14 MEQ/L (ref 9–15)
BASOPHILS ABSOLUTE: 0 K/UL (ref 0–0.2)
BASOPHILS RELATIVE PERCENT: 0.3 %
BUN BLDV-MCNC: 19 MG/DL (ref 8–23)
CALCIUM SERPL-MCNC: 8.7 MG/DL (ref 8.5–9.9)
CHLORIDE BLD-SCNC: 104 MEQ/L (ref 95–107)
CO2: 22 MEQ/L (ref 20–31)
CREAT SERPL-MCNC: 0.43 MG/DL (ref 0.5–0.9)
EOSINOPHILS ABSOLUTE: 0 K/UL (ref 0–0.7)
EOSINOPHILS RELATIVE PERCENT: 0 %
GFR AFRICAN AMERICAN: >60
GFR NON-AFRICAN AMERICAN: >60
GLUCOSE BLD-MCNC: 102 MG/DL (ref 70–99)
HCT VFR BLD CALC: 29.1 % (ref 37–47)
HEMOGLOBIN: 10 G/DL (ref 12–16)
LYMPHOCYTES ABSOLUTE: 0.8 K/UL (ref 1–4.8)
LYMPHOCYTES RELATIVE PERCENT: 7.8 %
MCH RBC QN AUTO: 34.3 PG (ref 27–31.3)
MCHC RBC AUTO-ENTMCNC: 34.4 % (ref 33–37)
MCV RBC AUTO: 99.7 FL (ref 82–100)
MONOCYTES ABSOLUTE: 0.7 K/UL (ref 0.2–0.8)
MONOCYTES RELATIVE PERCENT: 6.3 %
NEUTROPHILS ABSOLUTE: 9.2 K/UL (ref 1.4–6.5)
NEUTROPHILS RELATIVE PERCENT: 85.6 %
PDW BLD-RTO: 18.1 % (ref 11.5–14.5)
PLATELET # BLD: 350 K/UL (ref 130–400)
POTASSIUM REFLEX MAGNESIUM: 4.3 MEQ/L (ref 3.4–4.9)
RBC # BLD: 2.92 M/UL (ref 4.2–5.4)
SODIUM BLD-SCNC: 140 MEQ/L (ref 135–144)
WBC # BLD: 10.7 K/UL (ref 4.8–10.8)

## 2019-05-01 PROCEDURE — 94640 AIRWAY INHALATION TREATMENT: CPT

## 2019-05-01 PROCEDURE — 6370000000 HC RX 637 (ALT 250 FOR IP): Performed by: INTERNAL MEDICINE

## 2019-05-01 PROCEDURE — 87102 FUNGUS ISOLATION CULTURE: CPT

## 2019-05-01 PROCEDURE — 87205 SMEAR GRAM STAIN: CPT

## 2019-05-01 PROCEDURE — 2500000003 HC RX 250 WO HCPCS: Performed by: NURSE ANESTHETIST, CERTIFIED REGISTERED

## 2019-05-01 PROCEDURE — 31623 DX BRONCHOSCOPE/BRUSH: CPT | Performed by: INTERNAL MEDICINE

## 2019-05-01 PROCEDURE — 88342 IMHCHEM/IMCYTCHM 1ST ANTB: CPT

## 2019-05-01 PROCEDURE — 87206 SMEAR FLUORESCENT/ACID STAI: CPT

## 2019-05-01 PROCEDURE — 3700000001 HC ADD 15 MINUTES (ANESTHESIA): Performed by: INTERNAL MEDICINE

## 2019-05-01 PROCEDURE — 87015 SPECIMEN INFECT AGNT CONCNTJ: CPT

## 2019-05-01 PROCEDURE — 2580000003 HC RX 258: Performed by: INTERNAL MEDICINE

## 2019-05-01 PROCEDURE — 85025 COMPLETE CBC W/AUTO DIFF WBC: CPT

## 2019-05-01 PROCEDURE — 2580000003 HC RX 258: Performed by: HOSPITALIST

## 2019-05-01 PROCEDURE — 88305 TISSUE EXAM BY PATHOLOGIST: CPT

## 2019-05-01 PROCEDURE — 36415 COLL VENOUS BLD VENIPUNCTURE: CPT

## 2019-05-01 PROCEDURE — 3609027000 HC BRONCHOSCOPY FIBEROPTIC: Performed by: INTERNAL MEDICINE

## 2019-05-01 PROCEDURE — 88341 IMHCHEM/IMCYTCHM EA ADD ANTB: CPT

## 2019-05-01 PROCEDURE — 2500000003 HC RX 250 WO HCPCS: Performed by: INTERNAL MEDICINE

## 2019-05-01 PROCEDURE — 31624 DX BRONCHOSCOPE/LAVAGE: CPT | Performed by: INTERNAL MEDICINE

## 2019-05-01 PROCEDURE — 6360000002 HC RX W HCPCS: Performed by: INTERNAL MEDICINE

## 2019-05-01 PROCEDURE — 88112 CYTOPATH CELL ENHANCE TECH: CPT

## 2019-05-01 PROCEDURE — 87070 CULTURE OTHR SPECIMN AEROBIC: CPT

## 2019-05-01 PROCEDURE — 31628 BRONCHOSCOPY/LUNG BX EACH: CPT | Performed by: INTERNAL MEDICINE

## 2019-05-01 PROCEDURE — 87116 MYCOBACTERIA CULTURE: CPT

## 2019-05-01 PROCEDURE — 2700000000 HC OXYGEN THERAPY PER DAY

## 2019-05-01 PROCEDURE — 3700000000 HC ANESTHESIA ATTENDED CARE: Performed by: INTERNAL MEDICINE

## 2019-05-01 PROCEDURE — 2709999900 HC NON-CHARGEABLE SUPPLY: Performed by: INTERNAL MEDICINE

## 2019-05-01 PROCEDURE — 2060000000 HC ICU INTERMEDIATE R&B

## 2019-05-01 PROCEDURE — 0B9J8ZX DRAINAGE OF LEFT LOWER LUNG LOBE, VIA NATURAL OR ARTIFICIAL OPENING ENDOSCOPIC, DIAGNOSTIC: ICD-10-PCS | Performed by: INTERNAL MEDICINE

## 2019-05-01 PROCEDURE — 0BDB8ZX EXTRACTION OF LEFT LOWER LOBE BRONCHUS, VIA NATURAL OR ARTIFICIAL OPENING ENDOSCOPIC, DIAGNOSTIC: ICD-10-PCS | Performed by: INTERNAL MEDICINE

## 2019-05-01 PROCEDURE — 6360000002 HC RX W HCPCS: Performed by: NURSE ANESTHETIST, CERTIFIED REGISTERED

## 2019-05-01 PROCEDURE — 94668 MNPJ CHEST WALL SBSQ: CPT

## 2019-05-01 PROCEDURE — 71045 X-RAY EXAM CHEST 1 VIEW: CPT

## 2019-05-01 PROCEDURE — 7100000000 HC PACU RECOVERY - FIRST 15 MIN: Performed by: INTERNAL MEDICINE

## 2019-05-01 PROCEDURE — 7100000001 HC PACU RECOVERY - ADDTL 15 MIN: Performed by: INTERNAL MEDICINE

## 2019-05-01 PROCEDURE — 2580000003 HC RX 258: Performed by: NURSE ANESTHETIST, CERTIFIED REGISTERED

## 2019-05-01 PROCEDURE — 89051 BODY FLUID CELL COUNT: CPT

## 2019-05-01 PROCEDURE — 3209999900 FLUORO FOR SURGICAL PROCEDURES

## 2019-05-01 PROCEDURE — 80048 BASIC METABOLIC PNL TOTAL CA: CPT

## 2019-05-01 PROCEDURE — 6370000000 HC RX 637 (ALT 250 FOR IP): Performed by: HOSPITALIST

## 2019-05-01 RX ORDER — FENTANYL CITRATE 50 UG/ML
50 INJECTION, SOLUTION INTRAMUSCULAR; INTRAVENOUS EVERY 10 MIN PRN
Status: DISCONTINUED | OUTPATIENT
Start: 2019-05-01 | End: 2019-05-01 | Stop reason: HOSPADM

## 2019-05-01 RX ORDER — MAGNESIUM HYDROXIDE 1200 MG/15ML
LIQUID ORAL CONTINUOUS PRN
Status: COMPLETED | OUTPATIENT
Start: 2019-05-01 | End: 2019-05-01

## 2019-05-01 RX ORDER — ONDANSETRON 2 MG/ML
INJECTION INTRAMUSCULAR; INTRAVENOUS PRN
Status: DISCONTINUED | OUTPATIENT
Start: 2019-05-01 | End: 2019-05-01 | Stop reason: SDUPTHER

## 2019-05-01 RX ORDER — LIDOCAINE HYDROCHLORIDE 20 MG/ML
INJECTION, SOLUTION INTRAVENOUS PRN
Status: DISCONTINUED | OUTPATIENT
Start: 2019-05-01 | End: 2019-05-01 | Stop reason: SDUPTHER

## 2019-05-01 RX ORDER — EPINEPHRINE 1 MG/ML
INJECTION, SOLUTION, CONCENTRATE INTRAVENOUS PRN
Status: DISCONTINUED | OUTPATIENT
Start: 2019-05-01 | End: 2019-05-01 | Stop reason: HOSPADM

## 2019-05-01 RX ORDER — DIPHENHYDRAMINE HYDROCHLORIDE 50 MG/ML
12.5 INJECTION INTRAMUSCULAR; INTRAVENOUS
Status: DISCONTINUED | OUTPATIENT
Start: 2019-05-01 | End: 2019-05-01 | Stop reason: HOSPADM

## 2019-05-01 RX ORDER — LIDOCAINE HYDROCHLORIDE 10 MG/ML
1 INJECTION, SOLUTION EPIDURAL; INFILTRATION; INTRACAUDAL; PERINEURAL
Status: DISCONTINUED | OUTPATIENT
Start: 2019-05-01 | End: 2019-05-01 | Stop reason: HOSPADM

## 2019-05-01 RX ORDER — SODIUM CHLORIDE, SODIUM LACTATE, POTASSIUM CHLORIDE, CALCIUM CHLORIDE 600; 310; 30; 20 MG/100ML; MG/100ML; MG/100ML; MG/100ML
INJECTION, SOLUTION INTRAVENOUS CONTINUOUS PRN
Status: DISCONTINUED | OUTPATIENT
Start: 2019-05-01 | End: 2019-05-01 | Stop reason: SDUPTHER

## 2019-05-01 RX ORDER — ROCURONIUM BROMIDE 10 MG/ML
INJECTION, SOLUTION INTRAVENOUS PRN
Status: DISCONTINUED | OUTPATIENT
Start: 2019-05-01 | End: 2019-05-01 | Stop reason: SDUPTHER

## 2019-05-01 RX ORDER — MEPERIDINE HYDROCHLORIDE 25 MG/ML
12.5 INJECTION INTRAMUSCULAR; INTRAVENOUS; SUBCUTANEOUS EVERY 5 MIN PRN
Status: DISCONTINUED | OUTPATIENT
Start: 2019-05-01 | End: 2019-05-01 | Stop reason: HOSPADM

## 2019-05-01 RX ORDER — METOCLOPRAMIDE HYDROCHLORIDE 5 MG/ML
10 INJECTION INTRAMUSCULAR; INTRAVENOUS
Status: DISCONTINUED | OUTPATIENT
Start: 2019-05-01 | End: 2019-05-01 | Stop reason: HOSPADM

## 2019-05-01 RX ORDER — LIDOCAINE HYDROCHLORIDE 20 MG/ML
INJECTION, SOLUTION EPIDURAL; INFILTRATION; INTRACAUDAL; PERINEURAL PRN
Status: DISCONTINUED | OUTPATIENT
Start: 2019-05-01 | End: 2019-05-01 | Stop reason: HOSPADM

## 2019-05-01 RX ORDER — DEXAMETHASONE SODIUM PHOSPHATE 10 MG/ML
INJECTION INTRAMUSCULAR; INTRAVENOUS PRN
Status: DISCONTINUED | OUTPATIENT
Start: 2019-05-01 | End: 2019-05-01 | Stop reason: SDUPTHER

## 2019-05-01 RX ORDER — SODIUM CHLORIDE 0.9 % (FLUSH) 0.9 %
10 SYRINGE (ML) INJECTION PRN
Status: DISCONTINUED | OUTPATIENT
Start: 2019-05-01 | End: 2019-05-01 | Stop reason: HOSPADM

## 2019-05-01 RX ORDER — ONDANSETRON 2 MG/ML
4 INJECTION INTRAMUSCULAR; INTRAVENOUS
Status: DISCONTINUED | OUTPATIENT
Start: 2019-05-01 | End: 2019-05-01 | Stop reason: HOSPADM

## 2019-05-01 RX ORDER — FENTANYL CITRATE 50 UG/ML
INJECTION, SOLUTION INTRAMUSCULAR; INTRAVENOUS PRN
Status: DISCONTINUED | OUTPATIENT
Start: 2019-05-01 | End: 2019-05-01 | Stop reason: SDUPTHER

## 2019-05-01 RX ORDER — HYDROCODONE BITARTRATE AND ACETAMINOPHEN 5; 325 MG/1; MG/1
2 TABLET ORAL PRN
Status: DISCONTINUED | OUTPATIENT
Start: 2019-05-01 | End: 2019-05-01 | Stop reason: HOSPADM

## 2019-05-01 RX ORDER — PROPOFOL 10 MG/ML
INJECTION, EMULSION INTRAVENOUS PRN
Status: DISCONTINUED | OUTPATIENT
Start: 2019-05-01 | End: 2019-05-01 | Stop reason: SDUPTHER

## 2019-05-01 RX ORDER — HYDROCODONE BITARTRATE AND ACETAMINOPHEN 5; 325 MG/1; MG/1
1 TABLET ORAL PRN
Status: DISCONTINUED | OUTPATIENT
Start: 2019-05-01 | End: 2019-05-01 | Stop reason: HOSPADM

## 2019-05-01 RX ORDER — SODIUM CHLORIDE, SODIUM LACTATE, POTASSIUM CHLORIDE, CALCIUM CHLORIDE 600; 310; 30; 20 MG/100ML; MG/100ML; MG/100ML; MG/100ML
INJECTION, SOLUTION INTRAVENOUS CONTINUOUS
Status: DISCONTINUED | OUTPATIENT
Start: 2019-05-01 | End: 2019-05-01

## 2019-05-01 RX ORDER — SODIUM CHLORIDE 0.9 % (FLUSH) 0.9 %
10 SYRINGE (ML) INJECTION EVERY 12 HOURS SCHEDULED
Status: DISCONTINUED | OUTPATIENT
Start: 2019-05-01 | End: 2019-05-01 | Stop reason: HOSPADM

## 2019-05-01 RX ADMIN — AMPICILLIN AND SULBACTAM 1.5 G: 1; .5 INJECTION, POWDER, FOR SOLUTION INTRAMUSCULAR; INTRAVENOUS at 01:54

## 2019-05-01 RX ADMIN — DEXAMETHASONE SODIUM PHOSPHATE 10 MG: 10 INJECTION INTRAMUSCULAR; INTRAVENOUS at 14:47

## 2019-05-01 RX ADMIN — FENTANYL CITRATE 50 MCG: 50 INJECTION, SOLUTION INTRAMUSCULAR; INTRAVENOUS at 14:37

## 2019-05-01 RX ADMIN — AMPICILLIN AND SULBACTAM 1.5 G: 1; .5 INJECTION, POWDER, FOR SOLUTION INTRAMUSCULAR; INTRAVENOUS at 20:42

## 2019-05-01 RX ADMIN — HYDROCODONE BITARTRATE AND ACETAMINOPHEN 1 TABLET: 5; 325 TABLET ORAL at 08:25

## 2019-05-01 RX ADMIN — PANTOPRAZOLE SODIUM 40 MG: 40 TABLET, DELAYED RELEASE ORAL at 18:01

## 2019-05-01 RX ADMIN — AMLODIPINE BESYLATE 5 MG: 5 TABLET ORAL at 18:02

## 2019-05-01 RX ADMIN — LIDOCAINE HYDROCHLORIDE 60 MG: 20 INJECTION, SOLUTION INTRAVENOUS at 14:37

## 2019-05-01 RX ADMIN — METHYLPREDNISOLONE SODIUM SUCCINATE 40 MG: 40 INJECTION, POWDER, FOR SOLUTION INTRAMUSCULAR; INTRAVENOUS at 10:55

## 2019-05-01 RX ADMIN — ACETAMINOPHEN 650 MG: 325 TABLET ORAL at 22:29

## 2019-05-01 RX ADMIN — IPRATROPIUM BROMIDE AND ALBUTEROL SULFATE 1 AMPULE: .5; 3 SOLUTION RESPIRATORY (INHALATION) at 19:29

## 2019-05-01 RX ADMIN — PROPOFOL 100 MG: 10 INJECTION, EMULSION INTRAVENOUS at 14:37

## 2019-05-01 RX ADMIN — AMPICILLIN AND SULBACTAM 1.5 G: 1; .5 INJECTION, POWDER, FOR SOLUTION INTRAMUSCULAR; INTRAVENOUS at 08:25

## 2019-05-01 RX ADMIN — IPRATROPIUM BROMIDE AND ALBUTEROL SULFATE 1 AMPULE: .5; 3 SOLUTION RESPIRATORY (INHALATION) at 07:26

## 2019-05-01 RX ADMIN — ONDANSETRON 4 MG: 2 INJECTION INTRAMUSCULAR; INTRAVENOUS at 14:47

## 2019-05-01 RX ADMIN — GUAIFENESIN 600 MG: 600 TABLET, EXTENDED RELEASE ORAL at 20:41

## 2019-05-01 RX ADMIN — ASPIRIN 81 MG 81 MG: 81 TABLET ORAL at 18:02

## 2019-05-01 RX ADMIN — HYDROCODONE BITARTRATE AND ACETAMINOPHEN 1 TABLET: 5; 325 TABLET ORAL at 18:06

## 2019-05-01 RX ADMIN — SUGAMMADEX 200 MG: 100 INJECTION, SOLUTION INTRAVENOUS at 15:03

## 2019-05-01 RX ADMIN — PHENYLEPHRINE HYDROCHLORIDE 100 MCG: 10 INJECTION INTRAVENOUS at 14:54

## 2019-05-01 RX ADMIN — ROCURONIUM BROMIDE 40 MG: 10 INJECTION INTRAVENOUS at 14:37

## 2019-05-01 RX ADMIN — Medication 10 ML: at 20:41

## 2019-05-01 RX ADMIN — PHENYLEPHRINE HYDROCHLORIDE 100 MCG: 10 INJECTION INTRAVENOUS at 15:00

## 2019-05-01 RX ADMIN — FOLIC ACID 1 MG: 1 TABLET ORAL at 18:03

## 2019-05-01 RX ADMIN — MULTIPLE VITAMINS W/ MINERALS TAB 1 TABLET: TAB at 18:02

## 2019-05-01 RX ADMIN — PANTOPRAZOLE SODIUM 40 MG: 40 TABLET, DELAYED RELEASE ORAL at 06:04

## 2019-05-01 RX ADMIN — SODIUM CHLORIDE, POTASSIUM CHLORIDE, SODIUM LACTATE AND CALCIUM CHLORIDE: 600; 310; 30; 20 INJECTION, SOLUTION INTRAVENOUS at 14:33

## 2019-05-01 ASSESSMENT — PULMONARY FUNCTION TESTS
PIF_VALUE: 35
PIF_VALUE: 4
PIF_VALUE: 2
PIF_VALUE: 18
PIF_VALUE: 23
PIF_VALUE: 20
PIF_VALUE: 35
PIF_VALUE: 31
PIF_VALUE: 23
PIF_VALUE: 35
PIF_VALUE: 30
PIF_VALUE: 32
PIF_VALUE: 18
PIF_VALUE: 20
PIF_VALUE: 20
PIF_VALUE: 34
PIF_VALUE: 2
PIF_VALUE: 35
PIF_VALUE: 20
PIF_VALUE: 1
PIF_VALUE: 2
PIF_VALUE: 1
PIF_VALUE: 29
PIF_VALUE: 2
PIF_VALUE: 2
PIF_VALUE: 31
PIF_VALUE: 3
PIF_VALUE: 24
PIF_VALUE: 35
PIF_VALUE: 35
PIF_VALUE: 1
PIF_VALUE: 16
PIF_VALUE: 27
PIF_VALUE: 35

## 2019-05-01 ASSESSMENT — PAIN SCALES - GENERAL
PAINLEVEL_OUTOF10: 4
PAINLEVEL_OUTOF10: 0
PAINLEVEL_OUTOF10: 5
PAINLEVEL_OUTOF10: 0
PAINLEVEL_OUTOF10: 0
PAINLEVEL_OUTOF10: 5
PAINLEVEL_OUTOF10: 0

## 2019-05-01 ASSESSMENT — LIFESTYLE VARIABLES: SMOKING_STATUS: 1

## 2019-05-01 ASSESSMENT — PAIN - FUNCTIONAL ASSESSMENT: PAIN_FUNCTIONAL_ASSESSMENT: 0-10

## 2019-05-01 NOTE — PROGRESS NOTES
Hospitalist Progress Note      PCP: DERECK Blackburn    Date of Admission: 4/19/2019    Chief Complaint:    Chief Complaint   Patient presents with    Shortness of Breath       Subjective: :  No changes. Still feels short of breath. Medications:  Reviewed    Infusion Medications   Scheduled Medications    ampicillin-sulbactam  1.5 g Intravenous Q6H    methylPREDNISolone  40 mg Intravenous Q12H    pantoprazole  40 mg Oral BID AC    acetaminophen  650 mg Oral 3 times per day    [Held by provider] enoxaparin  40 mg Subcutaneous Daily    amLODIPine  5 mg Oral Daily    folic acid  1 mg Oral Daily    therapeutic multivitamin-minerals  1 tablet Oral Daily    sennosides-docusate sodium  2 tablet Oral Daily    tiotropium  18 mcg Inhalation Daily    ipratropium-albuterol  1 ampule Inhalation TID    aspirin  81 mg Oral Daily    sodium chloride flush  10 mL Intravenous 2 times per day    guaiFENesin  600 mg Oral BID    polyethylene glycol  17 g Oral Daily     PRN Meds: fentanNYL, HYDROmorphone, HYDROcodone 5 mg - acetaminophen **OR** HYDROcodone 5 mg - acetaminophen, diphenhydrAMINE, ondansetron, metoclopramide, meperidine, HYDROcodone 5 mg - acetaminophen, albuterol, sodium chloride flush, magnesium hydroxide, ondansetron      Intake/Output Summary (Last 24 hours) at 5/1/2019 1205  Last data filed at 4/30/2019 1720  Gross per 24 hour   Intake 400 ml   Output --   Net 400 ml       Exam:    /77   Pulse 88   Temp 98.1 °F (36.7 °C) (Oral)   Resp 18   Ht 5' 2\" (1.575 m)   Wt 108 lb 8 oz (49.2 kg)   SpO2 99%   BMI 19.84 kg/m²     General appearance: No apparent distress  HEENT:  Conjunctivae/corneas clear. Neck: Supple, with full range of motion  Respiratory:  Normal respiratory effort. bibasal crackles.   Cardiovascular: Regular rate and rhythm with normal S1/S2 without murmurs, rubs or gallops. Abdomen: Soft, non-tender, non-distended with normal bowel sounds.   Musculoskeletal: No clubbing, cyanosis or edema bilaterally. Skin: Skin color, texture, turgor normal.  No rashes or lesions. Neuro: moving all extremities     Labs:   Recent Labs     04/29/19  0603 04/30/19 0628 05/01/19 0625   WBC 8.4 10.2 10.7   HGB 10.0* 9.8* 10.0*   HCT 29.5* 29.2* 29.1*    368 350     Recent Labs     04/29/19  0603 04/30/19 0628 05/01/19 0625    139 140   K 4.9 4.5 4.3    101 104   CO2 24 22 22   BUN 20 19 19   CREATININE 0.41* 0.49* 0.43*   CALCIUM 8.9 9.0 8.7     No results for input(s): AST, ALT, BILIDIR, BILITOT, ALKPHOS in the last 72 hours. Recent Labs     04/30/19 0628   INR 1.0     No results for input(s): Dareen Serge in the last 72 hours. Urinalysis:      Lab Results   Component Value Date    NITRU Negative 04/19/2019    WBCUA 3-5 01/01/2019    BACTERIA Few 01/01/2019    RBCUA 0-2 01/01/2019    BLOODU Negative 04/19/2019    SPECGRAV 1.014 04/19/2019    GLUCOSEU Negative 04/19/2019       Radiology:  Fluoroscopy modified barium swallow with video   Final Result   NORMAL MODIFIED BARIUM SWALLOW               CT CHEST HIGH RESOLUTION   Final Result      Marked bilateral diffuse emphysema. Bibasilar honeycombing, reflective of interstitial lung disease. Bibasilar subsegmental atelectasis/pneumonia. Bibasilar bronchiectasis. All CT scans at this facility use dose modulation, iterative reconstruction, and/or weight based dosing when appropriate to reduce radiation dose to as low as reasonably achievable. XR CHEST STANDARD (2 VW)   Final Result      XR CHEST STANDARD (2 VW)   Final Result   BILATERAL INFILTRATES DEVELOPING SINCE THE FIRST OF THE YEAR AND WORSE SINCE THE STUDY ONE MONTH AGO. PROGRESSIVE INTERSTITIAL SPREAD OF TUMOR VERSUS ACUTE PNEUMONIA ARE THE TOP CONSIDERATIONS. 1.5 CM NODULAR MASS CENTRAL RIGHT LUNG SUSPICIOUS FOR NEOPLASM.           CTA CHEST W WO CONTRAST   Final Result   SINGLE SMALL DISTAL SUBSEGMENTAL PULMONARY ARTERIAL EMBOLUS IN THE RIGHT LOWER LOBE. NO BIBASILAR INFILTRATES SINCE FEBRUARY 2019. SCATTERED METASTATIC LUNG NODULES AGAIN NOTED. THE POSSIBILITY THAT THIS SMALL EMBOLUS IS SECONDARY TO TUMOR IS    QUESTIONED. Assessment/Plan:    Active Hospital Problems    Diagnosis Date Noted    Pulmonary fibrosis ([address] [J84.10]     Severe malnutrition ([address] [E43] 04/20/2019    Pulmonary embolism on right ([address] [I26.99] 04/19/2019    Anemia [D64.9] 04/19/2019    B12 deficiency [E53.8] 02/15/2019    Secondary malignant neoplasm of left lung ([address] [C78.02] 01/24/2019    Malignant neoplasm of middle lobe of right lung ([address] [C34.2] 01/24/2019    Adenocarcinoma of right lung (HCC) [C34.91] 01/17/2019    Tobacco abuse [Z72.0]     COPD (chronic obstructive pulmonary disease) ([address] [J44.9] 01/02/2019    COPD exacerbation ([address] [J44.1] 01/01/2019        74 yo female with history of lung cancer, COPD, pulmonary fibrosis, sever malnutrition who presented with sob.      Dyspnea, acute on chronic hypoxic respiratory failure on 2l home O2  - 2/2 COPD exacerbation and possible acute drug induced interstitial pneumonitis or possible infection  - on solumedrol, duonebs  - pulmonary planning for bronchoscopy on wednesday  - continue unasyn  - MBS normal     PE   - on prophylactic lovenox only per oncology recommendation as PE is incidental finding and too small to cause her symptoms.      Lung cancer with metastasis  - oncology following     Severe malnutrition - on ensure. Dietician following      Additional work up or/and treatment plan may be added today or then after based on clinical progression. I am managing a portion of pt care. Some medical issues are handled by other specialists. Additional work up and treatment should be done in out pt setting by pt PCP and other out pt providers.      In addition to examining and evaluating pt, I spent additional time explaining care, normal and abnormal findings, and treatment plan. All of pt questions were answered. Counseling, diet and education were  provided. Case will be discussed with nursing staff when appropriate. Family will be updated if and when appropriate.       Diet: Dietary Nutrition Supplements: Standard High Calorie Oral Supplement  Dietary Nutrition Supplements: Clear Liquid Oral Supplement  Diet NPO, After Midnight    Code Status: Full Code      Electronically signed by Mariam Vickers MD on 5/1/2019 at 12:05 PM

## 2019-05-01 NOTE — ANESTHESIA POSTPROCEDURE EVALUATION
Department of Anesthesiology  Postprocedure Note    Patient: Delfino Talley  MRN: [id number]  YOB: 1945  Date of evaluation: 5/1/2019  Time:  3:16 PM     Procedure Summary     Date:  05/01/19 [place]  [facility] [place] [facility]    Anesthesia Start:  1433 Anesthesia Stop:      Procedure:  BRONCHOSCOPY ROOM 184 (N/A ) Diagnosis:  (INPATIENT)    Surgeon:  Ricki Rosario MD Responsible Provider:  Edward Bergeron DO    Anesthesia Type:  general ASA Status:  4          Anesthesia Type: general    Sirena Phase I: Sirena Score: 10    Sirena Phase II:      Last vitals: Reviewed and per EMR flowsheets.        Anesthesia Post Evaluation    Patient location during evaluation: PACU  Patient participation: complete - patient participated  Level of consciousness: awake and awake and alert  Pain score: 0  Airway patency: patent  Nausea & Vomiting: no nausea and no vomiting  Complications: no  Cardiovascular status: blood pressure returned to baseline and hemodynamically stable  Respiratory status: acceptable  Hydration status: euvolemic

## 2019-05-01 NOTE — ANESTHESIA PRE PROCEDURE
Department of Anesthesiology  Preprocedure Note       Name:  Curtis Castanon   Age:  75 y.o.  :  1945                                          MRN:  [id number]         Date:  2019      Surgeon: Nabeel Alfaro):  Joe Luong MD    Procedure: BRONCHOSCOPY ROOM 184 (N/A )    Medications prior to admission:   Prior to Admission medications    Medication Sig Start Date End Date Taking?  Authorizing Provider   RaNITidine HCl (ZANTAC PO) Take 1 tablet by mouth as needed    Yes Historical Provider, MD   SENNA PO Take 2 tablets by mouth nightly    Yes Historical Provider, MD   albuterol (PROVENTIL) (2.5 MG/3ML) 0.083% nebulizer solution Take 3 mLs by nebulization every 6 hours as needed for Wheezing 3/26/19  Yes Sirisha Chen, DO   magnesium hydroxide (MILK OF MAGNESIA) 400 MG/5ML suspension Take 30 mLs by mouth daily as needed for Constipation   Yes Historical Provider, MD   folic acid (FOLVITE) 1 MG tablet Take 1 tablet by mouth daily 2/15/19  Yes Brett Combs, DO   Multiple Vitamins-Minerals (CENTRUM SILVER 50+WOMEN) TABS Take 1 tablet by mouth daily   Yes Historical Provider, MD   tiotropium (SPIRIVA RESPIMAT) 2.5 MCG/ACT AERS inhaler Inhale 2 puffs into the lungs daily 1/10/19  Yes Joe Luong MD   amLODIPine (NORVASC) 5 MG tablet Take 1 tablet by mouth daily 1/3/19  Yes Teo Emmanuel MD       Current medications:    Current Facility-Administered Medications   Medication Dose Route Frequency Provider Last Rate Last Dose    fentaNYL (SUBLIMAZE) injection 50 mcg  50 mcg Intravenous Q10 Min PRN Adams Ahumada, MD        HYDROmorphone (DILAUDID) injection 0.5 mg  0.5 mg Intravenous Q10 Min PRN Adams Ahumada, MD        HYDROcodone-acetaminophen [facility]) 5-325 MG per tablet 1 tablet  1 tablet Oral PRN Adams Ahumada, MD        Or    HYDROcodone-acetaminophen [facility]) 5-325 MG per tablet 2 tablet  2 tablet Oral PRN Adams Ahumada, MD        diphenhydrAMINE (BENADRYL) injection Aneudyito, APRN - CNP   2 tablet at 04/30/19 0738    tiotropium (SPIRIVA) inhalation capsule 18 mcg  18 mcg Inhalation Daily Leroy Angie, APRN - CNP   18 mcg at 04/30/19 0711    ipratropium-albuterol (DUONEB) nebulizer solution 1 ampule  1 ampule Inhalation TID Betsy Clink Sedar, DO   1 ampule at 05/01/19 0726    albuterol (PROVENTIL) nebulizer solution 2.5 mg  2.5 mg Nebulization Q2H PRN Betsy Clink Sedar, DO        aspirin chewable tablet 81 mg  81 mg Oral Daily Belia Romanian Siddonangelique, DO   81 mg at 04/30/19 9438    sodium chloride flush 0.9 % injection 10 mL  10 mL Intravenous 2 times per day Leroy [facility], APRN - CNP   10 mL at 04/30/19 2044    sodium chloride flush 0.9 % injection 10 mL  10 mL Intravenous PRN Leroy Furnish, APRN - CNP   10 mL at 04/25/19 1117    magnesium hydroxide (MILK OF MAGNESIA) 400 MG/5ML suspension 30 mL  30 mL Oral Daily PRN Leroy Furnish, APRN - CNP        ondansetron (ZOFRAN) injection 4 mg  4 mg Intravenous Q6H PRN Leroy Furnish, APRN - CNP        guaiFENesin [facility]) extended release tablet 600 mg  600 mg Oral BID Leroy Furnish, APRN - CNP   600 mg at 04/30/19 2043    polyethylene glycol (GLYCOLAX) packet 17 g  17 g Oral Daily Leroy Angie, APRN - CNP   17 g at 04/30/19 2981       Allergies:     Allergies   Allergen Reactions    Levaquin [Levofloxacin In D5w] Itching and Rash       Problem List:    Patient Active Problem List   Diagnosis Code    [facility] exacerb[facility] J44.1    COPD (chronic obstructive pulmonary disease) (HCC) J44.9    Multiple lung nodules on CT R91.8    Tobacco abuse Z72.0    Adenocarcinoma of right lung ([facility] C34.91    Malignant neoplasm of middle lobe of right lung (HCC) C34.2    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    B12 deficiency E53.8    Pulmonary embolism on right (HCC) I26.99    Anemia D64.9    Severe malnutrition ([address] E43    Pulmonary fibrosis ([facility] J84.10       Past Medical History:        Diagnosis Date    COPD (chronic obstructive pulmonary disease) ([facility]     Lung cancer (HCC)        Past Surgical History:        Procedure Laterality Date    APPENDECTOMY      HYSTERECTOMY      TONSILLECTOMY         Social History:    Social History     Tobacco Use    Smoking status: Former Smoker     Packs/day: 1.00     Years: 60.00     Pack years: 60.00     Types: Cigarettes     Start date: 1/10/1964     Last attempt to quit: 2019     Years since quittin.3    Smokeless tobacco: Never Used    Tobacco comment: quit in 2019   Substance Use Topics    Alcohol use: Not Currently     Alcohol/week: 9.0 oz     Types: 15 Cans of beer per week     Comment: per pt has not had any since Dec 28 2018                                Counseling given: Not Answered  Comment: quit in 2019      Vital Signs (Current):   Vitals:    19 1408 19 0751 19 0829 19 1244   BP: (!) 112/58 126/77  133/68   Pulse: 98 88  81   Resp:    Temp: 97.9 °F (36.6 °C) 98.1 °F (36.7 °C)  97.1 °F (36.2 °C)   TempSrc: Oral Oral  Temporal   SpO2: 95% 99% 99% 94%   Weight:       Height:                                                  BP Readings from Last 3 Encounters:   19 133/68   04/15/19 120/64   19 137/77       NPO Status: Time of last liquid consumption: 1400                        Time of last solid consumption: 1400                        Date of last liquid consumption: 19                        Date of last solid food consumption: 19    BMI:   Wt Readings from Last 3 Encounters:   19 108 lb 8 oz (49.2 kg)   04/15/19 94 lb (42.6 kg)   19 103 lb 5 oz (46.9 kg)     Body mass index is 19.84 kg/m².     CBC:   Lab Results   Component Value Date    WBC 10.7 2019    RBC 2.92 2019    HGB 10.0 2019    HCT 29.1 2019    MCV 99.7 2019    RDW 18.1 2019     2019       CMP:   Lab Results   Component Value Date     2019    K 4.3

## 2019-05-02 VITALS
HEART RATE: 106 BPM | HEIGHT: 62 IN | TEMPERATURE: 98.1 F | RESPIRATION RATE: 19 BRPM | BODY MASS INDEX: 19.96 KG/M2 | WEIGHT: 108.5 LBS | DIASTOLIC BLOOD PRESSURE: 77 MMHG | SYSTOLIC BLOOD PRESSURE: 106 MMHG | OXYGEN SATURATION: 95 %

## 2019-05-02 LAB
ANION GAP SERPL CALCULATED.3IONS-SCNC: 16 MEQ/L (ref 9–15)
ANISOCYTOSIS: ABNORMAL
BANDED NEUTROPHILS RELATIVE PERCENT: 7 % (ref 5–11)
BASOPHILS ABSOLUTE: 0 K/UL (ref 0–0.2)
BASOPHILS RELATIVE PERCENT: 0 %
BUN BLDV-MCNC: 23 MG/DL (ref 8–23)
CALCIUM SERPL-MCNC: 8.6 MG/DL (ref 8.5–9.9)
CHLORIDE BLD-SCNC: 101 MEQ/L (ref 95–107)
CO2: 21 MEQ/L (ref 20–31)
CREAT SERPL-MCNC: 0.53 MG/DL (ref 0.5–0.9)
EOSINOPHILS ABSOLUTE: 0 K/UL (ref 0–0.7)
EOSINOPHILS RELATIVE PERCENT: 0 %
GFR AFRICAN AMERICAN: >60
GFR NON-AFRICAN AMERICAN: >60
GLUCOSE BLD-MCNC: 146 MG/DL (ref 70–99)
HCT VFR BLD CALC: 30.6 % (ref 37–47)
HEMOGLOBIN: 10.1 G/DL (ref 12–16)
LYMPHOCYTES ABSOLUTE: 0.3 K/UL (ref 1–4.8)
LYMPHOCYTES RELATIVE PERCENT: 3 %
MCH RBC QN AUTO: 33.2 PG (ref 27–31.3)
MCHC RBC AUTO-ENTMCNC: 33.1 % (ref 33–37)
MCV RBC AUTO: 100.5 FL (ref 82–100)
METAMYELOCYTES RELATIVE PERCENT: 1 %
MONOCYTES ABSOLUTE: 0 K/UL (ref 0.2–0.8)
MONOCYTES RELATIVE PERCENT: 0 %
MYELOCYTE PERCENT: 3 %
NEUTROPHILS ABSOLUTE: 11.1 K/UL (ref 1.4–6.5)
NEUTROPHILS RELATIVE PERCENT: 86 %
PDW BLD-RTO: 18.3 % (ref 11.5–14.5)
PLATELET # BLD: 341 K/UL (ref 130–400)
PLATELET SLIDE REVIEW: ADEQUATE
POLYCHROMASIA: ABNORMAL
POTASSIUM REFLEX MAGNESIUM: 4.2 MEQ/L (ref 3.4–4.9)
RBC # BLD: 3.04 M/UL (ref 4.2–5.4)
SODIUM BLD-SCNC: 138 MEQ/L (ref 135–144)
WBC # BLD: 11.4 K/UL (ref 4.8–10.8)

## 2019-05-02 PROCEDURE — 6370000000 HC RX 637 (ALT 250 FOR IP): Performed by: HOSPITALIST

## 2019-05-02 PROCEDURE — 6370000000 HC RX 637 (ALT 250 FOR IP): Performed by: INTERNAL MEDICINE

## 2019-05-02 PROCEDURE — 94760 N-INVAS EAR/PLS OXIMETRY 1: CPT

## 2019-05-02 PROCEDURE — 36415 COLL VENOUS BLD VENIPUNCTURE: CPT

## 2019-05-02 PROCEDURE — 85025 COMPLETE CBC W/AUTO DIFF WBC: CPT

## 2019-05-02 PROCEDURE — 80048 BASIC METABOLIC PNL TOTAL CA: CPT

## 2019-05-02 PROCEDURE — 6360000002 HC RX W HCPCS: Performed by: INTERNAL MEDICINE

## 2019-05-02 PROCEDURE — 94640 AIRWAY INHALATION TREATMENT: CPT

## 2019-05-02 PROCEDURE — 99232 SBSQ HOSP IP/OBS MODERATE 35: CPT | Performed by: INTERNAL MEDICINE

## 2019-05-02 PROCEDURE — 2580000003 HC RX 258: Performed by: INTERNAL MEDICINE

## 2019-05-02 PROCEDURE — 94664 DEMO&/EVAL PT USE INHALER: CPT

## 2019-05-02 PROCEDURE — 2700000000 HC OXYGEN THERAPY PER DAY

## 2019-05-02 RX ORDER — PREDNISONE 10 MG/1
TABLET ORAL
Qty: 30 TABLET | Refills: 0 | Status: SHIPPED | OUTPATIENT
Start: 2019-05-02 | End: 2019-06-05 | Stop reason: ALTCHOICE

## 2019-05-02 RX ORDER — ASPIRIN 81 MG/1
81 TABLET, CHEWABLE ORAL DAILY
Qty: 30 TABLET | Refills: 3 | Status: SHIPPED | OUTPATIENT
Start: 2019-05-03

## 2019-05-02 RX ORDER — TRAMADOL HYDROCHLORIDE 50 MG/1
50 TABLET ORAL EVERY 4 HOURS PRN
Qty: 30 TABLET | Refills: 0 | Status: SHIPPED | OUTPATIENT
Start: 2019-05-02 | End: 2019-05-07

## 2019-05-02 RX ORDER — PANTOPRAZOLE SODIUM 40 MG/1
40 TABLET, DELAYED RELEASE ORAL DAILY
Qty: 30 TABLET | Refills: 3 | Status: SHIPPED | OUTPATIENT
Start: 2019-05-02

## 2019-05-02 RX ORDER — GUAIFENESIN 600 MG/1
600 TABLET, EXTENDED RELEASE ORAL 2 TIMES DAILY
Qty: 30 TABLET | Refills: 0 | Status: SHIPPED | OUTPATIENT
Start: 2019-05-02

## 2019-05-02 RX ADMIN — AMPICILLIN AND SULBACTAM 1.5 G: 1; .5 INJECTION, POWDER, FOR SOLUTION INTRAMUSCULAR; INTRAVENOUS at 01:59

## 2019-05-02 RX ADMIN — ENOXAPARIN SODIUM 40 MG: 40 INJECTION SUBCUTANEOUS at 08:27

## 2019-05-02 RX ADMIN — HYDROCODONE BITARTRATE AND ACETAMINOPHEN 1 TABLET: 5; 325 TABLET ORAL at 15:22

## 2019-05-02 RX ADMIN — SENNOSIDES AND DOCUSATE SODIUM 2 TABLET: 8.6; 5 TABLET ORAL at 08:30

## 2019-05-02 RX ADMIN — IPRATROPIUM BROMIDE AND ALBUTEROL SULFATE 1 AMPULE: .5; 3 SOLUTION RESPIRATORY (INHALATION) at 08:52

## 2019-05-02 RX ADMIN — METHYLPREDNISOLONE SODIUM SUCCINATE 40 MG: 40 INJECTION, POWDER, FOR SOLUTION INTRAMUSCULAR; INTRAVENOUS at 10:38

## 2019-05-02 RX ADMIN — AMPICILLIN AND SULBACTAM 1.5 G: 1; .5 INJECTION, POWDER, FOR SOLUTION INTRAMUSCULAR; INTRAVENOUS at 08:27

## 2019-05-02 RX ADMIN — TIOTROPIUM BROMIDE 18 MCG: 18 CAPSULE ORAL; RESPIRATORY (INHALATION) at 08:55

## 2019-05-02 RX ADMIN — ASPIRIN 81 MG 81 MG: 81 TABLET ORAL at 08:27

## 2019-05-02 RX ADMIN — MULTIPLE VITAMINS W/ MINERALS TAB 1 TABLET: TAB at 08:26

## 2019-05-02 RX ADMIN — GUAIFENESIN 600 MG: 600 TABLET, EXTENDED RELEASE ORAL at 08:27

## 2019-05-02 RX ADMIN — PANTOPRAZOLE SODIUM 40 MG: 40 TABLET, DELAYED RELEASE ORAL at 06:04

## 2019-05-02 RX ADMIN — FOLIC ACID 1 MG: 1 TABLET ORAL at 08:26

## 2019-05-02 RX ADMIN — METHYLPREDNISOLONE SODIUM SUCCINATE 40 MG: 40 INJECTION, POWDER, FOR SOLUTION INTRAMUSCULAR; INTRAVENOUS at 00:24

## 2019-05-02 RX ADMIN — HYDROCODONE BITARTRATE AND ACETAMINOPHEN 1 TABLET: 5; 325 TABLET ORAL at 08:40

## 2019-05-02 RX ADMIN — POLYETHYLENE GLYCOL 3350 17 G: 17 POWDER, FOR SOLUTION ORAL at 08:30

## 2019-05-02 RX ADMIN — AMLODIPINE BESYLATE 5 MG: 5 TABLET ORAL at 08:27

## 2019-05-02 RX ADMIN — IPRATROPIUM BROMIDE AND ALBUTEROL SULFATE 1 AMPULE: .5; 3 SOLUTION RESPIRATORY (INHALATION) at 13:48

## 2019-05-02 RX ADMIN — ACETAMINOPHEN 650 MG: 325 TABLET ORAL at 06:04

## 2019-05-02 ASSESSMENT — PAIN SCALES - GENERAL
PAINLEVEL_OUTOF10: 0
PAINLEVEL_OUTOF10: 5
PAINLEVEL_OUTOF10: 4
PAINLEVEL_OUTOF10: 3

## 2019-05-02 NOTE — PROGRESS NOTES
INPATIENT PROGRESS NOTES    PATIENT NAME: Munira Whitfield  MRN: [id number]  SERVICE DATE:  May 2, 2019   SERVICE TIME:  11:39 AM      PRIMARY SERVICE: Pulmonary Disease    CHIEF COMPLAIN: Shortness of breath      INTERVAL HPI: Patient seen and examined at bedside, Interval Notes, orders reviewed. Nursing notes noted  Patient had bronchoscopy and biopsy yesterday. No problem after bronchoscopy. Discuss with care coordinator. Patient wants to go home today. .  She does have oxygen at home. She will need 3-4 L O2 24-hour seven-day. She has some chest pain on her right side which is chronic. No hemoptysis. No nausea vomiting diarrhea abdominal pain. No fever or chills. OBJECTIVE    Body mass index is 19.84 kg/m². PHYSICAL EXAM:  Vitals:  /66   Pulse 97   Temp 97.9 °F (36.6 °C) (Oral)   Resp 18   Ht 5' 2\" (1.575 m)   Wt 108 lb 8 oz (49.2 kg)   SpO2 97%   BMI 19.84 kg/m²   General: Alert, awake . comfortable in bed, No distress. appears stated age and cooperative  Head: Atraumatic , Normocephalic   Eyes: PERRL. No sclera icterus. No conjunctival injection. No discharge   ENT: No nasal  discharge. Pharynx clear. lips, teeth, mucosa and gums are normal, tongue protrudes in the midline  Neck:  Trachea midline. No thyromegaly, no JVD, No cervical adenopathy. Chest : Bilaterally symmetrical ,Normal effort,  No accessory muscle use  Lung : . Fair BS bilateral, decreased BS at bases. No Rales. No wheezing. No rhonchi. No dullness on percussion. Heart[de-identified] Normal  rate. Regular rhythm. No mumur ,  Rub or gallop  ABD: Non-tender. Non-distended. No masses. No organmegaly. Normal bowel sounds. No hernia.   Ext : No Pitting both leg , No Cyanosis No clubbing  Neuro: no focal weakness          DATA:   Recent Labs     05/01/19 0625 05/02/19  0654   WBC 10.7 11.4*   HGB 10.0* 10.1*   HCT 29.1* 30.6*   MCV 99.7 100.5*    341     Recent Labs     05/01/19 0625 05/02/19  0654    138   K 4.3 4.2   CL 104 101   CO2 22 21   BUN 19 23   CREATININE 0.43* 0.53   GLUCOSE 102* 146*   CALCIUM 8.7 8.6   LABGLOM >60.0 >60.0   GFRAA >60.0 >60.0     O2 Device: Nasal cannula  O2 Flow Rate (L/min): 4 L/min    Dietary Nutrition Supplements: Standard High Calorie Oral Supplement  Dietary Nutrition Supplements: Clear Liquid Oral Supplement  DIET CARDIAC;     MEDICATIONS during current hospitalization:    Continuous Infusions:    Scheduled Meds:   ampicillin-sulbactam  1.5 g Intravenous Q6H    methylPREDNISolone  40 mg Intravenous Q12H    pantoprazole  40 mg Oral BID AC    acetaminophen  650 mg Oral 3 times per day    enoxaparin  40 mg Subcutaneous Daily    amLODIPine  5 mg Oral Daily    folic acid  1 mg Oral Daily    therapeutic multivitamin-minerals  1 tablet Oral Daily    sennosides-docusate sodium  2 tablet Oral Daily    tiotropium  18 mcg Inhalation Daily    ipratropium-albuterol  1 ampule Inhalation TID    aspirin  81 mg Oral Daily    sodium chloride flush  10 mL Intravenous 2 times per day    guaiFENesin  600 mg Oral BID    polyethylene glycol  17 g Oral Daily       PRN Meds:HYDROcodone 5 mg - acetaminophen, albuterol, sodium chloride flush, magnesium hydroxide, ondansetron    Radiology  Xr Chest Standard (2 Vw)    Result Date: 4/26/2019  EXAMINATION: XR CHEST (2 VW) CLINICAL HISTORY: Follow-up infiltrates COMPARISONS: March 26, 2019. April 19, 2019. April 24, 2019. FINDINGS: Frontal and lateral views of the chest were obtained. There are persisting bilateral infiltrates in a persisting smaller airspace opacities that have developed since March 26, 2019, were well depicted and well characterized on April 19, 2019 and have not changed since April 24, 2019. Individual pulmonary nodules are obscured and are now visible today. The heart has not enlarged. There is no appreciable pleural effusion. The mediastinum is not widened or shifted. The chest wall is unremarkable.  CONCLUSION: PERSISTING ATYPICAL BASILAR AND LOWER LOBE INFILTRATES. NO EFFUSION IS DEVELOPED. Xr Chest Standard (2 Vw)    Result Date: 4/24/2019  EXAMINATION: XR CHEST (2 VW) REASON FOR EXAM: Bibasilar interstitial pneumonitis FINDINGS: Reticular nodular infiltrate developing in the lower lung fields superimposed on chronic interstitial scarring and hyperinflation. Infiltrates are new since January 1, 2019. Infiltrates more extensive compared to March 26, 2019. Ill-defined 1.4  cm nodular density developing in the central right lung between the posterior sixth and seventh right ribs is new. Recent PET would suggest this is related to neoplasm. No new infiltrates in the upper lobes. Bones osteopenic but intact. BILATERAL INFILTRATES DEVELOPING SINCE THE FIRST OF THE YEAR AND WORSE SINCE THE STUDY ONE MONTH AGO. PROGRESSIVE INTERSTITIAL SPREAD OF TUMOR VERSUS ACUTE PNEUMONIA ARE THE TOP CONSIDERATIONS. 1.5 CM NODULAR MASS CENTRAL RIGHT LUNG SUSPICIOUS FOR NEOPLASM. Ct Head W Wo Contrast    Result Date: 4/3/2019  COMPARISON: No prior studies available for comparison. HISTORY: C34.2 Malignant neoplasm of middle lobe of right lung ([address] ICD10 TECHNIQUE: CT HEAD W WO CONTRAST FINDINGS: Ventricles and sulci are unremarkable in size for a patient this age. No areas of abnormal density, hemorrhage, enhancement or mass effect are seen in the brain. A large lytic area is seen in the posterior parietal region on the right that measures 1 cm. It appears well circumscribed and appears to be a lipoma. A 1.6 mm lytic area is seen on the inner table of the skull and the occipital region on the right (series 3, image 13). There are other tiny 3 to 4 mm lytic area is seen within the skull that are  thought to be vascular channels. In the right frontal region a 6 mm oval-shaped focus is seen series 3, image 19). No abnormal enhancement of the brain parenchyma.  In the skull there are multiple lucent area seen but most are thought to be vascular channels for emissary veins or arachnoid granulations. A larger area seen in the skull posterior appears to be fat. If patient is symptomatic consider MRI. All CT scans at this facility use dose modulation, iterative reconstruction, and/or weight based dosing when appropriate to reduce radiation dose to as low as reasonably achievable. Cta Chest W Wo Contrast    Result Date: 4/19/2019  EXAMINATION: CTA CHEST W WO CONTRAST  DATE AND TIME:4/19/2019 6:15 PM CLINICAL HISTORY: Acute chest pain. Shortness of breath  CP; hx lung cancer last chemo was 4/09/2019. CP with inspiration. PE vs pneumonia. Lungs clear on exam.  COMPARISON: None available. TECHNIQUE: Helical axial CTA of the chest was performed following the intravenous administration of 100 mL Optiray 320 intravenous contrast.  2D images were reconstructed in the axial and coronal planes. 3-D MIP images were generated in the coronal plane. Images were reviewed on the PACS workstation. All images including the 3D MIPS were permanently archived. All CT scans at this facility use dose modulation, iterative reconstruction, and/or weight based dosing when appropriate to reduce radiation dose to as low as reasonably achievable. FINDINGS:  Embolus: There is a small intraluminal filling defect involving a distal subsegmental pulmonary artery branch to the right lower lobe . Findings are consistent with a small embolus. No other sites of pulmonary embolus. The possibility of tumor  embolus as opposed to thrombotic embolus is questioned but differentiating these can be extremely difficult. There are bilateral irregular pulmonary nodules on a background of severe emphysema and nonspecific infiltrates at the lung bases. These infiltrates have developed since the PET scan examination of this patient on February 1, 2019. The possibility that some of these changes are related to lymphangitic spread of tumor is also questioned.                        Mediastinum: No other significant abnormality. No lymphadenopathy. No pericardial effusion. Visualized abdomen: Multiple hepatic cysts. Bones: No osseous abnormalities. SINGLE SMALL DISTAL SUBSEGMENTAL PULMONARY ARTERIAL EMBOLUS IN THE RIGHT LOWER LOBE. NO BIBASILAR INFILTRATES SINCE FEBRUARY 2019. SCATTERED METASTATIC LUNG NODULES AGAIN NOTED. THE POSSIBILITY THAT THIS SMALL EMBOLUS IS SECONDARY TO TUMOR IS  QUESTIONED. Xr Chest Portable    Result Date: 5/2/2019  EXAMINATION: Portable AP ERECT view of the chest. CLINICAL HISTORY:  post bronch LLL biopsy , history adenocarcinoma right middle lobe. DATE: 5/2/2019 6:00 AM COMPARISONS: 4/26/2019 FINDINGS: Normal cardiac silhouette. There are emphysematous changes in both lungs. Bibasilar infiltrates appear unchanged. There is a small left-sided pleural effusion. No evidence of pneumothorax. There are mild degenerative changes in spine. PERSISTENT BIBASILAR INFILTRATES, UNCHANGED. SUSPECT SMALL LEFT-SIDED PLEURAL EFFUSION. COPD. Ct Chest High Resolution    Result Date: 4/27/2019  CT of the Chest without intravenous contrast medium History:  Acute respiratory illness. Immunocompromised. Acute drug-induced interstitial pneumonia related to chemotherapy versus infection. COPD. Right lung base of adenocarcinoma, moderately well-differentiated. Technical Factors: CT imaging of the chest was obtained and formatted as 5 mm contiguous axial images from the thoracic inlet through the adrenal glands. Sagittal coronal reconstruction obtained during postprocessing. In addition, both prone and supine high-resolution imaging of the chest was obtained. Intravenous contrast medium:  None Comparison:  CTA chest, April 19, 2019, PET/CT, February 1, 2019, chest radiographs April 26, 2019, April 24, 2019. Findings: Right lung shows diffuse emphysematous change.  Pleural-based masslike area is identified posteriorly within the superior segment of the right lower lobe, and extending anteriorly abutting the major fissure, retracting and posteriorly (series 5, image 13). Diffuse honeycombing, right lung base. Interval development subsegmental consolidation anterior right lung base and lateral pleural-based right lung base. Bronchial wall thickening right lung base. Left lung shows diffuse emphysematous change. Scarring superior segment left lower lobe. Subsegmental consolidation left lung base. Bronchial wall thickening left lung base. Component of honeycombing identified at left lung base. Thoracic aorta normal in course and caliber. No hilar, mediastinal, or axillary lymph node enlargement. Limited imaging upper abdomen shows 1.8 x 1.6 cm cyst, anterior left hepatic lobe. Adrenal glands without anomaly. No osteoblastic, no osteolytic lesions. Marked bilateral diffuse emphysema. Bibasilar honeycombing, reflective of interstitial lung disease. Bibasilar subsegmental atelectasis/pneumonia. Bibasilar bronchiectasis. All CT scans at this facility use dose modulation, iterative reconstruction, and/or weight based dosing when appropriate to reduce radiation dose to as low as reasonably achievable. Fluoro For Surgical Procedures    Result Date: 5/1/2019  FLUORO FOR SURGICAL PROCEDURES : 5/1/2019 1:45 PM CLINICAL HISTORY:  Bronchoscope . COMPARISON: None available. Intraoperative fluoroscopy was provided for Dr. Angie Santiago procedure. A total of 83.4 seconds of fluoroscopy was used, with one fluoroscopic stills saved. No diagnostic images were obtained. Please see Dr. Angie Santiago surgical notes for completeness. Fluoroscopy Modified Barium Swallow With Video    Result Date: 4/29/2019  EXAMINATION:  MODIFIED BARIUM SWALLOW CLINICAL INFORMATION:  suspected aspiration  Dysphagia   TECHNIQUE: The patient was presented with various food and liquid consistencies mixed with barium for swallowing under fluoroscopic observation.  The study was performed in conjunction with a member of the Speech Pathology department and was video-recorded. Fluoroscopy time: 1.3 minutes Number of images: 9 fluoroscopic video loop recordings. FINDINGS: Oral phase: Within functional limits. Pharyngeal phase: Within functional limits. No laryngeal penetration or aspiration. See speech pathologist report for correlation. NORMAL MODIFIED BARIUM SWALLOW     IMPRESSION AND SUGGESTION:  · Emphysema with pulmonary fibrosis  · COPD exacerbation  · With possible bibasilar pneumonia versus lymphangitic spread of underlying malignancy or drug toxicity  · Possible chronic recurrent reflux disease with aspiration  · History of adenocarcinoma on chemotherapy Carboplatin, Alimta, and Keytruda. · Bibasilar bronchiectasis with retained secretions  · Chronic respiratory failure on 2 L oxygen at home    She had bronchoscopy and transbronchial biopsy done yesterday. She is currently on 4 L O2 via nasal cannula and O2 saturation 97%. Patient wants to go home today. Patient can be discharged with follow-up in office in 1 week. She will continue to use oxygen 3-4 L O2 via nasal cannula. Bronchodilator therapy at home as before. Will Discuss with Dr. Alfonso Aktins, about bronchoscopy results.   Discuss with the coordinator      Electronically signed by Paul Irwin MD, FCCP on 5/2/2019 at 11:39 AM

## 2019-05-02 NOTE — PROGRESS NOTES
[facility] Respiratory Therapy Evaluation   Current Order:  Duonbe tid albuterol q2 prn and vest Q8H      Home Regimen: tid      Ordering Physician: Ted Granger  Re-evaluation Date:  5/5     Diagnosis: PE      Patient Status: Stable / Unstable + Physician notified    The following MDI Criteria must be met in order to convert aerosol to MDI with spacer.  If unable to meet, MDI will be converted to aerosol:  []  Patient able to demonstrate the ability to use MDI effectively  []  Patient alert and cooperative  []  Patient able to take deep breath with 5-10 second hold  []  Medication(s) available in this delivery method   []  Peak flow greater than or equal to 200 ml/min            Current Order Substituted To  (same drug, same frequency)   Aerosol to MDI [] Albuterol Sulfate 0.083% unit dose by aerosol Albuterol Sulfate MDI 2 puffs by inhalation with spacer    [] Levalbuterol 1.25 mg unit dose by aerosol Levalbuterol MDI 2 puffs by inhalation with spacer    [] Levalbuterol 0.63 mg unit dose by aerosol Levalbuterol MDI 2 puffs by inhalation with spacer    [] Ipratropium Bromide 0.02% unit dose by aerosol Ipratropium Bromide MDI 2 puffs by inhalation with spacer    [] Duoneb (Ipratropium + Albuterol) unit dose by aerosol Ipratropium MDI + Albuterol MDI 2 puffs by inhalation w/spacer   MDI to Aerosol [] Albuterol Sulfate MDI Albuterol Sulfate 0.083% unit dose by aerosol    [] Levalbuterol MDI 2 puffs by inhalation Levalbuterol 1.25 mg unit dose by aerosol    [] Ipratropium Bromide MDI by inhalation Ipratropium Bromide 0.02% unit dose by aerosol    [] Combivent (Ipratropium + Albuterol) MDI by inhalation Duoneb (Ipratropium + Albuterol) unit dose by aerosol   Treatment Assessment [Frequency/Schedule]:  Change frequency to: no changes home freq vest tid __________________________________________________per Protocol, P&T, MEC      Points 0 1 2 3 4   Pulmonary Status  Non-Smoker  []   Smoking history   < 20 pack years  [] Smoking history  ?  20 pack years  []   Pulmonary Disorder  (acute or chronic)  [x]   Severe or Chronic w/ Exacerbation  []     Surgical Status No [x]   Surgeries     General []   Surgery Lower []   Abdominal Thoracic or []   Upper Abdominal Thoracic with  PulmonaryDisorder  []     Chest X-ray Clear/Not  Ordered     []  Chronic Changes  Results Pending  [x]  Infiltrates, atelectasis, pleural effusion, or edema  []  Infiltrates in more than one lobe []  Infiltrate + Atelectasis, &/or pleural effusion  []    Respiratory Pattern Regular,  RR = 12-20 [x]  Increased,  RR = 21-25 []  [place], irregular,  or RR = 26-30 []  Decreased FEV1  or RR = 31-35 []  Severe SOB, use  of accessory muscles, or RR ? 35  []    Mental Status Alert, oriented,  Cooperative [x]  Confused but Follows commands []  Lethargic or unable to follow commands []  Obtunded  []  Comatose  []    Breath Sounds Clear to  auscultation  []  Decreased unilaterally or  in bases only []  Decreased  bilaterally  [x]  Crackles or intermittent wheezes []  Wheezes []    Cough Strong, Spontan., & nonproductive [x]  Strong,  spontaneous, &  productive []  Weak,  Nonproductive []  Weak, productive or  with wheezes []  No spontaneous  cough or may require suctioning []    Level of Activity Ambulatory [x]  Ambulatory w/ Assist  []  Non-ambulatory []  Paraplegic []  Quadriplegic []    Total    Score:___6____     Triage Score:___4_____      Tri       Triage:     1. (>20) Freq: Q3    2. (16-20) Freq: Q4   3. (11-15) Freq: QID & Albuterol Q2 PRN    4. (6-10) Freq: TID & Albuterol Q2 PRN    5. (0-5) Freq Q4prn

## 2019-05-02 NOTE — PROGRESS NOTES
Hospitalist Progress Note      PCP: DERECK Gregorio    Date of Admission: 4/19/2019    Chief Complaint:    Chief Complaint   Patient presents with    Shortness of Breath     Subjective: :  Still sob but feels she can breathe little better  Wants to go home today  Had bronchoscopy yesterday      Medications:  Reviewed    Infusion Medications   Scheduled Medications    ampicillin-sulbactam  1.5 g Intravenous Q6H    methylPREDNISolone  40 mg Intravenous Q12H    pantoprazole  40 mg Oral BID AC    acetaminophen  650 mg Oral 3 times per day    enoxaparin  40 mg Subcutaneous Daily    amLODIPine  5 mg Oral Daily    folic acid  1 mg Oral Daily    therapeutic multivitamin-minerals  1 tablet Oral Daily    sennosides-docusate sodium  2 tablet Oral Daily    tiotropium  18 mcg Inhalation Daily    ipratropium-albuterol  1 ampule Inhalation TID    aspirin  81 mg Oral Daily    sodium chloride flush  10 mL Intravenous 2 times per day    guaiFENesin  600 mg Oral BID    polyethylene glycol  17 g Oral Daily     PRN Meds: HYDROcodone 5 mg - acetaminophen, albuterol, sodium chloride flush, magnesium hydroxide, ondansetron      Intake/Output Summary (Last 24 hours) at 5/2/2019 0956  Last data filed at 5/2/2019 0230  Gross per 24 hour   Intake 1540 ml   Output --   Net 1540 ml       Exam:    /66   Pulse 97   Temp 97.9 °F (36.6 °C) (Oral)   Resp 18   Ht 5' 2\" (1.575 m)   Wt 108 lb 8 oz (49.2 kg)   SpO2 98%   BMI 19.84 kg/m²     General appearance: No apparent distress  HEENT:  Conjunctivae/corneas clear. Neck: Supple, with full range of motion  Respiratory:  Normal respiratory effort. bibasal crackles.   Cardiovascular: Regular rate and rhythm with normal S1/S2 without murmurs, rubs or gallops. Abdomen: Soft, non-tender, non-distended with normal bowel sounds. Musculoskeletal: No clubbing, cyanosis or edema bilaterally. Skin: Skin color, texture, turgor normal.  No rashes or lesions.   Neuro: moving FOR NEOPLASM. CTA CHEST W WO CONTRAST   Final Result   SINGLE SMALL DISTAL SUBSEGMENTAL PULMONARY ARTERIAL EMBOLUS IN THE RIGHT LOWER LOBE. NO BIBASILAR INFILTRATES SINCE FEBRUARY 2019. SCATTERED METASTATIC LUNG NODULES AGAIN NOTED. THE POSSIBILITY THAT THIS SMALL EMBOLUS IS SECONDARY TO TUMOR IS    QUESTIONED. Assessment/Plan:    Active Hospital Problems    Diagnosis Date Noted    Pulmonary fibrosis ([address] [J84.10]     Severe malnutrition ([address] [E43] 04/20/2019    Pulmonary embolism on right ([address] [I26.99] 04/19/2019    Anemia [D64.9] 04/19/2019    B12 deficiency [E53.8] 02/15/2019    Secondary malignant neoplasm of left lung ([address] [C78.02] 01/24/2019    Malignant neoplasm of middle lobe of right lung ([address] [C34.2] 01/24/2019    Adenocarcinoma of right lung (HCC) [C34.91] 01/17/2019    Tobacco abuse [Z72.0]     COPD (chronic obstructive pulmonary disease) ([address] [J44.9] 01/02/2019    COPD exacerbation ([address] [J44.1] 01/01/2019       76 yo female with history of lung cancer, COPD, pulmonary fibrosis, sever malnutrition who presented with sob.      Dyspnea, acute on chronic hypoxic respiratory failure on 2l home O2  - 2/2 COPD exacerbation and possible acute drug induced interstitial pneumonitis or possible infection  - on solumedrol, duonebs  - bronchoscopy done yesterday. - on unasyn  - MBS normal  - pulmonary cleared patient for discharge. Follow up in pulm clinic     PE   - on prophylactic lovenox only per oncology recommendation as PE is incidental finding and too small to cause her symptoms.      Lung cancer with metastasis  - oncology following     Severe malnutrition - on ensure. Dietician following    Additional work up or/and treatment plan may be added today or then after based on clinical progression. I am managing a portion of pt care. Some medical issues are handled by other specialists.  Additional work up and treatment should be done in out pt setting by pt PCP and other out pt providers. In addition to examining and evaluating pt, I spent additional time explaining care, normal and abnormal findings, and treatment plan. All of pt questions were answered. Counseling, diet and education were  provided. Case will be discussed with nursing staff when appropriate. Family will be updated if and when appropriate.       Diet: Dietary Nutrition Supplements: Standard High Calorie Oral Supplement  Dietary Nutrition Supplements: Clear Liquid Oral Supplement  DIET CARDIAC;    Code Status: Full Code      Electronically signed by Brandon Junior MD on 5/2/2019 at 9:56 AM

## 2019-05-02 NOTE — DISCHARGE SUMMARY
steroid taper and follow up with oncology and pulmonary. Patient was seen by the following consultants while admitted to [facility]:   Consults:  Tod Vivar TO PULMONOLOGY  IP CONSULT TO PALLIATIVE CARE    Significant Diagnostic Studies:    Cta Chest W Wo Contrast    Result Date: 4/19/2019  EXAMINATION: CTA CHEST W WO CONTRAST  DATE AND TIME:4/19/2019 6:15 PM CLINICAL HISTORY: Acute chest pain. Shortness of breath  CP; hx lung cancer last chemo was 4/09/2019. CP with inspiration. PE vs pneumonia. Lungs clear on exam.  COMPARISON: None available. TECHNIQUE: Helical axial CTA of the chest was performed following the intravenous administration of 100 mL Optiray 320 intravenous contrast.  2D images were reconstructed in the axial and coronal planes. 3-D MIP images were generated in the coronal plane. Images were reviewed on the PACS workstation. All images including the 3D MIPS were permanently archived. All CT scans at this facility use dose modulation, iterative reconstruction, and/or weight based dosing when appropriate to reduce radiation dose to as low as reasonably achievable. FINDINGS:  Embolus: There is a small intraluminal filling defect involving a distal subsegmental pulmonary artery branch to the right lower lobe . Findings are consistent with a small embolus. No other sites of pulmonary embolus. The possibility of tumor  embolus as opposed to thrombotic embolus is questioned but differentiating these can be extremely difficult. There are bilateral irregular pulmonary nodules on a background of severe emphysema and nonspecific infiltrates at the lung bases. These infiltrates have developed since the PET scan examination of this patient on February 1, 2019. The possibility that some of these changes are related to lymphangitic spread of tumor is also questioned.                        Mediastinum: No other significant abnormality. No lymphadenopathy. No pericardial effusion. Visualized abdomen: Multiple hepatic cysts. Bones: No osseous abnormalities. SINGLE SMALL DISTAL SUBSEGMENTAL PULMONARY ARTERIAL EMBOLUS IN THE RIGHT LOWER LOBE. NO BIBASILAR INFILTRATES SINCE FEBRUARY 2019. SCATTERED METASTATIC LUNG NODULES AGAIN NOTED. THE POSSIBILITY THAT THIS SMALL EMBOLUS IS SECONDARY TO TUMOR IS  QUESTIONED. Discharge Medications:       [facility] Medication Instructions [id number]    Printed on:05/02/19 0149   Medication Information                      albuterol (PROVENTIL) (2.5 MG/3ML) 0.083% nebulizer solution  Take 3 mLs by nebulization every 6 hours as needed for Wheezing             amLODIPine (NORVASC) 5 MG tablet  Take 1 tablet by mouth daily             aspirin 81 MG chewable tablet  Take 1 tablet by mouth daily             folic acid (FOLVITE) 1 MG tablet  Take 1 tablet by mouth daily             guaiFENesin (MUCINEX) 600 MG extended release tablet  Take 1 tablet by mouth 2 times daily             magnesium hydroxide (MILK OF MAGNESIA) 400 MG/5ML suspension  Take 30 mLs by mouth daily as needed for Constipation             Multiple Vitamins-Minerals (CENTRUM SILVER 50+WOMEN) TABS  Take 1 tablet by mouth daily             pantoprazole (PROTONIX) 40 MG tablet  Take 1 tablet by mouth daily             predniSONE (DELTASONE) 10 MG tablet  Prednisone  40 mg dailyx3 days. Prednisone  30 mg dailyx3 days. Prednisone  20 mg dailyx3 days Prednisone  10 mg dailyx3 days             SENNA PO  Take 2 tablets by mouth nightly              tiotropium (SPIRIVA RESPIMAT) 2.5 MCG/ACT AERS inhaler  Inhale 2 puffs into the lungs daily             traMADol (ULTRAM) 50 MG tablet  Take 1 tablet by mouth every 4 hours as needed for Pain for up to 5 days. Intended supply: 5 days. Take lowest dose possible to manage pain                 Disposition:   Discharged to Home.  Any C needs that were indicated and/or required as been addressed and set up by Social Work. Condition at discharge: Pt was medically stable at the time of discharge. Significant improvement in clinical condition compared to initial condition at presentation to hospital    Activity: activity as tolerated, fall precautions. Total time taken for discharging this patient: 40 minutes. Greater than 70% of time was spent focused exclusively on this patient. Time was taken to review chart, discuss plans with consultants, reconciling medications, discussing plan answering questions with patient. Hiro Arlene  5/2/2019, 4:05 PM  ----------------------------------------------------------------------------------------------------------------------    Curtis Castanon,     Please return to ER or call 911 if you develop any significant signs or symptoms.     I may not have addressed all of your medical illnesses or the abnormal blood work or imaging therefore please ask your PCP, DERECK Gregorio ,  to obtain [facility] record to follow up on all of the abnormal labs, imaging and findings that I have and have not addressed during your hospitalization.      Discharging you from the hospital does not mean that your medical care ends here and now. You may still need additional work up, investigation, monitoring, and treatment to be handled from this point on by outside providers including your PCP, DERECK Gregorio , Specialists and other healthcare providers.      Please review your list of discharge medications prior to resuming medications you might still have at home, as the medications you need to be taking, dosages or how often you must take them may have changed. For medication questions, contact your retail pharmacy and your PCP, DERECK Gregorio .     ** I STRONGLY RECOMMEND that you follow up with DERECK Gregorio within 3 to 5 days for a post hospitalization evaluation.  This specific office visit is covered by your insurance, and is not the same as your annual doctor visit/ check up. This office visit is important, as it may prevent need for repeat and/or future hospitalizations. **    Your medical team at [facility] appreciates the opportunity to work with you to get well!     Sincerely,  Klarissa Marie

## 2019-05-04 LAB
CULTURE, RESPIRATORY: NORMAL
GRAM STAIN RESULT: NORMAL

## 2019-05-06 ENCOUNTER — TELEPHONE (OUTPATIENT)
Dept: PULMONOLOGY | Age: 74
End: 2019-05-06

## 2019-05-07 DIAGNOSIS — E03.2 HYPOTHYROIDISM DUE TO MEDICATION: ICD-10-CM

## 2019-05-07 DIAGNOSIS — C34.2 MALIGNANT NEOPLASM OF MIDDLE LOBE OF RIGHT LUNG (HCC): ICD-10-CM

## 2019-05-07 DIAGNOSIS — C78.02 SECONDARY MALIGNANT NEOPLASM OF LEFT LUNG (HCC): ICD-10-CM

## 2019-05-07 PROBLEM — R52 PAIN: Status: ACTIVE | Noted: 2019-05-07

## 2019-05-07 LAB
ALBUMIN SERPL-MCNC: 3.8 G/DL (ref 3.5–4.6)
ALP BLD-CCNC: 69 U/L (ref 40–130)
ALT SERPL-CCNC: 42 U/L (ref 0–33)
ANION GAP SERPL CALCULATED.3IONS-SCNC: 17 MEQ/L (ref 9–15)
AST SERPL-CCNC: 26 U/L (ref 0–35)
BILIRUB SERPL-MCNC: 0.4 MG/DL (ref 0.2–0.7)
BUN BLDV-MCNC: 17 MG/DL (ref 8–23)
CALCIUM SERPL-MCNC: 9.6 MG/DL (ref 8.5–9.9)
CHLORIDE BLD-SCNC: 98 MEQ/L (ref 95–107)
CO2: 21 MEQ/L (ref 20–31)
CREAT SERPL-MCNC: 0.56 MG/DL (ref 0.5–0.9)
GFR AFRICAN AMERICAN: >60
GFR NON-AFRICAN AMERICAN: >60
GLOBULIN: 2.9 G/DL (ref 2.3–3.5)
GLUCOSE BLD-MCNC: 93 MG/DL (ref 70–99)
POTASSIUM SERPL-SCNC: 4.4 MEQ/L (ref 3.4–4.9)
SODIUM BLD-SCNC: 136 MEQ/L (ref 135–144)
TOTAL PROTEIN: 6.7 G/DL (ref 6.3–8)
TSH SERPL DL<=0.05 MIU/L-ACNC: 0.81 UIU/ML (ref 0.44–3.86)

## 2019-05-08 ENCOUNTER — OFFICE VISIT (OUTPATIENT)
Dept: INTERNAL MEDICINE | Age: 74
End: 2019-05-08
Payer: MEDICARE

## 2019-05-08 VITALS
SYSTOLIC BLOOD PRESSURE: 112 MMHG | HEART RATE: 90 BPM | TEMPERATURE: 98.3 F | BODY MASS INDEX: 18.66 KG/M2 | OXYGEN SATURATION: 93 % | WEIGHT: 102 LBS | DIASTOLIC BLOOD PRESSURE: 60 MMHG

## 2019-05-08 DIAGNOSIS — C34.2 MALIGNANT NEOPLASM OF MIDDLE LOBE OF RIGHT LUNG (HCC): ICD-10-CM

## 2019-05-08 DIAGNOSIS — C78.02 SECONDARY MALIGNANT NEOPLASM OF LEFT LUNG (HCC): Primary | ICD-10-CM

## 2019-05-08 PROCEDURE — 1123F ACP DISCUSS/DSCN MKR DOCD: CPT | Performed by: PHYSICIAN ASSISTANT

## 2019-05-08 PROCEDURE — G8427 DOCREV CUR MEDS BY ELIG CLIN: HCPCS | Performed by: PHYSICIAN ASSISTANT

## 2019-05-08 PROCEDURE — G8400 PT W/DXA NO RESULTS DOC: HCPCS | Performed by: PHYSICIAN ASSISTANT

## 2019-05-08 PROCEDURE — 4040F PNEUMOC VAC/ADMIN/RCVD: CPT | Performed by: PHYSICIAN ASSISTANT

## 2019-05-08 PROCEDURE — 3017F COLORECTAL CA SCREEN DOC REV: CPT | Performed by: PHYSICIAN ASSISTANT

## 2019-05-08 PROCEDURE — 1111F DSCHRG MED/CURRENT MED MERGE: CPT | Performed by: PHYSICIAN ASSISTANT

## 2019-05-08 PROCEDURE — 1090F PRES/ABSN URINE INCON ASSESS: CPT | Performed by: PHYSICIAN ASSISTANT

## 2019-05-08 PROCEDURE — 1036F TOBACCO NON-USER: CPT | Performed by: PHYSICIAN ASSISTANT

## 2019-05-08 PROCEDURE — G8420 CALC BMI NORM PARAMETERS: HCPCS | Performed by: PHYSICIAN ASSISTANT

## 2019-05-08 PROCEDURE — 99213 OFFICE O/P EST LOW 20 MIN: CPT | Performed by: PHYSICIAN ASSISTANT

## 2019-05-08 ASSESSMENT — ENCOUNTER SYMPTOMS
WHEEZING: 0
CHEST TIGHTNESS: 0
SHORTNESS OF BREATH: 1
COUGH: 1

## 2019-05-08 NOTE — PROGRESS NOTES
2019     Dayana Layton (:  1945) is a 75 y.o. female, here for evaluation of the following medical concerns:    Chief Complaint   Patient presents with    Follow-Up from Hospital     Pt went to ER for lung procedure. Pt states all results are not in. Pt went to [facility]       HPI    Follow up after hospital procedure  States she has a bronchoscopy done by pulmonology   No results back yet  Doing well with oxygen 4 L around the clock , and current inhaler regimen   On Spiriva and albuterol  Her pain is controlled, written by oncology       Review of Systems   Constitutional: Negative for chills, fatigue and fever. Respiratory: Positive for cough and shortness of breath. Negative for chest tightness and wheezing. Cardiovascular: Negative for chest pain, palpitations and leg swelling. Prior to Visit Medications    Medication Sig Taking? Authorizing Provider   OXYGEN Inhale 4 L into the lungs daily Yes Historical Provider, MD   HYDROcodone-acetaminophen (NORCO) 5-325 MG per tablet Take 1 tablet by mouth every 4 hours as needed for Pain for up to 10 days. Intended supply: 7 days. Take lowest dose possible to manage pain Yes Chelsey Combs,    aspirin 81 MG chewable tablet Take 1 tablet by mouth daily Yes Lonell Schlatter, MD   guaiFENesin (MUCINEX) 600 MG extended release tablet Take 1 tablet by mouth 2 times daily Yes Lonell Schlatter, MD   pantoprazole (PROTONIX) 40 MG tablet Take 1 tablet by mouth daily Yes Lonell Schlatter, MD   predniSONE (DELTASONE) 10 MG tablet Prednisone  40 mg dailyx3 days. Prednisone  30 mg dailyx3 days.  Prednisone  20 mg dailyx3 days Prednisone  10 mg dailyx3 days Yes Rebecca Chavez MD   SENNA PO Take 2 tablets by mouth nightly  Yes Historical Provider, MD   albuterol (PROVENTIL) (2.5 MG/3ML) 0.083% nebulizer solution Take 3 mLs by nebulization every 6 hours as needed for Wheezing Yes Lynne Easley,    magnesium hydroxide (MILK OF MAGNESIA) 400 MG/5ML suspension Take 30 mLs by mouth daily as needed for Constipation Yes Historical Provider, MD   folic acid (FOLVITE) 1 MG tablet Take 1 tablet by mouth daily Yes [place] Combs, DO   Multiple Vitamins-Minerals (CENTRUM SILVER 50+WOMEN) TABS Take 1 tablet by mouth daily Yes Historical Provider, MD   tiotropium (SPIRIVA RESPIMAT) 2.5 MCG/ACT AERS inhaler Inhale 2 puffs into the lungs daily Yes Mata Maciel MD   amLODIPine (NORVASC) 5 MG tablet Take 1 tablet by mouth daily Yes Jaime Nair MD        Social History     Tobacco Use    Smoking status: Former Smoker     Packs/day: 1.00     Years: 60.00     Pack years: 60.00     Types: Cigarettes     Start date: 1/10/1964     Last attempt to quit: 2019     Years since quittin.3    Smokeless tobacco: Never Used    Tobacco comment: quit in 2019   Substance Use Topics    Alcohol use: Not Currently     Alcohol/week: 9.0 oz     Types: 15 Cans of beer per week     Comment: per pt has not had any since Dec 28 2018        Vitals:    19 1008   BP: 112/60   Site: Left Upper Arm   Position: Sitting   Cuff Size: Medium Adult   Pulse: 90   Temp: 98.3 °F (36.8 °C)   TempSrc: Temporal   SpO2: 93%  Comment: 4l o2   Weight: 102 lb (46.3 kg)     Estimated body mass index is 18.66 kg/m² as calculated from the following:    Height as of 19: 5' 2\" (1.575 m). Weight as of this encounter: 102 lb (46.3 kg). Physical Exam   Constitutional: She appears well-developed and well-nourished. HENT:   Head: Normocephalic and atraumatic. Cardiovascular: Normal rate, regular rhythm and normal heart sounds. Pulmonary/Chest: Effort normal. She has decreased breath sounds. She has no wheezes. She has no rhonchi. She has no rales. Vitals reviewed. ASSESSMENT/PLAN:  1. Secondary malignant neoplasm of left lung ([address]  2. Malignant neoplasm of middle lobe of right lung (HCC)  - pain controlled  - awaiting results  - seeing oncology       No follow-ups on file.     An

## 2019-05-23 RX ORDER — AMLODIPINE BESYLATE 5 MG/1
5 TABLET ORAL DAILY
Qty: 30 TABLET | Refills: 3 | Status: SHIPPED | OUTPATIENT
Start: 2019-05-23

## 2019-05-23 NOTE — TELEPHONE ENCOUNTER
DMW requesting medication refill.  Please approve or deny this request.    Rx requested:  Requested Prescriptions     Pending Prescriptions Disp Refills    amLODIPine (NORVASC) 5 MG tablet 30 tablet 3     Sig: Take 1 tablet by mouth daily         Last Office Visit:   5/8/2019      Next Visit Date:  Future Appointments   Date Time Provider Cuco Baker   6/5/2019  2:30 PM KRISSY Lozoya - CNS [facility]   6/27/2019  9:40 AM DO AKUA Jj HEMONC AFL HEMONC E   7/8/2019  9:15 AM Jonathan Carmona MD [facility]

## 2019-06-01 LAB
FINAL REPORT: NORMAL
PRELIMINARY: NORMAL

## 2019-06-05 ENCOUNTER — OFFICE VISIT (OUTPATIENT)
Dept: PALLATIVE CARE | Age: 74
End: 2019-06-05
Payer: MEDICARE

## 2019-06-05 VITALS
HEART RATE: 95 BPM | BODY MASS INDEX: 18.66 KG/M2 | RESPIRATION RATE: 18 BRPM | OXYGEN SATURATION: 98 % | SYSTOLIC BLOOD PRESSURE: 122 MMHG | WEIGHT: 102 LBS | DIASTOLIC BLOOD PRESSURE: 78 MMHG

## 2019-06-05 DIAGNOSIS — R06.02 SHORTNESS OF BREATH: Primary | ICD-10-CM

## 2019-06-05 DIAGNOSIS — C34.2 MALIGNANT NEOPLASM OF MIDDLE LOBE OF RIGHT LUNG (HCC): ICD-10-CM

## 2019-06-05 DIAGNOSIS — R07.89 CHEST PAIN, MID STERNAL: ICD-10-CM

## 2019-06-05 DIAGNOSIS — J44.9 CHRONIC OBSTRUCTIVE PULMONARY DISEASE, UNSPECIFIED COPD TYPE (HCC): ICD-10-CM

## 2019-06-05 DIAGNOSIS — R53.0 NEOPLASTIC (MALIGNANT) RELATED FATIGUE: ICD-10-CM

## 2019-06-05 DIAGNOSIS — Z51.5 PALLIATIVE CARE BY SPECIALIST: ICD-10-CM

## 2019-06-05 DIAGNOSIS — C78.02 SECONDARY MALIGNANT NEOPLASM OF LEFT LUNG (HCC): ICD-10-CM

## 2019-06-05 DIAGNOSIS — K59.01 SLOW TRANSIT CONSTIPATION: ICD-10-CM

## 2019-06-05 PROCEDURE — 3017F COLORECTAL CA SCREEN DOC REV: CPT | Performed by: CLINICAL NURSE SPECIALIST

## 2019-06-05 PROCEDURE — 99497 ADVNCD CARE PLAN 30 MIN: CPT | Performed by: CLINICAL NURSE SPECIALIST

## 2019-06-05 PROCEDURE — G8420 CALC BMI NORM PARAMETERS: HCPCS | Performed by: CLINICAL NURSE SPECIALIST

## 2019-06-05 PROCEDURE — 1123F ACP DISCUSS/DSCN MKR DOCD: CPT | Performed by: CLINICAL NURSE SPECIALIST

## 2019-06-05 PROCEDURE — 1090F PRES/ABSN URINE INCON ASSESS: CPT | Performed by: CLINICAL NURSE SPECIALIST

## 2019-06-05 PROCEDURE — 1036F TOBACCO NON-USER: CPT | Performed by: CLINICAL NURSE SPECIALIST

## 2019-06-05 PROCEDURE — 99343 PR HOME VST NEW PATIENT MOD-HI SEVERITY 45 MINUTES: CPT | Performed by: CLINICAL NURSE SPECIALIST

## 2019-06-05 PROCEDURE — 4040F PNEUMOC VAC/ADMIN/RCVD: CPT | Performed by: CLINICAL NURSE SPECIALIST

## 2019-06-05 RX ORDER — HYDROCODONE BITARTRATE AND ACETAMINOPHEN 5; 325 MG/1; MG/1
1 TABLET ORAL EVERY 6 HOURS PRN
COMMUNITY
End: 2019-06-21

## 2019-06-05 RX ORDER — POLYETHYLENE GLYCOL 3350 17 G/17G
17 POWDER, FOR SOLUTION ORAL DAILY
COMMUNITY

## 2019-06-05 ASSESSMENT — ENCOUNTER SYMPTOMS
CHEST TIGHTNESS: 1
SHORTNESS OF BREATH: 1
CONSTIPATION: 1
EYES NEGATIVE: 1

## 2019-06-05 NOTE — PROGRESS NOTES
Subjective:      Patient Id: Seen James Mendes at  home in [place], for initial palliative medicine consult for symptom management, advance care planning and goals of care discussion. My recommendations/treatment plans will be communicated to the ordering provider through the shared medical record/via fax. Chief Complaint   Patient presents with    Shortness of Breath    Fatigue    Constipation      HPI    She presents to the appointment with: her significant other. James Mendes has complex medical history that includes bilateral lung CA diagnosed in January of this year, and COPD. She was on immunotherapy and chemotherapy until she developed PNA/pneumonitis and pulmonary embolism which resulted to prolonged hospitalization in mid April. Her cancer treatment is pending further recovery. She is to follow up with hemoc on 6/27. Today, she is complaining of HENDERSON. This is improving from her pre hospitalization status. She has just finished steroid taper and is currently on daily prednisone regimen for the management of her COPD. She has not had any respiratory crisis since her discharge. She continues to experience fatigue. Again, this is improved over the past couple of weeks but not yet to her baseline. She is frequently constipated, described as waiting days before a hard bowel movement. This is improved since adding miralax with senokot. Patient has midsternal chest pain that is described as moderate at rest but worse with deep breathing. This started just prior to recent hospitalization. She attains minimal to moderate relief with norco. She does not want to take norco routinely. Takes tylenol routinely with minimal relief.       Past Medical History:   Diagnosis Date    COPD [facility] obstructive [facility] disease) [facility]     Lung cancer ([facility]      Past Surgical History:   Procedure Laterality Date    APPENDECTOMY      BRONCHOSCOPY N/A 5/1/2019    BRONCHOSCOPY performed by Hugo Ramirez MD at [address] [place] [address] HYSTERECTOMY      TONSILLECTOMY       Social History     Socioeconomic History    Marital status:      Spouse name: Not on file    Number of children: Not on file    Years of education: Not on file    Highest education level: Not on file   Occupational History    Not on file   Social Needs    Financial resource strain: Not on file    Food insecurity:     Worry: Not on file     Inability: Not on file    Transportation needs:     Medical: Not on file     Non-medical: Not on file   Tobacco Use    Smoking status: Former Smoker     Packs/day: 1.00     Years: 60.00     Pack years: 60.00     Types: Cigarettes     Start date: 1/10/1964     Last attempt to quit: 2019     Years since quittin.4    Smokeless tobacco: Never Used    Tobacco comment: quit in 2019   Substance and Sexual Activity    Alcohol use: Not Currently     Alcohol/week: 9.0 oz     Types: 15 Cans of beer per week     Comment: per pt has not had any since Dec 28 2018    Drug use: No    Sexual activity: Not on file   Lifestyle    Physical activity:     Days per week: Not on file     Minutes per session: Not on file    Stress: Not on file   Relationships    Social connections:     Talks on phone: Not on file     Gets together: Not on file     Attends Jain service: Not on file     Active member of club or organization: Not on file     Attends meetings of clubs or organizations: Not on file     Relationship status: Not on file    Intimate partner violence:     Fear of current or ex partner: Not on file     Emotionally abused: Not on file     Physically abused: Not on file     Forced sexual activity: Not on file   Other Topics Concern    Not on file   Social History Narrative    Not on file     Family History   Problem Relation Age of Onset    Stroke Mother     Emphysema Father      Allergies   Allergen Reactions    Levaquin [Levofloxacin In D5w] Itching and Rash     Current Outpatient Medications on File Prior to (DELTASONE) 10 MG tablet Therapy completed        - Advance Care Planning   We discussed Living Will (LW), and [facility]) as well as their activation. Patient choice discussed. LW/HCPOA in place No;Tasked  for assistance with completing paperwork Yes; Code status discussed Yes; signed No. Code Full code. All related questions were answered completely. - Goals of Care Discussion:  Disease process and goals of treatment were discussed in basic terms. Brittney's goal is to optimize available comfort care measures. We discussed the palliative care philosophy in light of those goals. We discussed all care options contingent on treatment response and QOL. Much active listening, presence, and emotional support were given. Discussed with patient/surrogate: POC, Treatment risks/benefits, and alternatives. All questions were answered. Additionally, a printed copy of the CDC medications/opioid safety informational sheet was reviewed and given to patient for reference. Opioid contract signed:Yes    Due to acuity, symptomatology and high-risk medication management, I advised patient to No follow-ups on file. Thanks for the opportunity you have allowed us to provide palliative care to [place]. We will be in touch as care progresses. Please feel free to reach out to us should you have any questions or requests.     Total Time 65 mins (Adv. Care planning 16 mins)   > 50% Time Spent Counseling/Care coordination yes -        KRISSY Jama - CNS, BC, ACHPN, MS

## 2019-06-06 ENCOUNTER — OFFICE VISIT (OUTPATIENT)
Dept: PALLATIVE CARE | Age: 74
End: 2019-06-06

## 2019-06-06 DIAGNOSIS — C34.91 ADENOCARCINOMA OF RIGHT LUNG (HCC): Primary | ICD-10-CM

## 2019-06-06 PROBLEM — R52 PAIN: Status: RESOLVED | Noted: 2019-05-07 | Resolved: 2019-06-06

## 2019-06-06 NOTE — PROGRESS NOTES
Visit made on this date. Rach assisted patient in completing [address] and Living Will. Patient also had questions regarding Medicaid. Sw reviewed her papework and encouraged her to physically take the needed documents in to the [facility] and request receipts.

## 2019-06-21 ENCOUNTER — OFFICE VISIT (OUTPATIENT)
Dept: PALLATIVE CARE | Age: 74
End: 2019-06-21
Payer: MEDICARE

## 2019-06-21 VITALS
HEART RATE: 124 BPM | TEMPERATURE: 97.4 F | OXYGEN SATURATION: 92 % | SYSTOLIC BLOOD PRESSURE: 113 MMHG | RESPIRATION RATE: 16 BRPM | DIASTOLIC BLOOD PRESSURE: 56 MMHG

## 2019-06-21 DIAGNOSIS — J44.9 CHRONIC OBSTRUCTIVE PULMONARY DISEASE, UNSPECIFIED COPD TYPE (HCC): Primary | ICD-10-CM

## 2019-06-21 DIAGNOSIS — Z51.5 PALLIATIVE CARE PATIENT: ICD-10-CM

## 2019-06-21 DIAGNOSIS — G89.3 NEOPLASM RELATED PAIN: ICD-10-CM

## 2019-06-21 DIAGNOSIS — C34.90 MALIGNANT NEOPLASM OF LUNG, UNSPECIFIED LATERALITY, UNSPECIFIED PART OF LUNG (HCC): ICD-10-CM

## 2019-06-21 PROCEDURE — G8400 PT W/DXA NO RESULTS DOC: HCPCS | Performed by: NURSE PRACTITIONER

## 2019-06-21 PROCEDURE — 4040F PNEUMOC VAC/ADMIN/RCVD: CPT | Performed by: NURSE PRACTITIONER

## 2019-06-21 PROCEDURE — 3017F COLORECTAL CA SCREEN DOC REV: CPT | Performed by: NURSE PRACTITIONER

## 2019-06-21 PROCEDURE — 1090F PRES/ABSN URINE INCON ASSESS: CPT | Performed by: NURSE PRACTITIONER

## 2019-06-21 PROCEDURE — G8420 CALC BMI NORM PARAMETERS: HCPCS | Performed by: NURSE PRACTITIONER

## 2019-06-21 PROCEDURE — 1123F ACP DISCUSS/DSCN MKR DOCD: CPT | Performed by: NURSE PRACTITIONER

## 2019-06-21 PROCEDURE — 1036F TOBACCO NON-USER: CPT | Performed by: NURSE PRACTITIONER

## 2019-06-21 PROCEDURE — G8427 DOCREV CUR MEDS BY ELIG CLIN: HCPCS | Performed by: NURSE PRACTITIONER

## 2019-06-21 PROCEDURE — 99214 OFFICE O/P EST MOD 30 MIN: CPT | Performed by: NURSE PRACTITIONER

## 2019-06-21 PROCEDURE — 3023F SPIROM DOC REV: CPT | Performed by: NURSE PRACTITIONER

## 2019-06-21 PROCEDURE — G8926 SPIRO NO PERF OR DOC: HCPCS | Performed by: NURSE PRACTITIONER

## 2019-06-21 RX ORDER — DOXYCYCLINE HYCLATE 100 MG/1
100 CAPSULE ORAL 2 TIMES DAILY
Qty: 20 CAPSULE | Refills: 1 | Status: SHIPPED | OUTPATIENT
Start: 2019-06-21 | End: 2019-07-01

## 2019-06-21 RX ORDER — IPRATROPIUM BROMIDE AND ALBUTEROL SULFATE 2.5; .5 MG/3ML; MG/3ML
1 SOLUTION RESPIRATORY (INHALATION) EVERY 6 HOURS PRN
COMMUNITY

## 2019-06-21 RX ORDER — PREDNISONE 10 MG/1
10 TABLET ORAL DAILY
Qty: 30 TABLET | Refills: 1 | Status: ON HOLD | OUTPATIENT
Start: 2019-06-21 | End: 2019-07-08 | Stop reason: HOSPADM

## 2019-06-21 RX ORDER — HYDROCODONE BITARTRATE AND ACETAMINOPHEN 5; 325 MG/1; MG/1
1 TABLET ORAL EVERY 6 HOURS PRN
Qty: 120 TABLET | Refills: 0 | Status: ON HOLD | OUTPATIENT
Start: 2019-06-21 | End: 2019-07-23 | Stop reason: HOSPADM

## 2019-06-21 NOTE — PROGRESS NOTES
Subjective:      Patient Id: Carlos Michelle is a 75 y.o. female who presents today with   Chief Complaint   Patient presents with    Shortness of Breath    Pain          HPI  Seen at [facility] for symptom management rt COPD and lung cancer. Positive increased SOB the last week, she was exposed to repiratory infection. She is primarily using acetaminophen 650 mg po every 8 hours for pain in the chest area, she never picked up the last Norco prescription as she did not realize it was being refilled. No GI or  complaints, no gisella blood or urine in stool. Negative significant emotional, spiritual, or sleep disturbance.     Past Medical History:   Diagnosis Date    COPD (chronic obstructive pulmonary disease) ([facility] [address]     Lung cancer (HCC)      Past Surgical History:   Procedure Laterality Date    APPENDECTOMY      BRONCHOSCOPY N/A 2019    BRONCHOSCOPY performed by Sherin Mfcarland MD at [address]       Social History     Socioeconomic History    Marital status:      Spouse name: Not on file    Number of children: Not on file    Years of education: Not on file    Highest education level: Not on file   Occupational History    Not on file   Social Needs    Financial resource strain: Not on file    Food insecurity:     Worry: Not on file     Inability: Not on file    Transportation needs:     Medical: Not on file     Non-medical: Not on file   Tobacco Use    Smoking status: Former Smoker     Packs/day: 1.00     Years: 60.00     Pack years: 60.00     Types: Cigarettes     Start date: 1/10/1964     Last attempt to quit: 2019     Years since quittin.4    Smokeless tobacco: Never Used    Tobacco comment: quit in 2019   Substance and Sexual Activity    Alcohol use: Not Currently     Alcohol/week: 9.0 oz     Types: 15 Cans of beer per week     Comment: per pt has not had any since Dec 28 2018    Drug use: No    Sexual activity: Not on file   Lifestyle     Multiple Vitamins-Minerals (CENTRUM SILVER 50+WOMEN) TABS Take 1 tablet by mouth daily      tiotropium (SPIRIVA RESPIMAT) 2.5 MCG/ACT AERS inhaler Inhale 2 puffs into the lungs daily 1 Inhaler 3     No current facility-administered medications on file prior to visit. Review of Systems      Objective:   BP (!) 113/56   Pulse 124   Temp 97.4 °F (36.3 °C)   Resp 16   SpO2 92%     Physical Exam      Assessment:       Diagnosis Orders   1. Chronic obstructive pulmonary disease, unspecified COPD type (HCC)  HYDROcodone-acetaminophen (NORCO) 5-325 MG per tablet   2. Malignant neoplasm of lung, unspecified laterality, unspecified part of lung (HCC)  HYDROcodone-acetaminophen (NORCO) 5-325 MG per tablet   3. Palliative care patient  HYDROcodone-acetaminophen (NORCO) 5-325 MG per tablet   4. Neoplasm related pain            Plan:      SOB  - Norco can be used prn SOB  - Start Doxy and prednisone taper  - Continue COPD Directed therapies   - Call for increased SOB, cough, sputum production, excessive fatigue, fever, chills, or myalgias  - Gentle exercise as tolerated  -  Rinse out mouth after inhaler use. - COPD ER PACK in place: Call prior to initializing      Pain related to neoplasm  - Contine current POC  - Heat or ice to painful areas, whichever is preferred  - Gentle ROM exercises  Pt is tolerating current pain meds without adverse effects or over sedation. Lowest effective dose used. Pt advised to call and notify palliative care for any adverse effects or sedation  Pt is able to maintain adequate functional level and participate in ADLs  OARRS reviewed. There is no indication of aberrant behavior  Pt advised to call for increasing or uncontrolled pain  Risk vs benefit assessed. Pt educated on risk of addiction.    Pt advised to take only as prescribed and not to change frequency of pain meds without consulting palliative care first.    Myke Foy, APRN - CNP

## 2019-06-25 ENCOUNTER — HOSPITAL ENCOUNTER (OUTPATIENT)
Age: 74
End: 2019-06-25
Payer: MEDICARE

## 2019-06-25 ENCOUNTER — HOSPITAL ENCOUNTER (OUTPATIENT)
Age: 74
Discharge: HOME | End: 2019-06-25
Payer: MEDICARE

## 2019-06-25 ENCOUNTER — OFFICE VISIT (OUTPATIENT)
Dept: INTERNAL MEDICINE | Age: 74
End: 2019-06-25
Payer: MEDICARE

## 2019-06-25 VITALS
OXYGEN SATURATION: 93 % | SYSTOLIC BLOOD PRESSURE: 104 MMHG | TEMPERATURE: 98.1 F | DIASTOLIC BLOOD PRESSURE: 60 MMHG | BODY MASS INDEX: 19.92 KG/M2 | HEART RATE: 112 BPM | HEIGHT: 60 IN

## 2019-06-25 DIAGNOSIS — R00.2 HEART PALPITATIONS: Primary | ICD-10-CM

## 2019-06-25 DIAGNOSIS — R07.9 CHEST PAIN IN ADULT: ICD-10-CM

## 2019-06-25 DIAGNOSIS — R06.02 SOB (SHORTNESS OF BREATH): ICD-10-CM

## 2019-06-25 LAB
EKG ATRIAL RATE: 96 BPM
EKG P AXIS: 58 DEGREES
EKG P-R INTERVAL: 130 MS
EKG Q-T INTERVAL: 358 MS
EKG QRS DURATION: 80 MS
EKG QTC CALCULATION (BAZETT): 452 MS
EKG R AXIS: -66 DEGREES
EKG T AXIS: 40 DEGREES
EKG VENTRICULAR RATE: 96 BPM

## 2019-06-25 PROCEDURE — 3017F COLORECTAL CA SCREEN DOC REV: CPT | Performed by: PHYSICIAN ASSISTANT

## 2019-06-25 PROCEDURE — 4040F PNEUMOC VAC/ADMIN/RCVD: CPT | Performed by: PHYSICIAN ASSISTANT

## 2019-06-25 PROCEDURE — 1123F ACP DISCUSS/DSCN MKR DOCD: CPT | Performed by: PHYSICIAN ASSISTANT

## 2019-06-25 PROCEDURE — 1036F TOBACCO NON-USER: CPT | Performed by: PHYSICIAN ASSISTANT

## 2019-06-25 PROCEDURE — G8427 DOCREV CUR MEDS BY ELIG CLIN: HCPCS | Performed by: PHYSICIAN ASSISTANT

## 2019-06-25 PROCEDURE — 93005 ELECTROCARDIOGRAM TRACING: CPT | Performed by: PHYSICIAN ASSISTANT

## 2019-06-25 PROCEDURE — 99214 OFFICE O/P EST MOD 30 MIN: CPT | Performed by: PHYSICIAN ASSISTANT

## 2019-06-25 PROCEDURE — 93005 ELECTROCARDIOGRAM TRACING: CPT

## 2019-06-25 PROCEDURE — G8420 CALC BMI NORM PARAMETERS: HCPCS | Performed by: PHYSICIAN ASSISTANT

## 2019-06-25 PROCEDURE — 1090F PRES/ABSN URINE INCON ASSESS: CPT | Performed by: PHYSICIAN ASSISTANT

## 2019-06-25 PROCEDURE — G8400 PT W/DXA NO RESULTS DOC: HCPCS | Performed by: PHYSICIAN ASSISTANT

## 2019-06-25 ASSESSMENT — ENCOUNTER SYMPTOMS
COUGH: 1
WHEEZING: 0
SHORTNESS OF BREATH: 1
STRIDOR: 0
CHEST TIGHTNESS: 0

## 2019-06-26 PROCEDURE — 93010 ELECTROCARDIOGRAM REPORT: CPT | Performed by: INTERNAL MEDICINE

## 2019-06-27 ENCOUNTER — HOSPITAL ENCOUNTER (OUTPATIENT)
Dept: CT IMAGING | Age: 74
Discharge: HOME | End: 2019-06-29
Payer: MEDICARE

## 2019-06-27 DIAGNOSIS — C34.2 MALIGNANT NEOPLASM OF MIDDLE LOBE OF RIGHT LUNG (HCC): ICD-10-CM

## 2019-06-27 DIAGNOSIS — C78.02 SECONDARY MALIGNANT NEOPLASM OF LEFT LUNG (HCC): ICD-10-CM

## 2019-06-27 PROCEDURE — 71260 CT THORAX DX C+: CPT

## 2019-06-27 PROCEDURE — 6360000004 HC RX CONTRAST MEDICATION: Performed by: INTERNAL MEDICINE

## 2019-06-27 RX ADMIN — IOPAMIDOL 100 ML: 755 INJECTION, SOLUTION INTRAVENOUS at 12:19

## 2019-06-28 LAB
AFB STAIN: NORMAL
FINAL REPORT: NORMAL
PRELIMINARY: NORMAL

## 2019-07-01 ENCOUNTER — TELEPHONE (OUTPATIENT)
Dept: PALLATIVE CARE | Age: 74
End: 2019-07-01

## 2019-07-01 DIAGNOSIS — Z51.5 PALLIATIVE CARE ENCOUNTER: ICD-10-CM

## 2019-07-01 DIAGNOSIS — F41.8 ANXIETY ABOUT HEALTH: Primary | ICD-10-CM

## 2019-07-01 RX ORDER — FLUTICASONE PROPIONATE 50 MCG
2 SPRAY, SUSPENSION (ML) NASAL DAILY
Qty: 3 BOTTLE | Refills: 1 | Status: SHIPPED | OUTPATIENT
Start: 2019-07-01

## 2019-07-01 RX ORDER — SERTRALINE HYDROCHLORIDE 25 MG/1
25 TABLET, FILM COATED ORAL DAILY
Qty: 30 TABLET | Refills: 3 | Status: SHIPPED | OUTPATIENT
Start: 2019-07-01 | End: 2019-07-31

## 2019-07-02 ENCOUNTER — APPOINTMENT (OUTPATIENT)
Dept: CT IMAGING | Age: 74
DRG: 189 | End: 2019-07-02
Payer: MEDICARE

## 2019-07-02 ENCOUNTER — HOSPITAL ENCOUNTER (INPATIENT)
Age: 74
LOS: 6 days | Discharge: HOME HEALTH | DRG: 189 | End: 2019-07-08
Attending: INTERNAL MEDICINE | Admitting: INTERNAL MEDICINE
Payer: MEDICARE

## 2019-07-02 ENCOUNTER — APPOINTMENT (OUTPATIENT)
Dept: GENERAL RADIOLOGY | Age: 74
DRG: 189 | End: 2019-07-02
Payer: MEDICARE

## 2019-07-02 DIAGNOSIS — R09.02 HYPOXIA: ICD-10-CM

## 2019-07-02 DIAGNOSIS — J44.1 COPD EXACERBATION (HCC): ICD-10-CM

## 2019-07-02 DIAGNOSIS — J18.9 PNEUMONIA DUE TO ORGANISM: Primary | ICD-10-CM

## 2019-07-02 LAB
ALBUMIN SERPL-MCNC: 3.6 G/DL (ref 3.5–4.6)
ALP BLD-CCNC: 74 U/L (ref 40–130)
ALT SERPL-CCNC: 12 U/L (ref 0–33)
ANION GAP SERPL CALCULATED.3IONS-SCNC: 20 MEQ/L (ref 9–15)
APTT: 27.3 SEC (ref 21.6–35.4)
AST SERPL-CCNC: 18 U/L (ref 0–35)
BASOPHILS ABSOLUTE: 0 K/UL (ref 0–0.2)
BASOPHILS RELATIVE PERCENT: 0.3 %
BILIRUB SERPL-MCNC: 0.3 MG/DL (ref 0.2–0.7)
BILIRUBIN URINE: NEGATIVE
BLOOD, URINE: NEGATIVE
BUN BLDV-MCNC: 19 MG/DL (ref 8–23)
CALCIUM SERPL-MCNC: 9.6 MG/DL (ref 8.5–9.9)
CHLORIDE BLD-SCNC: 94 MEQ/L (ref 95–107)
CLARITY: CLEAR
CO2: 19 MEQ/L (ref 20–31)
COLOR: YELLOW
CREAT SERPL-MCNC: 0.72 MG/DL (ref 0.5–0.9)
EKG ATRIAL RATE: 107 BPM
EKG P AXIS: 52 DEGREES
EKG P-R INTERVAL: 134 MS
EKG Q-T INTERVAL: 360 MS
EKG QRS DURATION: 90 MS
EKG QTC CALCULATION (BAZETT): 480 MS
EKG R AXIS: 201 DEGREES
EKG T AXIS: 37 DEGREES
EKG VENTRICULAR RATE: 107 BPM
EOSINOPHILS ABSOLUTE: 0 K/UL (ref 0–0.7)
EOSINOPHILS RELATIVE PERCENT: 0.1 %
GFR AFRICAN AMERICAN: >60
GFR AFRICAN AMERICAN: >60
GFR NON-AFRICAN AMERICAN: >60
GFR NON-AFRICAN AMERICAN: >60
GLOBULIN: 3.4 G/DL (ref 2.3–3.5)
GLUCOSE BLD-MCNC: 133 MG/DL (ref 70–99)
GLUCOSE URINE: NEGATIVE MG/DL
HCT VFR BLD CALC: 41.8 % (ref 37–47)
HEMOGLOBIN: 14.7 G/DL (ref 12–16)
INR BLD: 1
KETONES, URINE: ABNORMAL MG/DL
LACTIC ACID: 1.1 MMOL/L (ref 0.5–2.2)
LEUKOCYTE ESTERASE, URINE: NEGATIVE
LYMPHOCYTES ABSOLUTE: 0.2 K/UL (ref 1–4.8)
LYMPHOCYTES RELATIVE PERCENT: 2.1 %
MAGNESIUM: 1.6 MG/DL (ref 1.7–2.4)
MCH RBC QN AUTO: 33.8 PG (ref 27–31.3)
MCHC RBC AUTO-ENTMCNC: 35.1 % (ref 33–37)
MCV RBC AUTO: 96.5 FL (ref 82–100)
MONOCYTES ABSOLUTE: 0.2 K/UL (ref 0.2–0.8)
MONOCYTES RELATIVE PERCENT: 1.6 %
NEUTROPHILS ABSOLUTE: 11.1 K/UL (ref 1.4–6.5)
NEUTROPHILS RELATIVE PERCENT: 95.9 %
NITRITE, URINE: NEGATIVE
PDW BLD-RTO: 13.1 % (ref 11.5–14.5)
PERFORMED ON: NORMAL
PH UA: 6 (ref 5–9)
PLATELET # BLD: 336 K/UL (ref 130–400)
POC CREATININE WHOLE BLOOD: 0.6
POC CREATININE: 0.6 MG/DL (ref 0.6–1.2)
POC SAMPLE TYPE: NORMAL
POTASSIUM SERPL-SCNC: 3.9 MEQ/L (ref 3.4–4.9)
PROCALCITONIN: 0.13 NG/ML (ref 0–0.15)
PROTEIN UA: NEGATIVE MG/DL
PROTHROMBIN TIME: 9.9 SEC (ref 9–11.5)
RBC # BLD: 4.33 M/UL (ref 4.2–5.4)
SODIUM BLD-SCNC: 133 MEQ/L (ref 135–144)
SPECIFIC GRAVITY UA: 1.01 (ref 1–1.03)
TOTAL CK: 56 U/L (ref 0–170)
TOTAL PROTEIN: 7 G/DL (ref 6.3–8)
TROPONIN: <0.01 NG/ML (ref 0–0.01)
URINE REFLEX TO CULTURE: ABNORMAL
UROBILINOGEN, URINE: 0.2 E.U./DL
WBC # BLD: 11.6 K/UL (ref 4.8–10.8)

## 2019-07-02 PROCEDURE — 94760 N-INVAS EAR/PLS OXIMETRY 1: CPT

## 2019-07-02 PROCEDURE — 80053 COMPREHEN METABOLIC PANEL: CPT

## 2019-07-02 PROCEDURE — 84484 ASSAY OF TROPONIN QUANT: CPT

## 2019-07-02 PROCEDURE — 71275 CT ANGIOGRAPHY CHEST: CPT

## 2019-07-02 PROCEDURE — 6370000000 HC RX 637 (ALT 250 FOR IP): Performed by: NURSE PRACTITIONER

## 2019-07-02 PROCEDURE — 2580000003 HC RX 258: Performed by: PERSONAL EMERGENCY RESPONSE ATTENDANT

## 2019-07-02 PROCEDURE — 83735 ASSAY OF MAGNESIUM: CPT

## 2019-07-02 PROCEDURE — 6360000002 HC RX W HCPCS: Performed by: NURSE PRACTITIONER

## 2019-07-02 PROCEDURE — 36415 COLL VENOUS BLD VENIPUNCTURE: CPT

## 2019-07-02 PROCEDURE — 94664 DEMO&/EVAL PT USE INHALER: CPT

## 2019-07-02 PROCEDURE — 81003 URINALYSIS AUTO W/O SCOPE: CPT

## 2019-07-02 PROCEDURE — 85025 COMPLETE CBC W/AUTO DIFF WBC: CPT

## 2019-07-02 PROCEDURE — 93005 ELECTROCARDIOGRAM TRACING: CPT | Performed by: NURSE PRACTITIONER

## 2019-07-02 PROCEDURE — 1210000000 HC MED SURG R&B

## 2019-07-02 PROCEDURE — 96366 THER/PROPH/DIAG IV INF ADDON: CPT

## 2019-07-02 PROCEDURE — 2580000003 HC RX 258: Performed by: NURSE PRACTITIONER

## 2019-07-02 PROCEDURE — 96375 TX/PRO/DX INJ NEW DRUG ADDON: CPT

## 2019-07-02 PROCEDURE — 83605 ASSAY OF LACTIC ACID: CPT

## 2019-07-02 PROCEDURE — 99285 EMERGENCY DEPT VISIT HI MDM: CPT

## 2019-07-02 PROCEDURE — 96365 THER/PROPH/DIAG IV INF INIT: CPT

## 2019-07-02 PROCEDURE — 85730 THROMBOPLASTIN TIME PARTIAL: CPT

## 2019-07-02 PROCEDURE — 85610 PROTHROMBIN TIME: CPT

## 2019-07-02 PROCEDURE — 71045 X-RAY EXAM CHEST 1 VIEW: CPT

## 2019-07-02 PROCEDURE — 87040 BLOOD CULTURE FOR BACTERIA: CPT

## 2019-07-02 PROCEDURE — 84145 PROCALCITONIN (PCT): CPT

## 2019-07-02 PROCEDURE — 82550 ASSAY OF CK (CPK): CPT

## 2019-07-02 PROCEDURE — 94640 AIRWAY INHALATION TREATMENT: CPT

## 2019-07-02 PROCEDURE — 6360000004 HC RX CONTRAST MEDICATION: Performed by: NURSE PRACTITIONER

## 2019-07-02 RX ORDER — MAGNESIUM SULFATE IN WATER 40 MG/ML
4 INJECTION, SOLUTION INTRAVENOUS ONCE
Status: COMPLETED | OUTPATIENT
Start: 2019-07-02 | End: 2019-07-02

## 2019-07-02 RX ORDER — SODIUM CHLORIDE 0.9 % (FLUSH) 0.9 %
10 SYRINGE (ML) INJECTION PRN
Status: DISCONTINUED | OUTPATIENT
Start: 2019-07-02 | End: 2019-07-08 | Stop reason: HOSPADM

## 2019-07-02 RX ORDER — SODIUM CHLORIDE 0.9 % (FLUSH) 0.9 %
10 SYRINGE (ML) INJECTION EVERY 12 HOURS SCHEDULED
Status: DISCONTINUED | OUTPATIENT
Start: 2019-07-02 | End: 2019-07-08 | Stop reason: HOSPADM

## 2019-07-02 RX ORDER — METHYLPREDNISOLONE SODIUM SUCCINATE 125 MG/2ML
125 INJECTION, POWDER, LYOPHILIZED, FOR SOLUTION INTRAMUSCULAR; INTRAVENOUS ONCE
Status: COMPLETED | OUTPATIENT
Start: 2019-07-02 | End: 2019-07-02

## 2019-07-02 RX ORDER — METHYLPREDNISOLONE SODIUM SUCCINATE 40 MG/ML
40 INJECTION, POWDER, LYOPHILIZED, FOR SOLUTION INTRAMUSCULAR; INTRAVENOUS EVERY 6 HOURS
Status: DISCONTINUED | OUTPATIENT
Start: 2019-07-02 | End: 2019-07-02

## 2019-07-02 RX ORDER — IPRATROPIUM BROMIDE AND ALBUTEROL SULFATE 2.5; .5 MG/3ML; MG/3ML
1 SOLUTION RESPIRATORY (INHALATION)
Status: DISCONTINUED | OUTPATIENT
Start: 2019-07-03 | End: 2019-07-02

## 2019-07-02 RX ORDER — PREDNISONE 10 MG/1
40 TABLET ORAL DAILY
Status: DISCONTINUED | OUTPATIENT
Start: 2019-07-05 | End: 2019-07-02

## 2019-07-02 RX ORDER — ONDANSETRON 2 MG/ML
4 INJECTION INTRAMUSCULAR; INTRAVENOUS EVERY 6 HOURS PRN
Status: DISCONTINUED | OUTPATIENT
Start: 2019-07-02 | End: 2019-07-08 | Stop reason: HOSPADM

## 2019-07-02 RX ORDER — IPRATROPIUM BROMIDE AND ALBUTEROL SULFATE 2.5; .5 MG/3ML; MG/3ML
1 SOLUTION RESPIRATORY (INHALATION) PRN
Status: DISCONTINUED | OUTPATIENT
Start: 2019-07-02 | End: 2019-07-02

## 2019-07-02 RX ORDER — ALBUTEROL SULFATE 2.5 MG/3ML
2.5 SOLUTION RESPIRATORY (INHALATION)
Status: DISCONTINUED | OUTPATIENT
Start: 2019-07-02 | End: 2019-07-08 | Stop reason: HOSPADM

## 2019-07-02 RX ORDER — METHYLPREDNISOLONE SODIUM SUCCINATE 40 MG/ML
40 INJECTION, POWDER, LYOPHILIZED, FOR SOLUTION INTRAMUSCULAR; INTRAVENOUS EVERY 8 HOURS
Status: DISCONTINUED | OUTPATIENT
Start: 2019-07-03 | End: 2019-07-07

## 2019-07-02 RX ORDER — IPRATROPIUM BROMIDE AND ALBUTEROL SULFATE 2.5; .5 MG/3ML; MG/3ML
1 SOLUTION RESPIRATORY (INHALATION) 4 TIMES DAILY
Status: DISCONTINUED | OUTPATIENT
Start: 2019-07-03 | End: 2019-07-08 | Stop reason: HOSPADM

## 2019-07-02 RX ORDER — SODIUM CHLORIDE 9 MG/ML
INJECTION, SOLUTION INTRAVENOUS CONTINUOUS
Status: DISCONTINUED | OUTPATIENT
Start: 2019-07-02 | End: 2019-07-03

## 2019-07-02 RX ORDER — ACETAMINOPHEN 325 MG/1
650 TABLET ORAL EVERY 4 HOURS PRN
Status: DISCONTINUED | OUTPATIENT
Start: 2019-07-02 | End: 2019-07-08 | Stop reason: HOSPADM

## 2019-07-02 RX ORDER — DOXYCYCLINE HYCLATE 100 MG
100 TABLET ORAL 2 TIMES DAILY
Status: ON HOLD | COMMUNITY
End: 2019-07-08 | Stop reason: HOSPADM

## 2019-07-02 RX ADMIN — VANCOMYCIN HYDROCHLORIDE 1000 MG: 1 INJECTION, POWDER, LYOPHILIZED, FOR SOLUTION INTRAVENOUS at 21:24

## 2019-07-02 RX ADMIN — IPRATROPIUM BROMIDE AND ALBUTEROL SULFATE 1 AMPULE: .5; 3 SOLUTION RESPIRATORY (INHALATION) at 21:34

## 2019-07-02 RX ADMIN — ENOXAPARIN SODIUM 40 MG: 40 INJECTION SUBCUTANEOUS at 21:23

## 2019-07-02 RX ADMIN — SODIUM CHLORIDE: 9 INJECTION, SOLUTION INTRAVENOUS at 18:17

## 2019-07-02 RX ADMIN — IOPAMIDOL 100 ML: 755 INJECTION, SOLUTION INTRAVENOUS at 19:43

## 2019-07-02 RX ADMIN — IPRATROPIUM BROMIDE AND ALBUTEROL SULFATE 1 AMPULE: .5; 3 SOLUTION RESPIRATORY (INHALATION) at 17:51

## 2019-07-02 RX ADMIN — PIPERACILLIN SODIUM,TAZOBACTAM SODIUM 3.38 G: 3; .375 INJECTION, POWDER, FOR SOLUTION INTRAVENOUS at 23:34

## 2019-07-02 RX ADMIN — MAGNESIUM SULFATE HEPTAHYDRATE 4 G: 40 INJECTION, SOLUTION INTRAVENOUS at 18:07

## 2019-07-02 RX ADMIN — SODIUM CHLORIDE: 9 INJECTION, SOLUTION INTRAVENOUS at 21:21

## 2019-07-02 RX ADMIN — Medication 10 ML: at 21:24

## 2019-07-02 RX ADMIN — METHYLPREDNISOLONE SODIUM SUCCINATE 125 MG: 125 INJECTION, POWDER, FOR SOLUTION INTRAMUSCULAR; INTRAVENOUS at 18:06

## 2019-07-02 ASSESSMENT — ENCOUNTER SYMPTOMS
ALLERGIC/IMMUNOLOGIC NEGATIVE: 1
BACK PAIN: 0
WHEEZING: 1
SORE THROAT: 0
VOMITING: 0
COUGH: 1
DIARRHEA: 0
GASTROINTESTINAL NEGATIVE: 1
EYES NEGATIVE: 1
NAUSEA: 0
ABDOMINAL PAIN: 0
PHOTOPHOBIA: 0
RHINORRHEA: 0
SHORTNESS OF BREATH: 1
EYE PAIN: 0

## 2019-07-02 ASSESSMENT — PAIN SCALES - GENERAL: PAINLEVEL_OUTOF10: 4

## 2019-07-02 NOTE — ED PROVIDER NOTES
connections:     Talks on phone: Not on file     Gets together: Not on file     Attends [employer] service: Not on file     Active member of club or organization: Not on file     Attends meetings of clubs or organizations: Not on file     Relationship status: Not on file    Intimate partner violence:     Fear of current or ex partner: Not on file     Emotionally abused: Not on file     Physically abused: Not on file     Forced sexual activity: Not on file   Other Topics Concern    Not on file   Social History Narrative    Not on file       SCREENINGS             PHYSICAL EXAM    (up to 7 for level 4, 8 or more for level 5)     ED Triage Vitals [07/02/19 1608]   BP Temp Temp Source Pulse Resp SpO2 Height Weight   120/74 97.9 °F (36.6 °C) Oral 118 22 (!) 86 % 5' 2\" (1.575 m) 99 lb (44.9 kg)       Physical Exam   Constitutional: She is oriented to person, place, and time. She appears well-developed and well-nourished. She is active. She appears distressed. HENT:   Head: Normocephalic and atraumatic. Mouth/Throat: Mucous membranes are normal.   Eyes: Conjunctivae and lids are normal.   Neck: Normal range of motion. Neck supple. Cardiovascular: Regular rhythm, normal heart sounds, intact distal pulses and normal pulses. Tachycardia present. Pulmonary/Chest: Tachypnea noted. She is in respiratory distress (mild). She has decreased breath sounds. She has rales in the right middle field, the right lower field and the left lower field. Abdominal: Soft. Normal appearance and bowel sounds are normal. There is no tenderness. Lymphadenopathy:     She has no cervical adenopathy. Neurological: She is alert and oriented to person, place, and time. She has normal strength. Skin: Skin is warm, dry and intact. Capillary refill takes less than 2 seconds. No rash noted. She is not diaphoretic. Psychiatric: Judgment and thought content normal.   Nursing note and vitals reviewed.       RESULTS     EKG: All EKG's are 1800   BP: 120/74 111/79  118/78   Pulse: 118 108  98   Resp: 22 20 20 20   Temp: 97.9 °F (36.6 °C)      TempSrc: Oral      SpO2: (!) 86% 99% 99% 100%   Weight: 99 lb (44.9 kg)      Height: 5' 2\" (1.575 m)               MDM on examination patient is obviously short of breath. Her vital signs are abnormal.  She is tachycardic tachypneic and hypoxic. I immediately have concern for acute pathology such as pneumonia or pulmonary embolus. I will obtain laboratory and radiology studies for evaluation of acute pathology. She will also be provided duo nebs, Solu-Medrol, magnesium and obtain a blood gas. She is temporarily placed on a nonrebreather pending blood gas as she is hypoxic at 83 to 84%. The nonrebreather does improve her oxygenation to 97 to 98%. She is tolerating this much better. She is unsure if she has a history of hypercapnia. She will be reevaluated  Chest x-ray does demonstrate some increased consolidation concerning for pneumonia. I will continue to proceed with obtaining a CTA of the chest given her risk factors for pulmonary embolus. Given her immunocompromise status as well as a COPD she will be placed on vancomycin and Zosyn. On reassessment, patient has minimally labored respirations with crackles in bilateral bases. She informs me that she is normally on 4 L of oxygen continuous. She has received a total of 3 treatments of chemo for lung cancer, her last chemo 2 months ago. Dr. Gwen Gonsalez is her hematologist/oncologist.  Lab work reveals sodium 133, chloride 94, CO2 19, anion gap 20, magnesium 1.6, WBC 11.6    CRITICAL CARE TIME       CONSULTS:  None    PROCEDURES:  Unless otherwise noted below, none     Procedures    FINAL IMPRESSION      1. Pneumonia due to organism    2. Hypoxia    3. [facility]          DISPOSITION/PLAN   DISPOSITION Decision To Admit 07/02/2019 06:07:36 PM      PATIENT REFERRED TO:  No follow-up provider specified.     DISCHARGE MEDICATIONS:  New

## 2019-07-03 PROBLEM — E44.0 MODERATE MALNUTRITION (HCC): Status: ACTIVE | Noted: 2019-07-03

## 2019-07-03 PROBLEM — E44.0 MODERATE MALNUTRITION (HCC): Chronic | Status: ACTIVE | Noted: 2019-07-03

## 2019-07-03 PROBLEM — E43 SEVERE MALNUTRITION (HCC): Status: RESOLVED | Noted: 2019-04-20 | Resolved: 2019-07-03

## 2019-07-03 LAB
ALBUMIN SERPL-MCNC: 3.3 G/DL (ref 3.5–4.6)
ALP BLD-CCNC: 62 U/L (ref 40–130)
ALT SERPL-CCNC: 13 U/L (ref 0–33)
ANION GAP SERPL CALCULATED.3IONS-SCNC: 14 MEQ/L (ref 9–15)
ANISOCYTOSIS: ABNORMAL
AST SERPL-CCNC: 15 U/L (ref 0–35)
BASOPHILS ABSOLUTE: 0 K/UL (ref 0–0.2)
BASOPHILS RELATIVE PERCENT: 0.1 %
BILIRUB SERPL-MCNC: <0.2 MG/DL (ref 0.2–0.7)
BUN BLDV-MCNC: 16 MG/DL (ref 8–23)
CALCIUM SERPL-MCNC: 8.4 MG/DL (ref 8.5–9.9)
CHLORIDE BLD-SCNC: 105 MEQ/L (ref 95–107)
CO2: 21 MEQ/L (ref 20–31)
CREAT SERPL-MCNC: 0.53 MG/DL (ref 0.5–0.9)
EOSINOPHILS ABSOLUTE: 0 K/UL (ref 0–0.7)
EOSINOPHILS RELATIVE PERCENT: 0 %
GFR AFRICAN AMERICAN: >60
GFR NON-AFRICAN AMERICAN: >60
GLOBULIN: 3 G/DL (ref 2.3–3.5)
GLUCOSE BLD-MCNC: 137 MG/DL (ref 70–99)
HCT VFR BLD CALC: 39 % (ref 37–47)
HEMOGLOBIN: 13.6 G/DL (ref 12–16)
LYMPHOCYTES ABSOLUTE: 0.3 K/UL (ref 1–4.8)
LYMPHOCYTES RELATIVE PERCENT: 8 %
MCH RBC QN AUTO: 34.3 PG (ref 27–31.3)
MCHC RBC AUTO-ENTMCNC: 34.8 % (ref 33–37)
MCV RBC AUTO: 98.6 FL (ref 82–100)
METAMYELOCYTES RELATIVE PERCENT: 2 %
MONOCYTES ABSOLUTE: 0.1 K/UL (ref 0.2–0.8)
MONOCYTES RELATIVE PERCENT: 2.8 %
MYELOCYTE PERCENT: 3 %
NEUTROPHILS ABSOLUTE: 3.6 K/UL (ref 1.4–6.5)
NEUTROPHILS RELATIVE PERCENT: 85 %
PDW BLD-RTO: 13.4 % (ref 11.5–14.5)
PLATELET # BLD: 311 K/UL (ref 130–400)
PLATELET SLIDE REVIEW: ADEQUATE
POTASSIUM REFLEX MAGNESIUM: 4 MEQ/L (ref 3.4–4.9)
RBC # BLD: 3.96 M/UL (ref 4.2–5.4)
SLIDE REVIEW: ABNORMAL
SODIUM BLD-SCNC: 140 MEQ/L (ref 135–144)
TOTAL PROTEIN: 6.3 G/DL (ref 6.3–8)
WBC # BLD: 4 K/UL (ref 4.8–10.8)

## 2019-07-03 PROCEDURE — 2580000003 HC RX 258: Performed by: NURSE PRACTITIONER

## 2019-07-03 PROCEDURE — 1210000000 HC MED SURG R&B

## 2019-07-03 PROCEDURE — 6370000000 HC RX 637 (ALT 250 FOR IP): Performed by: INTERNAL MEDICINE

## 2019-07-03 PROCEDURE — 94640 AIRWAY INHALATION TREATMENT: CPT

## 2019-07-03 PROCEDURE — 85025 COMPLETE CBC W/AUTO DIFF WBC: CPT

## 2019-07-03 PROCEDURE — 94150 VITAL CAPACITY TEST: CPT

## 2019-07-03 PROCEDURE — 6370000000 HC RX 637 (ALT 250 FOR IP): Performed by: PHYSICIAN ASSISTANT

## 2019-07-03 PROCEDURE — 6360000002 HC RX W HCPCS: Performed by: INTERNAL MEDICINE

## 2019-07-03 PROCEDURE — 93010 ELECTROCARDIOGRAM REPORT: CPT | Performed by: INTERNAL MEDICINE

## 2019-07-03 PROCEDURE — 94760 N-INVAS EAR/PLS OXIMETRY 1: CPT

## 2019-07-03 PROCEDURE — 94667 MNPJ CHEST WALL 1ST: CPT

## 2019-07-03 PROCEDURE — 80053 COMPREHEN METABOLIC PANEL: CPT

## 2019-07-03 PROCEDURE — 6360000002 HC RX W HCPCS: Performed by: NURSE PRACTITIONER

## 2019-07-03 PROCEDURE — 36415 COLL VENOUS BLD VENIPUNCTURE: CPT

## 2019-07-03 PROCEDURE — 6370000000 HC RX 637 (ALT 250 FOR IP): Performed by: PERSONAL EMERGENCY RESPONSE ATTENDANT

## 2019-07-03 RX ORDER — AMLODIPINE BESYLATE 5 MG/1
5 TABLET ORAL DAILY
Status: DISCONTINUED | OUTPATIENT
Start: 2019-07-03 | End: 2019-07-08 | Stop reason: HOSPADM

## 2019-07-03 RX ORDER — POLYETHYLENE GLYCOL 3350 17 G/17G
17 POWDER, FOR SOLUTION ORAL DAILY
Status: DISCONTINUED | OUTPATIENT
Start: 2019-07-03 | End: 2019-07-08 | Stop reason: HOSPADM

## 2019-07-03 RX ORDER — SERTRALINE HYDROCHLORIDE 25 MG/1
25 TABLET, FILM COATED ORAL DAILY
Status: DISCONTINUED | OUTPATIENT
Start: 2019-07-03 | End: 2019-07-08 | Stop reason: HOSPADM

## 2019-07-03 RX ORDER — M-VIT,TX,IRON,MINS/CALC/FOLIC 27MG-0.4MG
1 TABLET ORAL DAILY
Status: DISCONTINUED | OUTPATIENT
Start: 2019-07-03 | End: 2019-07-08 | Stop reason: HOSPADM

## 2019-07-03 RX ORDER — FLUTICASONE PROPIONATE 50 MCG
2 SPRAY, SUSPENSION (ML) NASAL DAILY
Status: DISCONTINUED | OUTPATIENT
Start: 2019-07-03 | End: 2019-07-08 | Stop reason: HOSPADM

## 2019-07-03 RX ORDER — POLYETHYLENE GLYCOL 3350 17 G/17G
17 POWDER, FOR SOLUTION ORAL DAILY
Status: DISCONTINUED | OUTPATIENT
Start: 2019-07-03 | End: 2019-07-03 | Stop reason: RX

## 2019-07-03 RX ORDER — GUAIFENESIN 600 MG/1
600 TABLET, EXTENDED RELEASE ORAL 2 TIMES DAILY
Status: DISCONTINUED | OUTPATIENT
Start: 2019-07-03 | End: 2019-07-08 | Stop reason: HOSPADM

## 2019-07-03 RX ORDER — FOLIC ACID 1 MG/1
1 TABLET ORAL DAILY
Status: DISCONTINUED | OUTPATIENT
Start: 2019-07-03 | End: 2019-07-08 | Stop reason: HOSPADM

## 2019-07-03 RX ORDER — PANTOPRAZOLE SODIUM 40 MG/1
40 TABLET, DELAYED RELEASE ORAL DAILY
Status: DISCONTINUED | OUTPATIENT
Start: 2019-07-03 | End: 2019-07-08 | Stop reason: HOSPADM

## 2019-07-03 RX ORDER — ASPIRIN 81 MG/1
81 TABLET, CHEWABLE ORAL DAILY
Status: DISCONTINUED | OUTPATIENT
Start: 2019-07-03 | End: 2019-07-08 | Stop reason: HOSPADM

## 2019-07-03 RX ORDER — HYDROCODONE BITARTRATE AND ACETAMINOPHEN 5; 325 MG/1; MG/1
1 TABLET ORAL EVERY 6 HOURS PRN
Status: DISCONTINUED | OUTPATIENT
Start: 2019-07-03 | End: 2019-07-08 | Stop reason: HOSPADM

## 2019-07-03 RX ADMIN — HYDROCODONE BITARTRATE AND ACETAMINOPHEN 1 TABLET: 5; 325 TABLET ORAL at 21:33

## 2019-07-03 RX ADMIN — METHYLPREDNISOLONE SODIUM SUCCINATE 40 MG: 40 INJECTION, POWDER, FOR SOLUTION INTRAMUSCULAR; INTRAVENOUS at 09:05

## 2019-07-03 RX ADMIN — MULTIPLE VITAMINS W/ MINERALS TAB 1 TABLET: TAB at 14:53

## 2019-07-03 RX ADMIN — Medication 10 ML: at 09:04

## 2019-07-03 RX ADMIN — Medication 10 ML: at 21:34

## 2019-07-03 RX ADMIN — GUAIFENESIN 600 MG: 600 TABLET, EXTENDED RELEASE ORAL at 21:33

## 2019-07-03 RX ADMIN — SERTRALINE HYDROCHLORIDE 25 MG: 25 TABLET ORAL at 14:53

## 2019-07-03 RX ADMIN — ASPIRIN 81 MG 81 MG: 81 TABLET ORAL at 14:53

## 2019-07-03 RX ADMIN — GUAIFENESIN 600 MG: 600 TABLET, EXTENDED RELEASE ORAL at 14:52

## 2019-07-03 RX ADMIN — METHYLPREDNISOLONE SODIUM SUCCINATE 40 MG: 40 INJECTION, POWDER, FOR SOLUTION INTRAMUSCULAR; INTRAVENOUS at 01:43

## 2019-07-03 RX ADMIN — AMLODIPINE BESYLATE 5 MG: 5 TABLET ORAL at 14:53

## 2019-07-03 RX ADMIN — IPRATROPIUM BROMIDE AND ALBUTEROL SULFATE 1 AMPULE: 2.5; .5 SOLUTION RESPIRATORY (INHALATION) at 15:09

## 2019-07-03 RX ADMIN — PANTOPRAZOLE SODIUM 40 MG: 40 TABLET, DELAYED RELEASE ORAL at 14:53

## 2019-07-03 RX ADMIN — IPRATROPIUM BROMIDE AND ALBUTEROL SULFATE 1 AMPULE: 2.5; .5 SOLUTION RESPIRATORY (INHALATION) at 20:25

## 2019-07-03 RX ADMIN — FLUTICASONE PROPIONATE 2 SPRAY: 50 SPRAY, METERED NASAL at 15:35

## 2019-07-03 RX ADMIN — HYDROCODONE BITARTRATE AND ACETAMINOPHEN 1 TABLET: 5; 325 TABLET ORAL at 15:35

## 2019-07-03 RX ADMIN — ENOXAPARIN SODIUM 40 MG: 40 INJECTION SUBCUTANEOUS at 21:33

## 2019-07-03 RX ADMIN — IPRATROPIUM BROMIDE AND ALBUTEROL SULFATE 1 AMPULE: 2.5; .5 SOLUTION RESPIRATORY (INHALATION) at 11:11

## 2019-07-03 RX ADMIN — METHYLPREDNISOLONE SODIUM SUCCINATE 40 MG: 40 INJECTION, POWDER, FOR SOLUTION INTRAMUSCULAR; INTRAVENOUS at 17:44

## 2019-07-03 RX ADMIN — IPRATROPIUM BROMIDE AND ALBUTEROL SULFATE 1 AMPULE: 2.5; .5 SOLUTION RESPIRATORY (INHALATION) at 07:33

## 2019-07-03 RX ADMIN — FOLIC ACID 1 MG: 1 TABLET ORAL at 14:53

## 2019-07-03 ASSESSMENT — PAIN DESCRIPTION - PAIN TYPE: TYPE: ACUTE PAIN

## 2019-07-03 ASSESSMENT — PAIN SCALES - GENERAL
PAINLEVEL_OUTOF10: 4
PAINLEVEL_OUTOF10: 5
PAINLEVEL_OUTOF10: 5
PAINLEVEL_OUTOF10: 6
PAINLEVEL_OUTOF10: 5

## 2019-07-03 ASSESSMENT — PAIN DESCRIPTION - DESCRIPTORS: DESCRIPTORS: ACHING

## 2019-07-03 ASSESSMENT — PAIN DESCRIPTION - LOCATION
LOCATION: CHEST
LOCATION: CHEST

## 2019-07-03 NOTE — PLAN OF CARE
Nutrition Problem: Unintended weight loss  Intervention: Food and/or Nutrient Delivery: Continue current diet, Start ONS(Continue with General diet. Encourage 4-6 small meals per day.  High Calorie ONS x3, Clear ONS x3 to promote weight gain to UBW 1053)  Nutritional Goals: po > 75% meals and ONS with weight gain to  105#

## 2019-07-03 NOTE — PROGRESS NOTES
Pt assessment completed. Alert and oriented x 4. Independent in room to bsc. Lung sounds diminished to all lobes. Crackles noted to bilateral posterior bases, hospitalist notified. 89-93% on 4L NC. S1, S2 audible, np adventitious sounds. Peripheral pulses palpable. Bowel sounds active x 4 quadrants. Soft, non-tender. Skin Intact. Patient c/o 5/10 \"mild\" mid chest pain that is \"annoying\". States same pain she has had since coming in. Patient denies any further needs at this time.     Will continue to monitor and hourly round

## 2019-07-03 NOTE — PROGRESS NOTES
[facility] Respiratory Therapy Evaluation   Current Order:  DUONEB Q4 W/A     Home Regimen: Q4 W/A PER PT      Ordering Physician: Angelita PORRAS  Re-evaluation Date:  7/5     Diagnosis: COPD EXAC WITH POSSIBLE PNEUMONIA      Patient Status:                                                                                                                                                                                                                                                                                                                    / Unstable + Physician notified    The following MDI Criteria must be met in order to convert aerosol to MDI with spacer.  If unable to meet, MDI will be converted to aerosol:  []  Patient able to demonstrate the ability to use MDI effectively  []  Patient alert and cooperative  []  Patient able to take deep breath with 5-10 second hold  []  Medication(s) available in this delivery method   []  Peak flow greater than or equal to 200 ml/min            Current Order Substituted To  (same drug, same frequency)   Aerosol to MDI [] Albuterol Sulfate 0.083% unit dose by aerosol Albuterol Sulfate MDI 2 puffs by inhalation with spacer    [] Levalbuterol 1.25 mg unit dose by aerosol Levalbuterol MDI 2 puffs by inhalation with spacer    [] Levalbuterol 0.63 mg unit dose by aerosol Levalbuterol MDI 2 puffs by inhalation with spacer    [] Ipratropium Bromide 0.02% unit dose by aerosol Ipratropium Bromide MDI 2 puffs by inhalation with spacer    [] Duoneb (Ipratropium + Albuterol) unit dose by aerosol Ipratropium MDI + Albuterol MDI 2 puffs by inhalation w/spacer   MDI to Aerosol [] Albuterol Sulfate MDI Albuterol Sulfate 0.083% unit dose by aerosol    [] Levalbuterol MDI 2 puffs by inhalation Levalbuterol 1.25 mg unit dose by aerosol    [] Ipratropium Bromide MDI by inhalation Ipratropium Bromide 0.02% unit dose by aerosol    [] Combivent (Ipratropium + Albuterol) MDI by

## 2019-07-03 NOTE — PROGRESS NOTES
significant events occurring earlier or later in the day requiring my attention and focus. Subjective:   Admit Date: 7/2/2019  PCP: DERECK Jackson    No acute events overnight. Afebrile  Telemetry reviewed. Still SOB with ambulation. Recently received a breathing treatment. Feeling much better than yesterday when she was admitted. No new complaints. Pt denies chest pain, N/V, fevers or chills. Objective:     Vitals:    07/03/19 0724 07/03/19 0736 07/03/19 0908 07/03/19 1452   BP: 126/71   132/68   Pulse: 88      Resp: 18      Temp: 97.5 °F (36.4 °C)      TempSrc: Oral      SpO2: 100% 100% 93%    Weight:       Height:         General appearance: Mild acute distress, lethargic + O x 3. Mild conversational dyspnea noted. Dentition intact. Answers questions appropriately  Lungs: Diminished scant exp wheezes, course rales mild retractions; mod use of accessory muscles mild air hunger noted  Heart:  S1, S2 normal, RRR, no MRG appreciated  Abdomen: (+) BS, soft, non-tender; non distended no guarding or rigidity. Extremities:  no cyanosis, trace no edema bilat lower exts, no calf tenderness bilaterally.  Dry skin noted       Medications:      aspirin  81 mg Oral Daily    fluticasone  2 spray Each Nostril Daily    folic acid  1 mg Oral Daily    guaiFENesin  600 mg Oral BID    therapeutic multivitamin-minerals  1 tablet Oral Daily    sertraline  25 mg Oral Daily    pantoprazole  40 mg Oral Daily    amLODIPine  5 mg Oral Daily    tiotropium  1 capsule Inhalation Daily    polyethylene glycol  17 g Oral Daily    sodium chloride flush  10 mL Intravenous 2 times per day    enoxaparin  40 mg Subcutaneous Daily    ipratropium-albuterol  1 ampule Inhalation 4x daily    methylPREDNISolone  40 mg Intravenous Q8H       LABS Reviewed    IMAGING Reviewed    Marimar Peacock MD  [facility] Hospitalist

## 2019-07-03 NOTE — H&P
metastasis. Technical Factors:    CT imaging of the chest was obtained and formatted as 5 mm contiguous axial images from the thoracic inlet through the adrenal glands. Sagittal coronal reconstruction obtained during postprocessing. Intravenous contrast medium:  Isovue-370, 100 mL    Comparison:  CT chest, high-resolution, April 26, 2019, CTA chest, April 19, 2019, chest radiograph, May 1, 2018, PET/CT, February 1, 2019. Findings:    Right lung shows diffuse emphysematous change. Pleural-based, noncalcified, spiculated, 1.8 x 3.9 cm consolidation superior segment, right lower lobe (series 2, image 21). This finding is unchanged from April, 2019. Dependent subsegmental atelectatic   change, extends from this finding inferiorly. In addition, alveolar and reticular opacities are visualized coursing through the right middle, and right lower lobe. Left lung shows diffuse emphysematous change. Dependent subsegmental atelectatic change is found posteriorly in the left lower lobe, with reticular change identified at the left lung base. No hilar, mediastinal, or axillary lymph node enlargement. Thoracic aorta normal in course and caliber. Cardiac size normal. No pericardiac effusion. Caudal esophagus patulous, hiatal hernia identified. Adrenal glands without anomaly. Again visualized are multiple rounded areas of low attenuation, within the right and left hepatic lobes, the largest again measuring 1.5 cm found anteriorly within the   lateral segment of the left hepatic lobe, most consistent with a cyst. Several subcentimeter areas are again identified, unchanged, and too small to further characterize. Benign-appearing hemangiomas are found from T5 to T12. No osteoblastic, and no osteolytic lesions are visualized. Impression Extensive bilateral emphysematous change with bilateral extensive interstitial lung changes discussed.  The largest area of masslike scarring superimposed upon emphysematous handled by other specialists. Additional work up and treatment should be done in out pt setting by pt PCP and other out pt providers. In addition to examining and evaluating pt, I spent additional time explaining care, normaland abnormal findings, and treatment plan. All of pt questions were answered. Counseling, diet and education were provided. Case will be discussed with nursing staff when appropriate. Family will be updated if and whenappropriate. Electronically signed by KRISSY Collado CNP on 7/2/2019 at 8:13 PM     Attending's Note:  Pt was seen and evaluated and discussed care with NP. I agree with the above assessment and plan. Patient presented to the ED with worsening SOB and cough. She has lung cancer on chemo but now off chemo due to intolerance. CT did not show any clear pneumonia. Likely worsening of her lung cancer and COPD. Will treat with breathing treatment and steroids. Will consult pulmonology and oncology.       Nichol Gordon, [address] [facility]

## 2019-07-03 NOTE — CARE COORDINATION
LSW met with Pt to give her info on 2 places she can go to get DMEs that are not covered by her insurance, [address] [facility]. Since Pts bathroom is on second floor Pt wan  bedside toilet at [facility]. .Electronically signed by ZAN Quiroz on 7/3/2019 at 1:30 PM

## 2019-07-03 NOTE — CARE COORDINATION
Care Transition Nurse provided COPD/Pneumonia booklets and zones sheets and reviewed. Stressed importance of phoning physician promptly for symptoms in the yellow zone and ems for symptoms in the red zone. Discussed cause of COPD, how lungs work, avoiding respiratory infection, good handwashing, keeping nebulizer set up clean, avoiding inhaled irritants,breathing exercises, continue to use incentive spirometer and acapella at home,positions to reduce sob, energy conservation, copd medications, vaccines, drinking plenty of fluids, especially water, nutrition, limit sodium,  pulmonary rehab. Stressed  importance of physician follow up within one week of discharge. Patient voiced understanding. Patient states she was on antibiotics and prednisone prescribed by palliative care and initially improved but then symptoms returned. Dr. Ryan Sims office advised her to go to er. She states she was diagnosed with COPD and lung cancer in January 2019 and she quit smoking at that time. She washes her hands or uses hand  frequently. She avoids people who are ill. She does not go out of her house very much. She is aware of inhaled irritants to avoid. She has masks available to use as needed. She has and incentive spirometer and acapella at home from previous admission and she has been using these. She does tire very easily so referred to energy conservation tips. She is aware to take antibiotics and prednisone until gone. She uses Spiriva which is provided by Dr. Ryan Sims office since she cannot afford it. She knows to use this daily as prescribed. She has had a decreased appetite and not able to drink very much fluids due to shortness of breath. Stressed importance of getting plenty of fluids. She would like [facility] and [facility] will provide this for home use. Patient states she has plenty of assistance at home.  She has 3 daughters and a significant other that make sure she gets to her appointments, has food, and medications. She also has several neighbors that assist as needed.

## 2019-07-04 PROCEDURE — 6370000000 HC RX 637 (ALT 250 FOR IP): Performed by: INTERNAL MEDICINE

## 2019-07-04 PROCEDURE — 6360000002 HC RX W HCPCS: Performed by: INTERNAL MEDICINE

## 2019-07-04 PROCEDURE — 1210000000 HC MED SURG R&B

## 2019-07-04 PROCEDURE — 99223 1ST HOSP IP/OBS HIGH 75: CPT | Performed by: INTERNAL MEDICINE

## 2019-07-04 PROCEDURE — 2580000003 HC RX 258: Performed by: NURSE PRACTITIONER

## 2019-07-04 PROCEDURE — 94640 AIRWAY INHALATION TREATMENT: CPT

## 2019-07-04 PROCEDURE — 99222 1ST HOSP IP/OBS MODERATE 55: CPT | Performed by: INTERNAL MEDICINE

## 2019-07-04 PROCEDURE — 94668 MNPJ CHEST WALL SBSQ: CPT

## 2019-07-04 PROCEDURE — 6370000000 HC RX 637 (ALT 250 FOR IP): Performed by: PERSONAL EMERGENCY RESPONSE ATTENDANT

## 2019-07-04 PROCEDURE — 6370000000 HC RX 637 (ALT 250 FOR IP): Performed by: PHYSICIAN ASSISTANT

## 2019-07-04 PROCEDURE — 94667 MNPJ CHEST WALL 1ST: CPT

## 2019-07-04 PROCEDURE — 2700000000 HC OXYGEN THERAPY PER DAY

## 2019-07-04 PROCEDURE — 6360000002 HC RX W HCPCS: Performed by: NURSE PRACTITIONER

## 2019-07-04 RX ORDER — BUDESONIDE 0.5 MG/2ML
0.5 INHALANT ORAL 2 TIMES DAILY
Status: DISCONTINUED | OUTPATIENT
Start: 2019-07-04 | End: 2019-07-08 | Stop reason: HOSPADM

## 2019-07-04 RX ADMIN — IPRATROPIUM BROMIDE AND ALBUTEROL SULFATE 1 AMPULE: 2.5; .5 SOLUTION RESPIRATORY (INHALATION) at 07:16

## 2019-07-04 RX ADMIN — PANTOPRAZOLE SODIUM 40 MG: 40 TABLET, DELAYED RELEASE ORAL at 09:57

## 2019-07-04 RX ADMIN — GUAIFENESIN 600 MG: 600 TABLET, EXTENDED RELEASE ORAL at 20:18

## 2019-07-04 RX ADMIN — HYDROCODONE BITARTRATE AND ACETAMINOPHEN 1 TABLET: 5; 325 TABLET ORAL at 16:50

## 2019-07-04 RX ADMIN — IPRATROPIUM BROMIDE AND ALBUTEROL SULFATE 1 AMPULE: 2.5; .5 SOLUTION RESPIRATORY (INHALATION) at 10:33

## 2019-07-04 RX ADMIN — FOLIC ACID 1 MG: 1 TABLET ORAL at 09:57

## 2019-07-04 RX ADMIN — METHYLPREDNISOLONE SODIUM SUCCINATE 40 MG: 40 INJECTION, POWDER, FOR SOLUTION INTRAMUSCULAR; INTRAVENOUS at 18:10

## 2019-07-04 RX ADMIN — SERTRALINE HYDROCHLORIDE 25 MG: 25 TABLET ORAL at 09:57

## 2019-07-04 RX ADMIN — GUAIFENESIN 600 MG: 600 TABLET, EXTENDED RELEASE ORAL at 09:57

## 2019-07-04 RX ADMIN — IPRATROPIUM BROMIDE AND ALBUTEROL SULFATE 1 AMPULE: 2.5; .5 SOLUTION RESPIRATORY (INHALATION) at 19:40

## 2019-07-04 RX ADMIN — AMLODIPINE BESYLATE 5 MG: 5 TABLET ORAL at 09:57

## 2019-07-04 RX ADMIN — Medication 10 ML: at 09:58

## 2019-07-04 RX ADMIN — METHYLPREDNISOLONE SODIUM SUCCINATE 40 MG: 40 INJECTION, POWDER, FOR SOLUTION INTRAMUSCULAR; INTRAVENOUS at 02:56

## 2019-07-04 RX ADMIN — FLUTICASONE PROPIONATE 2 SPRAY: 50 SPRAY, METERED NASAL at 13:22

## 2019-07-04 RX ADMIN — IPRATROPIUM BROMIDE AND ALBUTEROL SULFATE 1 AMPULE: 2.5; .5 SOLUTION RESPIRATORY (INHALATION) at 15:09

## 2019-07-04 RX ADMIN — BUDESONIDE 500 MCG: 0.5 INHALANT RESPIRATORY (INHALATION) at 19:40

## 2019-07-04 RX ADMIN — MULTIPLE VITAMINS W/ MINERALS TAB 1 TABLET: TAB at 09:57

## 2019-07-04 RX ADMIN — POLYETHYLENE GLYCOL 3350 17 G: 17 POWDER, FOR SOLUTION ORAL at 09:57

## 2019-07-04 RX ADMIN — HYDROCODONE BITARTRATE AND ACETAMINOPHEN 1 TABLET: 5; 325 TABLET ORAL at 03:33

## 2019-07-04 RX ADMIN — METHYLPREDNISOLONE SODIUM SUCCINATE 40 MG: 40 INJECTION, POWDER, FOR SOLUTION INTRAMUSCULAR; INTRAVENOUS at 10:02

## 2019-07-04 RX ADMIN — ENOXAPARIN SODIUM 40 MG: 40 INJECTION SUBCUTANEOUS at 20:18

## 2019-07-04 RX ADMIN — Medication 10 ML: at 20:19

## 2019-07-04 RX ADMIN — ASPIRIN 81 MG 81 MG: 81 TABLET ORAL at 09:57

## 2019-07-04 ASSESSMENT — PAIN SCALES - GENERAL
PAINLEVEL_OUTOF10: 5
PAINLEVEL_OUTOF10: 6

## 2019-07-04 NOTE — PROGRESS NOTES
19  --  16   CREATININE 0.72 0.6 0.53   GLUCOSE 133*  --  137*       MV Settings: ABGs: No results for input(s): PHART, MLL5VLQ, PO2ART, VBW6CHQ, BEART, L0ZXEMRK, JZP9BOS in the last 72 hours.   O2 Device: Nasal cannula  O2 Flow Rate (L/min): 4 L/min  Lab Results   Component Value Date    LACTA 1.1 07/02/2019    LACTA 1.2 04/19/2019    LACTA 1.4 01/01/2019       Radiology  I personally reviewed imaging studies and CT chest shows no PE, severe emphysema, bibasilar atelectasis, right lower lobe masslike infiltrate with no change        Assessment, plan:   Patient is at risk due to    · Acute hypoxic respiratory failure likely secondary to COPD exacerbation  · No clear new infiltrate/pneumonia on CAT scan and procalcitonin is negative  · History of lung cancer currently chemotherapy on hold due to recent pneumonitis  · Chronic respiratory failure on oxygen at home     Recommendation  · Continue Solu-Medrol  · Continue duo nebs  · And budesonide nebs  · No need for antibiotic for this time  · Watch volume status and avoid overload  · O2 to keep sat 88 to 90%  · Incentive spirometry and flutter valve  · Vest 3 times daily    Thank you for consultation    Electronically signed by Aydee Chan MD, [facility],  on 7/4/2019 at 1:56 PM

## 2019-07-05 PROBLEM — Z74.09 IMPAIRED FUNCTIONAL MOBILITY, BALANCE, GAIT, AND ENDURANCE: Chronic | Status: ACTIVE | Noted: 2019-07-05

## 2019-07-05 PROCEDURE — 1210000000 HC MED SURG R&B

## 2019-07-05 PROCEDURE — 6360000002 HC RX W HCPCS: Performed by: INTERNAL MEDICINE

## 2019-07-05 PROCEDURE — 94664 DEMO&/EVAL PT USE INHALER: CPT

## 2019-07-05 PROCEDURE — 6370000000 HC RX 637 (ALT 250 FOR IP): Performed by: PHYSICIAN ASSISTANT

## 2019-07-05 PROCEDURE — 99232 SBSQ HOSP IP/OBS MODERATE 35: CPT | Performed by: INTERNAL MEDICINE

## 2019-07-05 PROCEDURE — 2700000000 HC OXYGEN THERAPY PER DAY

## 2019-07-05 PROCEDURE — 2580000003 HC RX 258: Performed by: NURSE PRACTITIONER

## 2019-07-05 PROCEDURE — 6370000000 HC RX 637 (ALT 250 FOR IP): Performed by: INTERNAL MEDICINE

## 2019-07-05 PROCEDURE — 6370000000 HC RX 637 (ALT 250 FOR IP): Performed by: PERSONAL EMERGENCY RESPONSE ATTENDANT

## 2019-07-05 PROCEDURE — 6360000002 HC RX W HCPCS: Performed by: NURSE PRACTITIONER

## 2019-07-05 PROCEDURE — 94640 AIRWAY INHALATION TREATMENT: CPT

## 2019-07-05 PROCEDURE — 94668 MNPJ CHEST WALL SBSQ: CPT

## 2019-07-05 RX ADMIN — GUAIFENESIN 600 MG: 600 TABLET, EXTENDED RELEASE ORAL at 21:28

## 2019-07-05 RX ADMIN — BUDESONIDE 500 MCG: 0.5 INHALANT RESPIRATORY (INHALATION) at 07:52

## 2019-07-05 RX ADMIN — AMLODIPINE BESYLATE 5 MG: 5 TABLET ORAL at 08:54

## 2019-07-05 RX ADMIN — METHYLPREDNISOLONE SODIUM SUCCINATE 40 MG: 40 INJECTION, POWDER, FOR SOLUTION INTRAMUSCULAR; INTRAVENOUS at 02:22

## 2019-07-05 RX ADMIN — IPRATROPIUM BROMIDE AND ALBUTEROL SULFATE 1 AMPULE: 2.5; .5 SOLUTION RESPIRATORY (INHALATION) at 07:52

## 2019-07-05 RX ADMIN — Medication 10 ML: at 21:56

## 2019-07-05 RX ADMIN — ASPIRIN 81 MG 81 MG: 81 TABLET ORAL at 08:54

## 2019-07-05 RX ADMIN — METHYLPREDNISOLONE SODIUM SUCCINATE 40 MG: 40 INJECTION, POWDER, FOR SOLUTION INTRAMUSCULAR; INTRAVENOUS at 17:19

## 2019-07-05 RX ADMIN — POLYETHYLENE GLYCOL 3350 17 G: 17 POWDER, FOR SOLUTION ORAL at 08:54

## 2019-07-05 RX ADMIN — PANTOPRAZOLE SODIUM 40 MG: 40 TABLET, DELAYED RELEASE ORAL at 08:54

## 2019-07-05 RX ADMIN — HYDROCODONE BITARTRATE AND ACETAMINOPHEN 1 TABLET: 5; 325 TABLET ORAL at 02:27

## 2019-07-05 RX ADMIN — IPRATROPIUM BROMIDE AND ALBUTEROL SULFATE 1 AMPULE: 2.5; .5 SOLUTION RESPIRATORY (INHALATION) at 19:29

## 2019-07-05 RX ADMIN — BUDESONIDE 500 MCG: 0.5 INHALANT RESPIRATORY (INHALATION) at 19:29

## 2019-07-05 RX ADMIN — FLUTICASONE PROPIONATE 2 SPRAY: 50 SPRAY, METERED NASAL at 14:28

## 2019-07-05 RX ADMIN — HYDROCODONE BITARTRATE AND ACETAMINOPHEN 1 TABLET: 5; 325 TABLET ORAL at 21:37

## 2019-07-05 RX ADMIN — Medication 10 ML: at 08:54

## 2019-07-05 RX ADMIN — FOLIC ACID 1 MG: 1 TABLET ORAL at 08:54

## 2019-07-05 RX ADMIN — ENOXAPARIN SODIUM 40 MG: 40 INJECTION SUBCUTANEOUS at 21:29

## 2019-07-05 RX ADMIN — IPRATROPIUM BROMIDE AND ALBUTEROL SULFATE 1 AMPULE: 2.5; .5 SOLUTION RESPIRATORY (INHALATION) at 11:03

## 2019-07-05 RX ADMIN — GUAIFENESIN 600 MG: 600 TABLET, EXTENDED RELEASE ORAL at 08:54

## 2019-07-05 RX ADMIN — MULTIPLE VITAMINS W/ MINERALS TAB 1 TABLET: TAB at 08:54

## 2019-07-05 RX ADMIN — IPRATROPIUM BROMIDE AND ALBUTEROL SULFATE 1 AMPULE: 2.5; .5 SOLUTION RESPIRATORY (INHALATION) at 15:18

## 2019-07-05 RX ADMIN — METHYLPREDNISOLONE SODIUM SUCCINATE 40 MG: 40 INJECTION, POWDER, FOR SOLUTION INTRAMUSCULAR; INTRAVENOUS at 10:40

## 2019-07-05 RX ADMIN — SERTRALINE HYDROCHLORIDE 25 MG: 25 TABLET ORAL at 08:54

## 2019-07-05 RX ADMIN — HYDROCODONE BITARTRATE AND ACETAMINOPHEN 1 TABLET: 5; 325 TABLET ORAL at 17:24

## 2019-07-05 RX ADMIN — SALINE NASAL SPRAY 2 SPRAY: 1.5 SOLUTION NASAL at 19:40

## 2019-07-05 RX ADMIN — HYDROCODONE BITARTRATE AND ACETAMINOPHEN 1 TABLET: 5; 325 TABLET ORAL at 08:54

## 2019-07-05 ASSESSMENT — PAIN SCALES - GENERAL
PAINLEVEL_OUTOF10: 5
PAINLEVEL_OUTOF10: 6
PAINLEVEL_OUTOF10: 5
PAINLEVEL_OUTOF10: 6

## 2019-07-05 NOTE — PROGRESS NOTES
emphysematous change is found within the superior segment of the right lower lobe, just cephalad to the area of biopsy, and January 2019. However, the possibility of residual or recurrent malignancy cannot be entirely excluded. No new lung masses/consolidation/nodules are identified. Other findings discussed All CT scans at this facility use dose modulation, iterative reconstruction, and/or weight based dosing when appropriate to reduce radiation dose to as low as reasonably achievable. Cta Chest W Wo Contrast    Result Date: 7/3/2019  EXAM: CT SCAN OF THE THORAX WITH CONTRAST/PE PROTOCOL/CTA CHEST COMPARISON: NONE AVAILABLE REASON FOR EXAMINATION:  PROGRESSIVE SHORTNESS OF BREATH. MODERATELY DIFFERENTIATED, INVASIVE ADENOCARCINOMA, RIGHT LUNG TECHNIQUE: Helical CTA was performed through the chest utilizing 100 cc of Isovue 370 intravenous contrast.  Images were obtained with bolus tracking in order to opacify the pulmonary arteries. Thick section coronal MIP 3D reconstructions were performed  on a separate workstation. FINDINGS:  No intraluminal filling defects, pulmonary arteries. No intraluminal filling defects, thoracic aorta. Thoracic aorta normal in course and caliber. Cardiac size normal. No pericardial effusion. No hilar, mediastinal, or axillary lymph node enlargement. Right lung again shows marked diffuse emphysematous change. The pleural-based, masslike density, found posteriorly within the superior segment of the right lower lobe, extending anteriorly, tenting the major fissure posteriorly, is again identified, and unchanged in size and contour. Dependent consolidation, anterior inferior to this finding is again identified. Mosaic changes are found within the right middle and right lower lobe. Left lung again shows severe emphysematous change. Dependent subsegmental atelectatic change, posterior left upper lobe, marginated by major fissure, unchanged.  Dependent atelectatic change, posterior left lung

## 2019-07-05 NOTE — PROGRESS NOTES
pack years  []   Pulmonary Disorder  (acute or chronic)  [x]   Severe or Chronic w/ Exacerbation  []     Surgical Status No [x]   Surgeries     General []   Surgery Lower []   Abdominal Thoracic or []   Upper Abdominal Thoracic with  PulmonaryDisorder  []     Chest X-ray Clear/Not  Ordered     [x]  Chronic Changes  Results Pending  []  Infiltrates, atelectasis, pleural effusion, or edema  []  Infiltrates in more than one lobe []  Infiltrate + Atelectasis, &/or pleural effusion  []    Respiratory Pattern Regular,  RR = 12-20 [x]  Increased,  RR = 21-25 []  [place], irregular,  or RR = 26-30 []  Decreased FEV1  or RR = 31-35 []  Severe SOB, use  of accessory muscles, or RR ? 35  []    Mental Status Alert, oriented,  Cooperative [x]  Confused but Follows commands []  Lethargic or unable to follow commands []  Obtunded  []  Comatose  []    Breath Sounds Clear to  auscultation  []  Decreased unilaterally or  in bases only []  Decreased  bilaterally  [x]  Crackles or intermittent wheezes []  Wheezes []    Cough Strong, Spontan., & nonproductive [x]  Strong,  spontaneous, &  productive []  Weak,  Nonproductive []  Weak, productive or  with wheezes []  No spontaneous  cough or may require suctioning []    Level of Activity Ambulatory []  Ambulatory w/ Assist  [x]  Non-ambulatory []  Paraplegic []  Quadriplegic []    Total    Score:___6____     Triage Score:___4_____      Tri       Triage:     1. (>20) Freq: Q3    2. (16-20) Freq: Q4   3. (11-15) Freq: QID & Albuterol Q2 PRN    4. (6-10) Freq: TID & Albuterol Q2 PRN    5. (0-5) Freq Q4prn

## 2019-07-06 LAB
ALBUMIN SERPL-MCNC: 3.2 G/DL (ref 3.5–4.6)
ANION GAP SERPL CALCULATED.3IONS-SCNC: 12 MEQ/L (ref 9–15)
BUN BLDV-MCNC: 26 MG/DL (ref 8–23)
CALCIUM SERPL-MCNC: 9 MG/DL (ref 8.5–9.9)
CHLORIDE BLD-SCNC: 104 MEQ/L (ref 95–107)
CO2: 24 MEQ/L (ref 20–31)
CREAT SERPL-MCNC: 0.55 MG/DL (ref 0.5–0.9)
GFR AFRICAN AMERICAN: >60
GFR NON-AFRICAN AMERICAN: >60
GLUCOSE BLD-MCNC: 150 MG/DL (ref 70–99)
HCT VFR BLD CALC: 35.4 % (ref 37–47)
HEMOGLOBIN: 12.4 G/DL (ref 12–16)
MCH RBC QN AUTO: 34.6 PG (ref 27–31.3)
MCHC RBC AUTO-ENTMCNC: 35 % (ref 33–37)
MCV RBC AUTO: 98.9 FL (ref 82–100)
PDW BLD-RTO: 13.4 % (ref 11.5–14.5)
PHOSPHORUS: 3.4 MG/DL (ref 2.3–4.8)
PLATELET # BLD: 299 K/UL (ref 130–400)
POTASSIUM SERPL-SCNC: 4.4 MEQ/L (ref 3.4–4.9)
RBC # BLD: 3.58 M/UL (ref 4.2–5.4)
SODIUM BLD-SCNC: 140 MEQ/L (ref 135–144)
WBC # BLD: 10.9 K/UL (ref 4.8–10.8)

## 2019-07-06 PROCEDURE — 1210000000 HC MED SURG R&B

## 2019-07-06 PROCEDURE — 6360000002 HC RX W HCPCS: Performed by: NURSE PRACTITIONER

## 2019-07-06 PROCEDURE — 85027 COMPLETE CBC AUTOMATED: CPT

## 2019-07-06 PROCEDURE — 94640 AIRWAY INHALATION TREATMENT: CPT

## 2019-07-06 PROCEDURE — 6360000002 HC RX W HCPCS: Performed by: INTERNAL MEDICINE

## 2019-07-06 PROCEDURE — 36415 COLL VENOUS BLD VENIPUNCTURE: CPT

## 2019-07-06 PROCEDURE — 80069 RENAL FUNCTION PANEL: CPT

## 2019-07-06 PROCEDURE — 94668 MNPJ CHEST WALL SBSQ: CPT

## 2019-07-06 PROCEDURE — 2580000003 HC RX 258: Performed by: NURSE PRACTITIONER

## 2019-07-06 PROCEDURE — 6370000000 HC RX 637 (ALT 250 FOR IP): Performed by: INTERNAL MEDICINE

## 2019-07-06 PROCEDURE — 2700000000 HC OXYGEN THERAPY PER DAY

## 2019-07-06 PROCEDURE — 6370000000 HC RX 637 (ALT 250 FOR IP): Performed by: PHYSICIAN ASSISTANT

## 2019-07-06 PROCEDURE — 99232 SBSQ HOSP IP/OBS MODERATE 35: CPT | Performed by: INTERNAL MEDICINE

## 2019-07-06 PROCEDURE — 6370000000 HC RX 637 (ALT 250 FOR IP): Performed by: PERSONAL EMERGENCY RESPONSE ATTENDANT

## 2019-07-06 RX ADMIN — IPRATROPIUM BROMIDE AND ALBUTEROL SULFATE 1 AMPULE: 2.5; .5 SOLUTION RESPIRATORY (INHALATION) at 11:15

## 2019-07-06 RX ADMIN — METHYLPREDNISOLONE SODIUM SUCCINATE 40 MG: 40 INJECTION, POWDER, FOR SOLUTION INTRAMUSCULAR; INTRAVENOUS at 01:43

## 2019-07-06 RX ADMIN — METHYLPREDNISOLONE SODIUM SUCCINATE 40 MG: 40 INJECTION, POWDER, FOR SOLUTION INTRAMUSCULAR; INTRAVENOUS at 17:53

## 2019-07-06 RX ADMIN — Medication 10 ML: at 22:43

## 2019-07-06 RX ADMIN — HYDROCODONE BITARTRATE AND ACETAMINOPHEN 1 TABLET: 5; 325 TABLET ORAL at 11:37

## 2019-07-06 RX ADMIN — BUDESONIDE 500 MCG: 0.5 INHALANT RESPIRATORY (INHALATION) at 07:28

## 2019-07-06 RX ADMIN — Medication 10 ML: at 17:53

## 2019-07-06 RX ADMIN — ASPIRIN 81 MG 81 MG: 81 TABLET ORAL at 08:55

## 2019-07-06 RX ADMIN — HYDROCODONE BITARTRATE AND ACETAMINOPHEN 1 TABLET: 5; 325 TABLET ORAL at 05:06

## 2019-07-06 RX ADMIN — Medication 10 ML: at 08:56

## 2019-07-06 RX ADMIN — METHYLPREDNISOLONE SODIUM SUCCINATE 40 MG: 40 INJECTION, POWDER, FOR SOLUTION INTRAMUSCULAR; INTRAVENOUS at 09:01

## 2019-07-06 RX ADMIN — GUAIFENESIN 600 MG: 600 TABLET, EXTENDED RELEASE ORAL at 22:42

## 2019-07-06 RX ADMIN — GUAIFENESIN 600 MG: 600 TABLET, EXTENDED RELEASE ORAL at 08:55

## 2019-07-06 RX ADMIN — IPRATROPIUM BROMIDE AND ALBUTEROL SULFATE 1 AMPULE: 2.5; .5 SOLUTION RESPIRATORY (INHALATION) at 20:23

## 2019-07-06 RX ADMIN — FOLIC ACID 1 MG: 1 TABLET ORAL at 08:55

## 2019-07-06 RX ADMIN — IPRATROPIUM BROMIDE AND ALBUTEROL SULFATE 1 AMPULE: 2.5; .5 SOLUTION RESPIRATORY (INHALATION) at 07:28

## 2019-07-06 RX ADMIN — FLUTICASONE PROPIONATE 2 SPRAY: 50 SPRAY, METERED NASAL at 14:40

## 2019-07-06 RX ADMIN — BUDESONIDE 500 MCG: 0.5 INHALANT RESPIRATORY (INHALATION) at 20:23

## 2019-07-06 RX ADMIN — MULTIPLE VITAMINS W/ MINERALS TAB 1 TABLET: TAB at 08:55

## 2019-07-06 RX ADMIN — IPRATROPIUM BROMIDE AND ALBUTEROL SULFATE 1 AMPULE: 2.5; .5 SOLUTION RESPIRATORY (INHALATION) at 15:26

## 2019-07-06 RX ADMIN — AMLODIPINE BESYLATE 5 MG: 5 TABLET ORAL at 08:55

## 2019-07-06 RX ADMIN — ENOXAPARIN SODIUM 40 MG: 40 INJECTION SUBCUTANEOUS at 22:42

## 2019-07-06 RX ADMIN — PANTOPRAZOLE SODIUM 40 MG: 40 TABLET, DELAYED RELEASE ORAL at 09:03

## 2019-07-06 RX ADMIN — SERTRALINE HYDROCHLORIDE 25 MG: 25 TABLET ORAL at 08:55

## 2019-07-06 RX ADMIN — POLYETHYLENE GLYCOL 3350 17 G: 17 POWDER, FOR SOLUTION ORAL at 08:55

## 2019-07-06 RX ADMIN — HYDROCODONE BITARTRATE AND ACETAMINOPHEN 1 TABLET: 5; 325 TABLET ORAL at 17:53

## 2019-07-06 ASSESSMENT — PAIN SCALES - GENERAL
PAINLEVEL_OUTOF10: 6
PAINLEVEL_OUTOF10: 6
PAINLEVEL_OUTOF10: 2
PAINLEVEL_OUTOF10: 3
PAINLEVEL_OUTOF10: 6

## 2019-07-06 ASSESSMENT — PAIN DESCRIPTION - PAIN TYPE: TYPE: ACUTE PAIN

## 2019-07-06 NOTE — PROGRESS NOTES
INPATIENT PROGRESS NOTES    PATIENT NAME: Titi Musa  MRN: [id number]  SERVICE DATE:  2019   SERVICE TIME:  11:50 AM      PRIMARY SERVICE: Pulmonary Disease    CHIEF COMPLAINTS: SOB     INTERVAL HPI: Patient seen and examined at bedside, Interval Notes, orders reviewed. Nursing notes noted    Feeling better, still has shortness of breath on exertion but improved, cough productive of clear phlegm, no chest pain, no fever, no chills, no nausea no vomiting, she slept well  Review of system:     GI Abdominal pain No  Skin Rash No    Social History     Tobacco Use    Smoking status: Former Smoker     Packs/day: 1.00     Years: 60.00     Pack years: 60.00     Types: Cigarettes     Start date: 1/10/1964     Last attempt to quit: 2019     Years since quittin.5    Smokeless tobacco: Never Used    Tobacco comment: quit in 2019   Substance Use Topics    Alcohol use: Not Currently     Alcohol/week: 9.0 oz     Types: 15 Cans of beer per week     Comment: per pt has not had any since Dec 28 2018         Problem Relation Age of Onset    Stroke Mother     Emphysema Father          OBJECTIVE    Body mass index is 18.11 kg/m². PHYSICAL EXAM:  Vitals:  /73   Pulse 102   Temp 98.2 °F (36.8 °C) (Oral)   Resp 20   Ht 5' 2\" (1.575 m)   Wt 99 lb (44.9 kg)   SpO2 (!) 88%   BMI 18.11 kg/m²     General: alert, cooperative, no distress  Head: normocephalic, atraumatic  Eyes:No gross abnormalities. , PERRL and Sclera nonicteric  ENT:  MMM no lesions  Neck:  supple and no masses  Chest :good air movement, no wheezing, no rales, nontender, tympanic  Heart[de-identified] Heart sounds are normal.  Regular rate and rhythm without murmur, gallop or rub. ABD:  symmetric, liver span normal to percussion, soft, non-tender, spleen non-palpable  Musculoskeletal : no cyanosis, no clubbing and no edema  Neuro:  Grossly normal  Skin: No rashes or nodules noted.   Lymph node:  no cervical nodes  Urology: No [facility]: appropriate    DATA:   Recent Labs     07/06/19  0713   WBC 10.9*   HGB 12.4   HCT 35.4*   MCV 98.9        Recent Labs     07/06/19  0713      K 4.4      CO2 24   BUN 26*   CREATININE 0.55   GLUCOSE 150*   CALCIUM 9.0   LABALBU 3.2*   LABGLOM >60.0   GFRAA >60.0       MV Settings:     FiO2 : 50 %    No results for input(s): PHART, KYB8MCN, PO2ART, TTV8DHM, BEART, J5XDRSVV in the last 72 hours. O2 Device: Nasal cannula  O2 Flow Rate (L/min): 4 L/min    DIET GENERAL;  Dietary Nutrition Supplements: Clear Liquid Oral Supplement  Dietary Nutrition Supplements: Standard High Calorie Oral Supplement     MEDICATIONS during current hospitalization:    Continuous Infusions:    Scheduled Meds:   budesonide  0.5 mg Nebulization BID    aspirin  81 mg Oral Daily    fluticasone  2 spray Each Nostril Daily    folic acid  1 mg Oral Daily    guaiFENesin  600 mg Oral BID    therapeutic multivitamin-minerals  1 tablet Oral Daily    sertraline  25 mg Oral Daily    pantoprazole  40 mg Oral Daily    amLODIPine  5 mg Oral Daily    polyethylene glycol  17 g Oral Daily    sodium chloride flush  10 mL Intravenous 2 times per day    enoxaparin  40 mg Subcutaneous Daily    ipratropium-albuterol  1 ampule Inhalation 4x daily    methylPREDNISolone  40 mg Intravenous Q8H       PRN Meds:sodium chloride, HYDROcodone-acetaminophen, sodium chloride flush, magnesium hydroxide, ondansetron, acetaminophen, albuterol    Radiology  Ct Chest W Contrast    Result Date: 6/27/2019  CT of the Chest with intravenous contrast medium History: Moderately differentiated invasive adenocarcinoma, right middle lobe, with left lung metastasis. Technical Factors: CT imaging of the chest was obtained and formatted as 5 mm contiguous axial images from the thoracic inlet through the adrenal glands. Sagittal coronal reconstruction obtained during postprocessing.  Intravenous contrast medium:  Isovue-370, 100 mL Comparison: modulation, iterative reconstruction, and/or weight based dosing when appropriate to reduce radiation dose to as low as reasonably achievable. Cta Chest W Wo Contrast    Result Date: 7/3/2019  EXAM: CT SCAN OF THE THORAX WITH CONTRAST/PE PROTOCOL/CTA CHEST COMPARISON: NONE AVAILABLE REASON FOR EXAMINATION:  PROGRESSIVE SHORTNESS OF BREATH. MODERATELY DIFFERENTIATED, INVASIVE ADENOCARCINOMA, RIGHT LUNG TECHNIQUE: Helical CTA was performed through the chest utilizing 100 cc of Isovue 370 intravenous contrast.  Images were obtained with bolus tracking in order to opacify the pulmonary arteries. Thick section coronal MIP 3D reconstructions were performed  on a separate workstation. FINDINGS:  No intraluminal filling defects, pulmonary arteries. No intraluminal filling defects, thoracic aorta. Thoracic aorta normal in course and caliber. Cardiac size normal. No pericardial effusion. No hilar, mediastinal, or axillary lymph node enlargement. Right lung again shows marked diffuse emphysematous change. The pleural-based, masslike density, found posteriorly within the superior segment of the right lower lobe, extending anteriorly, tenting the major fissure posteriorly, is again identified, and unchanged in size and contour. Dependent consolidation, anterior inferior to this finding is again identified. Mosaic changes are found within the right middle and right lower lobe. Left lung again shows severe emphysematous change. Dependent subsegmental atelectatic change, posterior left upper lobe, marginated by major fissure, unchanged. Dependent atelectatic change, posterior left lung base, again identified. Mosaic changes, base of lingula, demonstrated, and unchanged. Limited imaging upper abdomen shows adrenal glands without anomaly. The cystic areas previously found within the left hepatic lobe, are again identified, and are unchanged. No osteoblastic, no osteolytic lesions. No CT evidence, pulmonary embolism.  No CT

## 2019-07-06 NOTE — PROGRESS NOTES
Shift assessment complete. Gave pt a walker to help her ambulate to the bathroom. She tolerated ambulation well with minimal SOB on return back to bed. Pt rating pain 6/10, medicated with Norco. Will continue to monitor. Call light within reach.     E. Electronically signed by Bud Estrella RN on 7/6/2019 at 12:28 AM

## 2019-07-07 LAB
ALBUMIN SERPL-MCNC: 3.4 G/DL (ref 3.5–4.6)
ANION GAP SERPL CALCULATED.3IONS-SCNC: 13 MEQ/L (ref 9–15)
BLOOD CULTURE, ROUTINE: NORMAL
BUN BLDV-MCNC: 20 MG/DL (ref 8–23)
CALCIUM SERPL-MCNC: 9.1 MG/DL (ref 8.5–9.9)
CHLORIDE BLD-SCNC: 104 MEQ/L (ref 95–107)
CO2: 23 MEQ/L (ref 20–31)
CREAT SERPL-MCNC: 0.55 MG/DL (ref 0.5–0.9)
CULTURE, BLOOD 2: NORMAL
GFR AFRICAN AMERICAN: >60
GFR NON-AFRICAN AMERICAN: >60
GLUCOSE BLD-MCNC: 165 MG/DL (ref 70–99)
PHOSPHORUS: 3.8 MG/DL (ref 2.3–4.8)
POTASSIUM SERPL-SCNC: 4.4 MEQ/L (ref 3.4–4.9)
SODIUM BLD-SCNC: 140 MEQ/L (ref 135–144)

## 2019-07-07 PROCEDURE — 2580000003 HC RX 258: Performed by: NURSE PRACTITIONER

## 2019-07-07 PROCEDURE — 2700000000 HC OXYGEN THERAPY PER DAY

## 2019-07-07 PROCEDURE — 99232 SBSQ HOSP IP/OBS MODERATE 35: CPT | Performed by: INTERNAL MEDICINE

## 2019-07-07 PROCEDURE — 94640 AIRWAY INHALATION TREATMENT: CPT

## 2019-07-07 PROCEDURE — 99225 PR SBSQ OBSERVATION CARE/DAY 25 MINUTES: CPT | Performed by: INTERNAL MEDICINE

## 2019-07-07 PROCEDURE — 6360000002 HC RX W HCPCS: Performed by: INTERNAL MEDICINE

## 2019-07-07 PROCEDURE — 1210000000 HC MED SURG R&B

## 2019-07-07 PROCEDURE — 94668 MNPJ CHEST WALL SBSQ: CPT

## 2019-07-07 PROCEDURE — 6370000000 HC RX 637 (ALT 250 FOR IP): Performed by: PERSONAL EMERGENCY RESPONSE ATTENDANT

## 2019-07-07 PROCEDURE — 80069 RENAL FUNCTION PANEL: CPT

## 2019-07-07 PROCEDURE — 36415 COLL VENOUS BLD VENIPUNCTURE: CPT

## 2019-07-07 PROCEDURE — 94761 N-INVAS EAR/PLS OXIMETRY MLT: CPT

## 2019-07-07 PROCEDURE — 6370000000 HC RX 637 (ALT 250 FOR IP): Performed by: INTERNAL MEDICINE

## 2019-07-07 PROCEDURE — 6370000000 HC RX 637 (ALT 250 FOR IP): Performed by: PHYSICIAN ASSISTANT

## 2019-07-07 PROCEDURE — 6360000002 HC RX W HCPCS: Performed by: NURSE PRACTITIONER

## 2019-07-07 RX ORDER — PREDNISONE 10 MG/1
40 TABLET ORAL DAILY
Status: DISCONTINUED | OUTPATIENT
Start: 2019-07-07 | End: 2019-07-08 | Stop reason: HOSPADM

## 2019-07-07 RX ADMIN — HYDROCODONE BITARTRATE AND ACETAMINOPHEN 1 TABLET: 5; 325 TABLET ORAL at 20:24

## 2019-07-07 RX ADMIN — ENOXAPARIN SODIUM 40 MG: 40 INJECTION SUBCUTANEOUS at 20:24

## 2019-07-07 RX ADMIN — BUDESONIDE 500 MCG: 0.5 INHALANT RESPIRATORY (INHALATION) at 19:28

## 2019-07-07 RX ADMIN — GUAIFENESIN 600 MG: 600 TABLET, EXTENDED RELEASE ORAL at 20:24

## 2019-07-07 RX ADMIN — PANTOPRAZOLE SODIUM 40 MG: 40 TABLET, DELAYED RELEASE ORAL at 05:34

## 2019-07-07 RX ADMIN — IPRATROPIUM BROMIDE AND ALBUTEROL SULFATE 1 AMPULE: 2.5; .5 SOLUTION RESPIRATORY (INHALATION) at 11:28

## 2019-07-07 RX ADMIN — IPRATROPIUM BROMIDE AND ALBUTEROL SULFATE 1 AMPULE: 2.5; .5 SOLUTION RESPIRATORY (INHALATION) at 19:30

## 2019-07-07 RX ADMIN — MULTIPLE VITAMINS W/ MINERALS TAB 1 TABLET: TAB at 08:35

## 2019-07-07 RX ADMIN — SERTRALINE HYDROCHLORIDE 25 MG: 25 TABLET ORAL at 08:35

## 2019-07-07 RX ADMIN — HYDROCODONE BITARTRATE AND ACETAMINOPHEN 1 TABLET: 5; 325 TABLET ORAL at 01:48

## 2019-07-07 RX ADMIN — Medication 10 ML: at 20:24

## 2019-07-07 RX ADMIN — IPRATROPIUM BROMIDE AND ALBUTEROL SULFATE 1 AMPULE: 2.5; .5 SOLUTION RESPIRATORY (INHALATION) at 16:07

## 2019-07-07 RX ADMIN — BUDESONIDE 500 MCG: 0.5 INHALANT RESPIRATORY (INHALATION) at 07:36

## 2019-07-07 RX ADMIN — METHYLPREDNISOLONE SODIUM SUCCINATE 40 MG: 40 INJECTION, POWDER, FOR SOLUTION INTRAMUSCULAR; INTRAVENOUS at 01:48

## 2019-07-07 RX ADMIN — Medication 10 ML: at 08:35

## 2019-07-07 RX ADMIN — METHYLPREDNISOLONE SODIUM SUCCINATE 40 MG: 40 INJECTION, POWDER, FOR SOLUTION INTRAMUSCULAR; INTRAVENOUS at 08:35

## 2019-07-07 RX ADMIN — IPRATROPIUM BROMIDE AND ALBUTEROL SULFATE 1 AMPULE: 2.5; .5 SOLUTION RESPIRATORY (INHALATION) at 07:36

## 2019-07-07 RX ADMIN — POLYETHYLENE GLYCOL 3350 17 G: 17 POWDER, FOR SOLUTION ORAL at 08:35

## 2019-07-07 RX ADMIN — HYDROCODONE BITARTRATE AND ACETAMINOPHEN 1 TABLET: 5; 325 TABLET ORAL at 14:30

## 2019-07-07 RX ADMIN — AMLODIPINE BESYLATE 5 MG: 5 TABLET ORAL at 08:35

## 2019-07-07 RX ADMIN — FOLIC ACID 1 MG: 1 TABLET ORAL at 08:35

## 2019-07-07 RX ADMIN — HYDROCODONE BITARTRATE AND ACETAMINOPHEN 1 TABLET: 5; 325 TABLET ORAL at 08:35

## 2019-07-07 RX ADMIN — GUAIFENESIN 600 MG: 600 TABLET, EXTENDED RELEASE ORAL at 08:35

## 2019-07-07 RX ADMIN — ASPIRIN 81 MG 81 MG: 81 TABLET ORAL at 08:35

## 2019-07-07 RX ADMIN — FLUTICASONE PROPIONATE 2 SPRAY: 50 SPRAY, METERED NASAL at 14:30

## 2019-07-07 ASSESSMENT — PAIN SCALES - GENERAL
PAINLEVEL_OUTOF10: 6
PAINLEVEL_OUTOF10: 2
PAINLEVEL_OUTOF10: 5
PAINLEVEL_OUTOF10: 6
PAINLEVEL_OUTOF10: 0
PAINLEVEL_OUTOF10: 2
PAINLEVEL_OUTOF10: 6
PAINLEVEL_OUTOF10: 6

## 2019-07-07 NOTE — PROGRESS NOTES
INPATIENT PROGRESS NOTES    PATIENT NAME: Rosanna Burris  MRN: [id number]  SERVICE DATE:  2019   SERVICE TIME:  11:45 AM      PRIMARY SERVICE: Pulmonary Disease    CHIEF COMPLAINTS: SOB     INTERVAL HPI: Patient seen and examined at bedside, Interval Notes, orders reviewed. Nursing notes noted    Patient improved, feels better, shortness of breath is less mainly with exertion but close to baseline, able to cough and clear secretions with vest, no hemoptysis, no chest pain    GI Abdominal pain No  Skin Rash No    Social History     Tobacco Use    Smoking status: Former Smoker     Packs/day: 1.00     Years: 60.00     Pack years: 60.00     Types: Cigarettes     Start date: 1/10/1964     Last attempt to quit: 2019     Years since quittin.5    Smokeless tobacco: Never Used    Tobacco comment: quit in 2019   Substance Use Topics    Alcohol use: Not Currently     Alcohol/week: 9.0 oz     Types: 15 Cans of beer per week     Comment: per pt has not had any since Dec 28 2018         Problem Relation Age of Onset    Stroke Mother     Emphysema Father          OBJECTIVE    Body mass index is 18.11 kg/m². PHYSICAL EXAM:  Vitals:  BP (!) 143/80   Pulse 87   Temp 98.1 °F (36.7 °C) (Oral)   Resp 18   Ht 5' 2\" (1.575 m)   Wt 99 lb (44.9 kg)   SpO2 96%   BMI 18.11 kg/m²     General: alert, cooperative, no distress  Head: normocephalic, atraumatic  Eyes:No gross abnormalities. , PERRL and Sclera nonicteric  ENT:  MMM no lesions  Neck:  supple and no masses  Chest :good air movement, no wheezing, no rales, nontender, tympanic  Heart[de-identified] Heart sounds are normal.  Regular rate and rhythm without murmur, gallop or rub. ABD:  symmetric, liver span normal to percussion, soft, non-tender, spleen non-palpable  Musculoskeletal : no cyanosis, no clubbing and no edema  Neuro:  Grossly normal  Skin: No rashes or nodules noted.   Lymph node:  no cervical nodes  Urology: No [facility]   Psychiatric: appropriate    DATA:

## 2019-07-07 NOTE — PROGRESS NOTES
Hospitalist Progress Note  7/7/2019 3:39 PM    Assessment and Plan:   1. Acute hypoxic respiratory failure due to COPD exacerbation in setting of R Lung CA with mets to Left Lung with Pulmonary Fibrosis, present on admission: Clinically improving, has been able to ambulate but continues to have drop in her oxygen levels, switch to oral steroids, continue chest vest Acapella, Incentive spirometry, Duonebs Q4hr, Guaneficine. Supplemental O2 with goal SPO2 of 92% or greater. Wean down O2 if/when possible. 2. Moderate protein calorie malnutrition: Encourage p.o. intake, continue supplemental suppor  3. generalized weakness Gait instability and Decreased Functional Status: Full precautions with PT/OT  4. History of lung cancer, follow-up with oncology as an outpatient    Advance Directive: Full Code     DVT prophylaxis: Chemical prophylaxis SQ    Additionally, the following hospital problems were addressed: Active Problems:    COPD exacerbation ([address]    Malignant neoplasm of middle lobe of right lung ([address]    Secondary malignant neoplasm of left lung (HCC)    Pulmonary fibrosis ([facility])    COPD with acute exacerbation (HCC)    Moderate malnutrition ([address]    Malignant neoplasm of upper lobe of right lung ([address]    Pneumonia due to organism    Impaired functional mobility, balance, gait, and endurance  Resolved Problems:    * No resolved hospital problems. *        Subjective:   Admit Date: 7/2/2019  PCP: DERECK Gates    Patient feels better, she was seen ambulating the hallway, oxygen saturation was around 86% when she went back to bed. She has been able to expectorate more amounts of sputum and has been feeling better. Denies any fever or chills, chest pain, nausea or vomiting.     Objective:     Vitals:    07/06/19 2025 07/07/19 0743 07/07/19 0801 07/07/19 1132   BP:   (!) 143/80    Pulse:   87    Resp:   18    Temp:   98.1 °F (36.7 °C)    TempSrc:   Oral    SpO2: 94% 98% 95% 96%   Weight:       Height:

## 2019-07-07 NOTE — ONCOLOGY
INTAKE/OUTPUT:      Intake/Output Summary (Last 24 hours) at 7/7/2019 0832  Last data filed at 7/7/2019 0535  Gross per 24 hour   Intake 1090 ml   Output 800 ml   Net 290 ml     CONSTITUTIONAL:  awake, alert, cooperative, no apparent distress, and appears stated age  NECK:  Supple, symmetrical.  LUNGS:  Some rhonchi.g  CARDIOVASCULAR:  Normal apical impulse, regular rate and rhythm, normal S1 and S2, no S3 or S4, and no murmur noted  ABDOMEN:  No scars, normal bowel sounds, soft, non-distended, non-tender, no masses palpated, no hepatosplenomegally  MUSCULOSKELETAL:  There is no redness, warmth, or swelling of the joints. Full range of motion noted. Motor strength is 5 out of 5 all extremities bilaterally. Tone is normal.  NEUROLOGIC:  Awake, alert, oriented to name, place and time. Cranial nerves II-XII are grossly intact. Motor is 5 out of 5 bilaterally.   SKIN:  no bruising or bleeding and no rashes    DATA:      PT/INR:  No results found for: PTINR  PTT:    Lab Results   Component Value Date    APTT 27.3 07/02/2019     CMP:    Lab Results   Component Value Date     07/07/2019    K 4.4 07/07/2019    K 4.0 07/03/2019     07/07/2019    CO2 23 07/07/2019    BUN 20 07/07/2019    PROT 6.3 07/03/2019     Magnesium:    Lab Results   Component Value Date    MG 1.6 07/02/2019     Phosphorus:  No components found for: PO4  Calcium:  No components found for: CA  CBC:    Lab Results   Component Value Date    WBC 10.9 07/06/2019    RBC 3.58 07/06/2019    HGB 12.4 07/06/2019    HCT 35.4 07/06/2019    MCV 98.9 07/06/2019    RDW 13.4 07/06/2019     07/06/2019     DIFF:    Lab Results   Component Value Date    MCV 98.9 07/06/2019    RDW 13.4 07/06/2019      LDH:  No results found for: LDH  Uric Acid:  No components found for: URIC    CBC with Differential:    Lab Results   Component Value Date    WBC 10.9 07/06/2019    RBC 3.58 07/06/2019    HGB 12.4 07/06/2019    HCT 35.4 07/06/2019     07/06/2019

## 2019-07-08 ENCOUNTER — HOSPITAL ENCOUNTER (INPATIENT)
Age: 74
LOS: 15 days | Discharge: HOSPICE | DRG: 190 | End: 2019-07-23
Attending: INTERNAL MEDICINE | Admitting: INTERNAL MEDICINE
Payer: MEDICARE

## 2019-07-08 VITALS
TEMPERATURE: 97.9 F | OXYGEN SATURATION: 92 % | DIASTOLIC BLOOD PRESSURE: 71 MMHG | HEIGHT: 62 IN | SYSTOLIC BLOOD PRESSURE: 112 MMHG | BODY MASS INDEX: 18.22 KG/M2 | HEART RATE: 91 BPM | WEIGHT: 99 LBS | RESPIRATION RATE: 18 BRPM

## 2019-07-08 DIAGNOSIS — J44.1 COPD EXACERBATION (HCC): Primary | ICD-10-CM

## 2019-07-08 DIAGNOSIS — C34.2 MALIGNANT NEOPLASM OF MIDDLE LOBE OF RIGHT LUNG (HCC): ICD-10-CM

## 2019-07-08 LAB
ALBUMIN SERPL-MCNC: 3.2 G/DL (ref 3.5–4.6)
ANION GAP SERPL CALCULATED.3IONS-SCNC: 11 MEQ/L (ref 9–15)
BUN BLDV-MCNC: 20 MG/DL (ref 8–23)
CALCIUM SERPL-MCNC: 9 MG/DL (ref 8.5–9.9)
CHLORIDE BLD-SCNC: 99 MEQ/L (ref 95–107)
CO2: 25 MEQ/L (ref 20–31)
CREAT SERPL-MCNC: 0.51 MG/DL (ref 0.5–0.9)
GFR AFRICAN AMERICAN: >60
GFR NON-AFRICAN AMERICAN: >60
GLUCOSE BLD-MCNC: 92 MG/DL (ref 70–99)
PHOSPHORUS: 2.8 MG/DL (ref 2.3–4.8)
POTASSIUM SERPL-SCNC: 4 MEQ/L (ref 3.4–4.9)
SODIUM BLD-SCNC: 135 MEQ/L (ref 135–144)

## 2019-07-08 PROCEDURE — 94667 MNPJ CHEST WALL 1ST: CPT

## 2019-07-08 PROCEDURE — 6360000002 HC RX W HCPCS: Performed by: INTERNAL MEDICINE

## 2019-07-08 PROCEDURE — 99232 SBSQ HOSP IP/OBS MODERATE 35: CPT | Performed by: INTERNAL MEDICINE

## 2019-07-08 PROCEDURE — 2700000000 HC OXYGEN THERAPY PER DAY

## 2019-07-08 PROCEDURE — 97165 OT EVAL LOW COMPLEX 30 MIN: CPT

## 2019-07-08 PROCEDURE — 80069 RENAL FUNCTION PANEL: CPT

## 2019-07-08 PROCEDURE — 97162 PT EVAL MOD COMPLEX 30 MIN: CPT

## 2019-07-08 PROCEDURE — 36415 COLL VENOUS BLD VENIPUNCTURE: CPT

## 2019-07-08 PROCEDURE — 94640 AIRWAY INHALATION TREATMENT: CPT

## 2019-07-08 PROCEDURE — 6370000000 HC RX 637 (ALT 250 FOR IP): Performed by: PHYSICIAN ASSISTANT

## 2019-07-08 PROCEDURE — 6370000000 HC RX 637 (ALT 250 FOR IP): Performed by: INTERNAL MEDICINE

## 2019-07-08 PROCEDURE — 1200000002 HC SEMI PRIVATE SWING BED

## 2019-07-08 PROCEDURE — 94664 DEMO&/EVAL PT USE INHALER: CPT

## 2019-07-08 PROCEDURE — 2580000003 HC RX 258: Performed by: NURSE PRACTITIONER

## 2019-07-08 PROCEDURE — 6370000000 HC RX 637 (ALT 250 FOR IP): Performed by: PERSONAL EMERGENCY RESPONSE ATTENDANT

## 2019-07-08 PROCEDURE — 94668 MNPJ CHEST WALL SBSQ: CPT

## 2019-07-08 RX ORDER — AMLODIPINE BESYLATE 5 MG/1
5 TABLET ORAL DAILY
Status: DISCONTINUED | OUTPATIENT
Start: 2019-07-09 | End: 2019-07-08

## 2019-07-08 RX ORDER — PANTOPRAZOLE SODIUM 40 MG/1
40 TABLET, DELAYED RELEASE ORAL DAILY
Status: CANCELLED | OUTPATIENT
Start: 2019-07-09

## 2019-07-08 RX ORDER — ASPIRIN 81 MG/1
81 TABLET, CHEWABLE ORAL DAILY
Status: CANCELLED | OUTPATIENT
Start: 2019-07-09

## 2019-07-08 RX ORDER — SODIUM CHLORIDE 0.9 % (FLUSH) 0.9 %
10 SYRINGE (ML) INJECTION EVERY 12 HOURS SCHEDULED
Status: DISCONTINUED | OUTPATIENT
Start: 2019-07-08 | End: 2019-07-12 | Stop reason: ALTCHOICE

## 2019-07-08 RX ORDER — GUAIFENESIN 600 MG/1
600 TABLET, EXTENDED RELEASE ORAL 2 TIMES DAILY
Status: DISCONTINUED | OUTPATIENT
Start: 2019-07-08 | End: 2019-07-23 | Stop reason: HOSPADM

## 2019-07-08 RX ORDER — SODIUM CHLORIDE 0.9 % (FLUSH) 0.9 %
10 SYRINGE (ML) INJECTION PRN
Status: CANCELLED | OUTPATIENT
Start: 2019-07-08

## 2019-07-08 RX ORDER — BUDESONIDE 0.5 MG/2ML
0.5 INHALANT ORAL 2 TIMES DAILY
Status: CANCELLED | OUTPATIENT
Start: 2019-07-08

## 2019-07-08 RX ORDER — ACETAMINOPHEN 325 MG/1
650 TABLET ORAL EVERY 4 HOURS PRN
Status: DISCONTINUED | OUTPATIENT
Start: 2019-07-08 | End: 2019-07-23 | Stop reason: HOSPADM

## 2019-07-08 RX ORDER — ONDANSETRON 2 MG/ML
4 INJECTION INTRAMUSCULAR; INTRAVENOUS EVERY 6 HOURS PRN
Status: CANCELLED | OUTPATIENT
Start: 2019-07-08

## 2019-07-08 RX ORDER — BUDESONIDE 0.5 MG/2ML
0.5 INHALANT ORAL 2 TIMES DAILY
Status: DISCONTINUED | OUTPATIENT
Start: 2019-07-08 | End: 2019-07-23 | Stop reason: HOSPADM

## 2019-07-08 RX ORDER — POLYETHYLENE GLYCOL 3350 17 G/17G
17 POWDER, FOR SOLUTION ORAL DAILY
Status: CANCELLED | OUTPATIENT
Start: 2019-07-09

## 2019-07-08 RX ORDER — ONDANSETRON 2 MG/ML
4 INJECTION INTRAMUSCULAR; INTRAVENOUS EVERY 6 HOURS PRN
Status: DISCONTINUED | OUTPATIENT
Start: 2019-07-08 | End: 2019-07-23 | Stop reason: HOSPADM

## 2019-07-08 RX ORDER — GUAIFENESIN 600 MG/1
600 TABLET, EXTENDED RELEASE ORAL 2 TIMES DAILY
Status: CANCELLED | OUTPATIENT
Start: 2019-07-08

## 2019-07-08 RX ORDER — SODIUM CHLORIDE 0.9 % (FLUSH) 0.9 %
10 SYRINGE (ML) INJECTION PRN
Status: DISCONTINUED | OUTPATIENT
Start: 2019-07-08 | End: 2019-07-12 | Stop reason: ALTCHOICE

## 2019-07-08 RX ORDER — PREDNISONE 10 MG/1
TABLET ORAL
Qty: 40 TABLET | Refills: 0 | Status: ON HOLD | OUTPATIENT
Start: 2019-07-08 | End: 2019-07-23 | Stop reason: HOSPADM

## 2019-07-08 RX ORDER — FOLIC ACID 1 MG/1
1 TABLET ORAL DAILY
Status: DISCONTINUED | OUTPATIENT
Start: 2019-07-09 | End: 2019-07-23 | Stop reason: HOSPADM

## 2019-07-08 RX ORDER — HYDROCODONE BITARTRATE AND ACETAMINOPHEN 5; 325 MG/1; MG/1
1 TABLET ORAL EVERY 6 HOURS PRN
Status: DISCONTINUED | OUTPATIENT
Start: 2019-07-08 | End: 2019-07-23 | Stop reason: HOSPADM

## 2019-07-08 RX ORDER — M-VIT,TX,IRON,MINS/CALC/FOLIC 27MG-0.4MG
1 TABLET ORAL DAILY
Status: DISCONTINUED | OUTPATIENT
Start: 2019-07-09 | End: 2019-07-23 | Stop reason: HOSPADM

## 2019-07-08 RX ORDER — SODIUM CHLORIDE 0.9 % (FLUSH) 0.9 %
10 SYRINGE (ML) INJECTION EVERY 12 HOURS SCHEDULED
Status: CANCELLED | OUTPATIENT
Start: 2019-07-08

## 2019-07-08 RX ORDER — FLUTICASONE PROPIONATE 50 MCG
2 SPRAY, SUSPENSION (ML) NASAL DAILY
Status: DISCONTINUED | OUTPATIENT
Start: 2019-07-09 | End: 2019-07-23 | Stop reason: HOSPADM

## 2019-07-08 RX ORDER — ASPIRIN 81 MG/1
81 TABLET, CHEWABLE ORAL DAILY
Status: DISCONTINUED | OUTPATIENT
Start: 2019-07-09 | End: 2019-07-23 | Stop reason: HOSPADM

## 2019-07-08 RX ORDER — BUDESONIDE AND FORMOTEROL FUMARATE DIHYDRATE 160; 4.5 UG/1; UG/1
2 AEROSOL RESPIRATORY (INHALATION) 2 TIMES DAILY
Qty: 3 INHALER | Refills: 1 | Status: SHIPPED | OUTPATIENT
Start: 2019-07-08

## 2019-07-08 RX ORDER — ACETAMINOPHEN 325 MG/1
650 TABLET ORAL EVERY 4 HOURS PRN
Status: CANCELLED | OUTPATIENT
Start: 2019-07-08

## 2019-07-08 RX ORDER — ALBUTEROL SULFATE 2.5 MG/3ML
2.5 SOLUTION RESPIRATORY (INHALATION)
Status: CANCELLED | OUTPATIENT
Start: 2019-07-08

## 2019-07-08 RX ORDER — AMLODIPINE BESYLATE 2.5 MG/1
2.5 TABLET ORAL DAILY
Status: DISCONTINUED | OUTPATIENT
Start: 2019-07-09 | End: 2019-07-23 | Stop reason: HOSPADM

## 2019-07-08 RX ORDER — IPRATROPIUM BROMIDE AND ALBUTEROL SULFATE 2.5; .5 MG/3ML; MG/3ML
1 SOLUTION RESPIRATORY (INHALATION) 4 TIMES DAILY
Status: DISCONTINUED | OUTPATIENT
Start: 2019-07-08 | End: 2019-07-23 | Stop reason: HOSPADM

## 2019-07-08 RX ORDER — M-VIT,TX,IRON,MINS/CALC/FOLIC 27MG-0.4MG
1 TABLET ORAL DAILY
Status: CANCELLED | OUTPATIENT
Start: 2019-07-09

## 2019-07-08 RX ORDER — IPRATROPIUM BROMIDE AND ALBUTEROL SULFATE 2.5; .5 MG/3ML; MG/3ML
1 SOLUTION RESPIRATORY (INHALATION) 4 TIMES DAILY
Status: CANCELLED | OUTPATIENT
Start: 2019-07-08

## 2019-07-08 RX ORDER — FLUTICASONE PROPIONATE 50 MCG
2 SPRAY, SUSPENSION (ML) NASAL DAILY
Status: CANCELLED | OUTPATIENT
Start: 2019-07-08

## 2019-07-08 RX ORDER — PANTOPRAZOLE SODIUM 40 MG/1
40 TABLET, DELAYED RELEASE ORAL DAILY
Status: DISCONTINUED | OUTPATIENT
Start: 2019-07-09 | End: 2019-07-23 | Stop reason: HOSPADM

## 2019-07-08 RX ORDER — FOLIC ACID 1 MG/1
1 TABLET ORAL DAILY
Status: CANCELLED | OUTPATIENT
Start: 2019-07-09

## 2019-07-08 RX ORDER — PREDNISONE 10 MG/1
40 TABLET ORAL DAILY
Status: CANCELLED | OUTPATIENT
Start: 2019-07-09

## 2019-07-08 RX ORDER — PREDNISONE 20 MG/1
40 TABLET ORAL DAILY
Status: DISCONTINUED | OUTPATIENT
Start: 2019-07-09 | End: 2019-07-10

## 2019-07-08 RX ORDER — POLYETHYLENE GLYCOL 3350 17 G/17G
17 POWDER, FOR SOLUTION ORAL DAILY
Status: DISCONTINUED | OUTPATIENT
Start: 2019-07-09 | End: 2019-07-23 | Stop reason: HOSPADM

## 2019-07-08 RX ORDER — ALBUTEROL SULFATE 2.5 MG/3ML
2.5 SOLUTION RESPIRATORY (INHALATION)
Status: DISCONTINUED | OUTPATIENT
Start: 2019-07-08 | End: 2019-07-23 | Stop reason: HOSPADM

## 2019-07-08 RX ORDER — AMLODIPINE BESYLATE 5 MG/1
5 TABLET ORAL DAILY
Status: CANCELLED | OUTPATIENT
Start: 2019-07-09

## 2019-07-08 RX ORDER — SERTRALINE HYDROCHLORIDE 25 MG/1
25 TABLET, FILM COATED ORAL DAILY
Status: CANCELLED | OUTPATIENT
Start: 2019-07-09

## 2019-07-08 RX ORDER — HYDROCODONE BITARTRATE AND ACETAMINOPHEN 5; 325 MG/1; MG/1
1 TABLET ORAL EVERY 6 HOURS PRN
Status: CANCELLED | OUTPATIENT
Start: 2019-07-08

## 2019-07-08 RX ADMIN — IPRATROPIUM BROMIDE AND ALBUTEROL SULFATE 1 AMPULE: 2.5; .5 SOLUTION RESPIRATORY (INHALATION) at 07:43

## 2019-07-08 RX ADMIN — BUDESONIDE 500 MCG: 0.5 INHALANT RESPIRATORY (INHALATION) at 20:01

## 2019-07-08 RX ADMIN — FOLIC ACID 1 MG: 1 TABLET ORAL at 09:10

## 2019-07-08 RX ADMIN — PANTOPRAZOLE SODIUM 40 MG: 40 TABLET, DELAYED RELEASE ORAL at 05:07

## 2019-07-08 RX ADMIN — MULTIPLE VITAMINS W/ MINERALS TAB 1 TABLET: TAB at 09:10

## 2019-07-08 RX ADMIN — GUAIFENESIN 600 MG: 600 TABLET, EXTENDED RELEASE ORAL at 09:10

## 2019-07-08 RX ADMIN — HYDROCODONE BITARTRATE AND ACETAMINOPHEN 1 TABLET: 5; 325 TABLET ORAL at 09:10

## 2019-07-08 RX ADMIN — IPRATROPIUM BROMIDE AND ALBUTEROL SULFATE 1 AMPULE: .5; 3 SOLUTION RESPIRATORY (INHALATION) at 20:01

## 2019-07-08 RX ADMIN — PREDNISONE 40 MG: 10 TABLET ORAL at 09:10

## 2019-07-08 RX ADMIN — AMLODIPINE BESYLATE 5 MG: 5 TABLET ORAL at 09:10

## 2019-07-08 RX ADMIN — BUDESONIDE 500 MCG: 0.5 INHALANT RESPIRATORY (INHALATION) at 07:42

## 2019-07-08 RX ADMIN — Medication 10 ML: at 09:21

## 2019-07-08 RX ADMIN — SERTRALINE HYDROCHLORIDE 25 MG: 25 TABLET ORAL at 09:10

## 2019-07-08 RX ADMIN — FLUTICASONE PROPIONATE 2 SPRAY: 50 SPRAY, METERED NASAL at 14:15

## 2019-07-08 RX ADMIN — ASPIRIN 81 MG 81 MG: 81 TABLET ORAL at 09:10

## 2019-07-08 RX ADMIN — IPRATROPIUM BROMIDE AND ALBUTEROL SULFATE 1 AMPULE: 2.5; .5 SOLUTION RESPIRATORY (INHALATION) at 11:30

## 2019-07-08 RX ADMIN — IPRATROPIUM BROMIDE AND ALBUTEROL SULFATE 1 AMPULE: 2.5; .5 SOLUTION RESPIRATORY (INHALATION) at 15:19

## 2019-07-08 RX ADMIN — ENOXAPARIN SODIUM 40 MG: 40 INJECTION SUBCUTANEOUS at 21:26

## 2019-07-08 RX ADMIN — HYDROCODONE BITARTRATE AND ACETAMINOPHEN 1 TABLET: 5; 325 TABLET ORAL at 02:45

## 2019-07-08 RX ADMIN — HYDROCODONE BITARTRATE AND ACETAMINOPHEN 1 TABLET: 5; 325 TABLET ORAL at 15:49

## 2019-07-08 RX ADMIN — GUAIFENESIN 600 MG: 600 TABLET, EXTENDED RELEASE ORAL at 21:27

## 2019-07-08 RX ADMIN — POLYETHYLENE GLYCOL 3350 17 G: 17 POWDER, FOR SOLUTION ORAL at 09:09

## 2019-07-08 RX ADMIN — HYDROCODONE BITARTRATE AND ACETAMINOPHEN 1 TABLET: 5; 325 TABLET ORAL at 23:02

## 2019-07-08 ASSESSMENT — PAIN SCALES - GENERAL
PAINLEVEL_OUTOF10: 6
PAINLEVEL_OUTOF10: 6
PAINLEVEL_OUTOF10: 0
PAINLEVEL_OUTOF10: 0
PAINLEVEL_OUTOF10: 6
PAINLEVEL_OUTOF10: 6

## 2019-07-08 ASSESSMENT — PAIN DESCRIPTION - DESCRIPTORS: DESCRIPTORS: ACHING

## 2019-07-08 ASSESSMENT — PAIN DESCRIPTION - LOCATION: LOCATION: CHEST

## 2019-07-08 ASSESSMENT — PAIN DESCRIPTION - PAIN TYPE: TYPE: ACUTE PAIN

## 2019-07-08 ASSESSMENT — PAIN DESCRIPTION - ORIENTATION: ORIENTATION: MID

## 2019-07-08 NOTE — PROGRESS NOTES
ASSESSMENT:   Body structures, Functions, Activity limitations: Decreased functional mobility ; Decreased strength;Decreased endurance;Decreased balance  Decision Making: Medium Complexity  History: high  Exam: medium  Clinical Presentation: medium    Prognosis: Good  Patient Education: PT POC, DC planning, safety awareness    DISCHARGE RECOMMENDATIONS:  Discharge Recommendations: Continue to assess pending progress, Patient would benefit from continued therapy after discharge    Assessment: Pt with severely reduced endurance, difficulty ambulating household distances, limited to about 20 ft and <2 min dynamic activity before requiring rest break. Pt will benefit from continued PT to address impairments and return to PLOF.     REQUIRES PT FOLLOW UP: Yes      PLAN OF CARE:  Plan  Times per week: 3-6  Current Treatment Recommendations: Strengthening, Functional Mobility Training, Neuromuscular Re-education, Home Exercise Program, Equipment Evaluation, Education, & procurement, Transfer Training, Gait Training, Safety Education & Training, Balance Training, Endurance Training, Stair training, Patient/Caregiver Education & Training  Safety Devices  Type of devices: Call light within reach, Bed alarm in place    Goals:  Patient goals : \"to get better\"  Short term goals  Short term goal 1: pt to tolerate >5 min standing dynamic activity  Short term goal 2: >10 inches FR indicating low falls risk  Long term goals  Long term goal 1: pt to be indep with transfers  Long term goal 2: ambulate >50 ft indep no AD  Long term goal 3: pt to navigate 12 steps with supervision    [facility] (6 CLICK) [id number] Mobility Raw Score : 17     Therapy Time:   Individual   Time In 1315   Time Out 1330   Minutes 15           Jon Bumpers, [address] [facility], 07/08/19 at 1:45 PM

## 2019-07-08 NOTE — DISCHARGE INSTR - COC
ipratropium-albuterol (DUONEB) nebulizer solution 1 ampule  :  Dose 1 ampule  :  Inhalation  :  4 TIMES DAILY  budesonide (PULMICORT) nebulizer suspension 500 mcg  :  Ordered Dose 0.5 mg  :  Admin Dose 500 mcg  :  Nebulization  :  2 TIMES DAILY  albuterol (PROVENTIL) nebulizer solution 2.5 mg  :  Dose 2.5 mg  :  Nebulization  :  EVERY 2 HOURS PRN  :  Wheezing    Oxygen Therapy:  is on oxygen at 4 L/min per nasal cannula. Ventilator:    - No ventilator support    Rehab Therapies: Physical Therapy and Occupational Therapy  Weight Bearing Status/Restrictions: No weight bearing restirctions  Other Medical Equipment (for information only, NOT a DME order):  wheelchair  Other Treatments: ***    Patient's personal belongings (please select all that are sent with patient):  Glasses, Dentures upper and lower    RN SIGNATURE:  Electronically signed by Diana Nixon RN on 7/8/19 at 4:25 PM    CASE MANAGEMENT/SOCIAL WORK SECTION    Inpatient Status Date: ***    Readmission Risk Assessment Score:  Readmission Risk              Risk of Unplanned Readmission:        29           Discharging to Facility/ Agency   · Name: [facility]  · Address:  · Phone:[phone]  · Fax:    Dialysis Facility (if applicable)   · Name:  · Address:  · Dialysis Schedule:  · Phone:  · Fax:    / signature: Electronically signed by ZAN Barraza on 7/8/19 at 3:50 PM    PHYSICIAN SECTION    Prognosis: Good    Condition at Discharge: Stable    Rehab Potential (if transferring to Rehab): Good    Recommended Labs or Other Treatments After Discharge: none     Physician Certification: I certify the above information and transfer of Romeo Vasquez  is necessary for the continuing treatment of the diagnosis listed and that she requires Acute Rehab for less 30 days.      Update Admission H&P: No change in H&P    PHYSICIAN SIGNATURE:  Electronically signed by Yuliana Santacruz DO on 7/8/19 at 12:02 PM

## 2019-07-08 NOTE — CARE COORDINATION
Patient set to be discharged home today and she just found out that her  is on his way here in an ambulance. She states he was not answering the phone so she sent his father to check on him and he was unable to walk. She states she has anxiety so she cant be left alone. She would like to go to rehab or a SNF if possible. We will need PT/OT to assess. RN asking for order and we will page. Electronically signed by Emely Harding RN on 7/8/19 at 12:07 PM    Spoke with Paresh Dewey and made her aware of rehab referral.  She will have Dr Deborah Linn review.   Electronically signed by Emely Harding RN on 7/8/19 at 2:33 PM

## 2019-07-08 NOTE — PROGRESS NOTES
Intravenous contrast medium:  Isovue-370, 100 mL Comparison:  CT chest, high-resolution, April 26, 2019, CTA chest, April 19, 2019, chest radiograph, May 1, 2018, PET/CT, February 1, 2019. Findings: Right lung shows diffuse emphysematous change. Pleural-based, noncalcified, spiculated, 1.8 x 3.9 cm consolidation superior segment, right lower lobe (series 2, image 21). This finding is unchanged from April, 2019. Dependent subsegmental atelectatic change, extends from this finding inferiorly. In addition, alveolar and reticular opacities are visualized coursing through the right middle, and right lower lobe. Left lung shows diffuse emphysematous change. Dependent subsegmental atelectatic change is found posteriorly in the left lower lobe, with reticular change identified at the left lung base. No hilar, mediastinal, or axillary lymph node enlargement. Thoracic aorta normal in course and caliber. Cardiac size normal. No pericardiac effusion. Caudal esophagus patulous, hiatal hernia identified. Adrenal glands without anomaly. Again visualized are multiple rounded areas of low attenuation, within the right and left hepatic lobes, the largest again measuring 1.5 cm found anteriorly within the lateral segment of the left hepatic lobe, most consistent with a cyst. Several subcentimeter areas are again identified, unchanged, and too small to further characterize. Benign-appearing hemangiomas are found from T5 to T12. No osteoblastic, and no osteolytic lesions are visualized. Extensive bilateral emphysematous change with bilateral extensive interstitial lung changes discussed. The largest area of masslike scarring superimposed upon emphysematous change is found within the superior segment of the right lower lobe, just cephalad to the area of biopsy, and January 2019. However, the possibility of residual or recurrent malignancy cannot be entirely excluded. No new lung masses/consolidation/nodules are identified.  Other

## 2019-07-08 NOTE — DISCHARGE SUMMARY
significant within the lung bases where superimposed infiltrate is not excluded. Discharge Medications:       Pb Farrell   Home Medication Instructions HOE:[id number]    Printed on:07/08/19 1104   Medication Information                      albuterol (PROVENTIL) (2.5 MG/3ML) 0.083% nebulizer solution  Take 3 mLs by nebulization every 6 hours as needed for Wheezing             amLODIPine (NORVASC) 5 MG tablet  Take 1 tablet by mouth daily             aspirin 81 MG chewable tablet  Take 1 tablet by mouth daily             budesonide-formoterol (SYMBICORT) 160-4.5 MCG/ACT AERO  Inhale 2 puffs into the lungs 2 times daily             fluticasone (FLONASE) 50 MCG/ACT nasal spray  2 sprays by Each Nostril route daily             folic acid (FOLVITE) 1 MG tablet  Take 1 tablet by mouth daily             guaiFENesin (MUCINEX) 600 MG extended release tablet  Take 1 tablet by mouth 2 times daily             HYDROcodone-acetaminophen (NORCO) 5-325 MG per tablet  Take 1 tablet by mouth every 6 hours as needed for Pain (or sob) for up to 30 days.              ipratropium-albuterol (DUONEB) 0.5-2.5 (3) MG/3ML SOLN nebulizer solution  Inhale 1 vial into the lungs every 6 hours as needed for Shortness of Breath             magnesium hydroxide (MILK OF MAGNESIA) 400 MG/5ML suspension  Take 30 mLs by mouth daily as needed for Constipation             Multiple Vitamins-Minerals (CENTRUM SILVER 50+WOMEN) TABS  Take 1 tablet by mouth daily             OXYGEN  Inhale 4 L into the lungs daily             pantoprazole (PROTONIX) 40 MG tablet  Take 1 tablet by mouth daily             polyethylene glycol (GLYCOLAX) powder  Take 17 g by mouth daily             predniSONE (DELTASONE) 10 MG tablet  Take 4 tablet daily X 4 days then 3 tablets daily x 4 days then 2 tablets daily x 4 days then 1 tablet daily x 4 days then stop             SENNA PO  Take 2 tablets by mouth nightly              sertraline (ZOLOFT) 25 MG tablet  Take 1

## 2019-07-08 NOTE — CARE COORDINATION
Per Connie Gutierrez in [place], Acute Rehab not indicated for pt. LSW and C3 met with pt and daughter to notify. Pt would now like DC to [facility]. Referral made. Per daughter, she has portable 02 for pt and will transport. [facility] can accept pt this day.

## 2019-07-09 PROCEDURE — 97530 THERAPEUTIC ACTIVITIES: CPT

## 2019-07-09 PROCEDURE — 6370000000 HC RX 637 (ALT 250 FOR IP): Performed by: INTERNAL MEDICINE

## 2019-07-09 PROCEDURE — 2700000000 HC OXYGEN THERAPY PER DAY

## 2019-07-09 PROCEDURE — 97166 OT EVAL MOD COMPLEX 45 MIN: CPT

## 2019-07-09 PROCEDURE — 1200000002 HC SEMI PRIVATE SWING BED

## 2019-07-09 PROCEDURE — 97162 PT EVAL MOD COMPLEX 30 MIN: CPT

## 2019-07-09 PROCEDURE — 6360000002 HC RX W HCPCS: Performed by: INTERNAL MEDICINE

## 2019-07-09 PROCEDURE — 94640 AIRWAY INHALATION TREATMENT: CPT

## 2019-07-09 PROCEDURE — 97535 SELF CARE MNGMENT TRAINING: CPT

## 2019-07-09 RX ADMIN — SERTRALINE HYDROCHLORIDE 25 MG: 50 TABLET ORAL at 08:59

## 2019-07-09 RX ADMIN — HYDROCODONE BITARTRATE AND ACETAMINOPHEN 1 TABLET: 5; 325 TABLET ORAL at 05:07

## 2019-07-09 RX ADMIN — BUDESONIDE 500 MCG: 0.5 INHALANT RESPIRATORY (INHALATION) at 06:15

## 2019-07-09 RX ADMIN — HYDROCODONE BITARTRATE AND ACETAMINOPHEN 1 TABLET: 5; 325 TABLET ORAL at 21:50

## 2019-07-09 RX ADMIN — GUAIFENESIN 600 MG: 600 TABLET, EXTENDED RELEASE ORAL at 08:59

## 2019-07-09 RX ADMIN — IPRATROPIUM BROMIDE AND ALBUTEROL SULFATE 1 AMPULE: .5; 3 SOLUTION RESPIRATORY (INHALATION) at 18:28

## 2019-07-09 RX ADMIN — GUAIFENESIN 600 MG: 600 TABLET, EXTENDED RELEASE ORAL at 21:48

## 2019-07-09 RX ADMIN — FOLIC ACID 1 MG: 1 TABLET ORAL at 09:00

## 2019-07-09 RX ADMIN — IPRATROPIUM BROMIDE AND ALBUTEROL SULFATE 1 AMPULE: .5; 3 SOLUTION RESPIRATORY (INHALATION) at 06:15

## 2019-07-09 RX ADMIN — IPRATROPIUM BROMIDE AND ALBUTEROL SULFATE 1 AMPULE: .5; 3 SOLUTION RESPIRATORY (INHALATION) at 09:50

## 2019-07-09 RX ADMIN — MULTIPLE VITAMINS W/ MINERALS TAB 1 TABLET: TAB at 09:00

## 2019-07-09 RX ADMIN — HYDROCODONE BITARTRATE AND ACETAMINOPHEN 1 TABLET: 5; 325 TABLET ORAL at 12:35

## 2019-07-09 RX ADMIN — BUDESONIDE 500 MCG: 0.5 INHALANT RESPIRATORY (INHALATION) at 18:29

## 2019-07-09 RX ADMIN — FLUTICASONE PROPIONATE 2 SPRAY: 50 SPRAY, METERED NASAL at 12:34

## 2019-07-09 RX ADMIN — PREDNISONE 40 MG: 20 TABLET ORAL at 08:59

## 2019-07-09 RX ADMIN — ASPIRIN 81 MG 81 MG: 81 TABLET ORAL at 08:59

## 2019-07-09 RX ADMIN — ENOXAPARIN SODIUM 40 MG: 40 INJECTION SUBCUTANEOUS at 21:48

## 2019-07-09 RX ADMIN — PANTOPRAZOLE SODIUM 40 MG: 40 TABLET, DELAYED RELEASE ORAL at 05:07

## 2019-07-09 RX ADMIN — IPRATROPIUM BROMIDE AND ALBUTEROL SULFATE 1 AMPULE: .5; 3 SOLUTION RESPIRATORY (INHALATION) at 13:56

## 2019-07-09 ASSESSMENT — PAIN DESCRIPTION - LOCATION
LOCATION: CHEST
LOCATION: CHEST

## 2019-07-09 ASSESSMENT — PAIN SCALES - GENERAL
PAINLEVEL_OUTOF10: 6
PAINLEVEL_OUTOF10: 6
PAINLEVEL_OUTOF10: 5
PAINLEVEL_OUTOF10: 2
PAINLEVEL_OUTOF10: 5
PAINLEVEL_OUTOF10: 6

## 2019-07-09 ASSESSMENT — PAIN DESCRIPTION - PAIN TYPE
TYPE: ACUTE PAIN
TYPE: ACUTE PAIN

## 2019-07-09 ASSESSMENT — PAIN DESCRIPTION - DESCRIPTORS: DESCRIPTORS: ACHING

## 2019-07-09 NOTE — PROGRESS NOTES
Physical Therapy  Facility/Department: [facility] MED SURG UNIT  Daily Treatment Note  NAME: Clive Mackay  : 1945  MRN: [id number]    Date of Service: 2019    Discharge Recommendations:  Home with assist PRN, Home with Home health PT        Assessment   Body structures, Functions, Activity limitations: Decreased functional mobility ; Decreased strength;Decreased endurance;Decreased balance(difficulty breathing)  Prognosis: Good  REQUIRES PT FOLLOW UP: Yes     Patient Diagnosis(es): There were no encounter diagnoses. has a past medical history of COPD (chronic obstructive pulmonary disease) ([facility] and Lung cancer ([facility] has a past surgical history that includes Hysterectomy; Appendectomy; Tonsillectomy; and bronchoscopy (N/A, 2019). Restrictions  Restrictions/Precautions  Restrictions/Precautions: Fall Risk  Subjective   General  Chart Reviewed: Yes  Referring Practitioner: Dr Miranda Kirby: Pt. out of facility at this time due to  in ICU and left on pass per nursing staff.            Orientation     Cognition      Objective                                           G-Code     OutComes Score                                                     AM-PAC Score             Goals  Short term goals  Time Frame for Short term goals: 1 week  Short term goal 1: Transfers and bed mobility with CS without cues for managing O2 tubing  Short term goal 2: amb >=40ft with pulse ox >90% post  Short term goal 3: tolerate x10 seated LE SHARON with rests and pulse ox >90%  Short term goal 4: asess 2 stairs with 2 rails and <= CGA of 1, check pulse ox post  Long term goals  Time Frame for Long term goals : 2 weeks  Long term goal 1: Independent bed mobility and transfers  Long term goal 2: amb >= 70ft with least AD with least AD maiantaing O2>90% and managing tubing independently  Long term goal 3: 2 stairs without rails with <= CGA of 1 person  Long term goal 4: increase LE stregntha nd endurance any to

## 2019-07-09 NOTE — PROGRESS NOTES
Pt out on pass with daughter Ashwini Helms to see significant other in ED [facility] ICU who is not expected to survive the day.

## 2019-07-09 NOTE — PROGRESS NOTES
3-6x/wk  Times per day: Daily  Plan weeks: 2 wks  Current Treatment Recommendations: Strengthening, ROM, Balance Training, Functional Mobility Training, Endurance Training, Stair training, Pain Management, Safety Education & Training, Patient/Caregiver Education & Training, Self-Care / ADL, Home Management Training          Goals  Short term goals  Time Frame for Short term goals: 1 wk  Short term goal 1: Tolerate 25-30min BUE ther ex/act, while maintaining O2 sats 90% or above, to increase strength/end for ADL  Short term goal 2: Increase to SBA/Spv toileting  Short term goal 3: SBA/Spv LB dressing  Short term goal 4: Tolerate 2-4hr seated in chair/OOB time for increased act valerie/end  Long term goals  Time Frame for Long term goals : 2 wks  Long term goal 1: I/MI fxl ADL xfers  Long term goal 2: Increase to 5-7min stand valerie/end, while maintaining O2 sats 90% or above, to increase I with ADL  Long term goal 3: I/MI toileting  Long term goal 4: I/MI LB dressing and bathing  Long term goal 5: I BUE HEP  Patient Goals   Patient goals :  To return home when better       Therapy Time   Individual Concurrent Group Co-treatment   Time In  10:00         Time Out  11:10         Minutes  [address], [place]

## 2019-07-09 NOTE — PROGRESS NOTES
Good;-(ww)  Comments: pt needing cues to be aware of O2 tubing        Assessment   Body structures, Functions, Activity limitations: Decreased functional mobility ; Decreased strength;Decreased endurance;Decreased balance(difficulty breathing)  Assessment: 74yof with difficulty breathing, on 4L of O2 via nc, fatigues quickly, does not own ww or 3 in 1` commode, likely will be too dificult to do flight of stairs in current respiratory state, will need AD and 3 in 1 commode at discharge as no bathroom on first floor, recommend bed be put on first floor due to respiratory condition. Recommend 2 weeks subacute folowed by Devora Tarango and SD assist of family at discharge. Recommend entrance stairs be fixed and railing installed fro safe home access.   Prognosis: Good  Decision Making: Medium Complexity  Patient Education: educated tx plan and goals,   REQUIRES PT FOLLOW UP: Yes  Activity Tolerance  Activity Tolerance: Patient limited by endurance     Discharge Recommendations:  Home with assist PRN, Home with Home health PT      Plan   Plan  Times per week: 5-7x week  Times per day: (1-2x per day)  Plan weeks: 2  Current Treatment Recommendations: Strengthening, Gait Training, Stair training, Balance Training, Endurance Training, Functional Mobility Training, Transfer Training, Safety Education & Training, Home Exercise Program, Patient/Caregiver Education & Training  Safety Devices  Type of devices: Call light within reach, All fall risk precautions in place, Chair alarm in place, Gait belt    G-Code          Goals  Short term goals  Time Frame for Short term goals: 1 week  Short term goal 1: Transfers and bed mobility with CS without cues for managing O2 tubing  Short term goal 2: amb >=40ft with pulse ox >90% post  Short term goal 3: tolerate x10 seated LE SHARON with rests and pulse ox >90%  Short term goal 4: asess 2 stairs with 2 rails and <= CGA of 1, check pulse ox post  Long term goals  Time Frame for Long term goals : 2

## 2019-07-10 PROCEDURE — 6370000000 HC RX 637 (ALT 250 FOR IP): Performed by: INTERNAL MEDICINE

## 2019-07-10 PROCEDURE — 97535 SELF CARE MNGMENT TRAINING: CPT

## 2019-07-10 PROCEDURE — 6360000002 HC RX W HCPCS: Performed by: INTERNAL MEDICINE

## 2019-07-10 PROCEDURE — 97112 NEUROMUSCULAR REEDUCATION: CPT

## 2019-07-10 PROCEDURE — 97116 GAIT TRAINING THERAPY: CPT

## 2019-07-10 PROCEDURE — 97530 THERAPEUTIC ACTIVITIES: CPT

## 2019-07-10 PROCEDURE — 1200000002 HC SEMI PRIVATE SWING BED

## 2019-07-10 PROCEDURE — 2700000000 HC OXYGEN THERAPY PER DAY

## 2019-07-10 PROCEDURE — 94640 AIRWAY INHALATION TREATMENT: CPT

## 2019-07-10 RX ADMIN — GUAIFENESIN 600 MG: 600 TABLET, EXTENDED RELEASE ORAL at 08:55

## 2019-07-10 RX ADMIN — MULTIPLE VITAMINS W/ MINERALS TAB 1 TABLET: TAB at 08:55

## 2019-07-10 RX ADMIN — PANTOPRAZOLE SODIUM 40 MG: 40 TABLET, DELAYED RELEASE ORAL at 05:38

## 2019-07-10 RX ADMIN — IPRATROPIUM BROMIDE AND ALBUTEROL SULFATE 1 AMPULE: .5; 3 SOLUTION RESPIRATORY (INHALATION) at 18:17

## 2019-07-10 RX ADMIN — AMLODIPINE BESYLATE 2.5 MG: 2.5 TABLET ORAL at 08:55

## 2019-07-10 RX ADMIN — FOLIC ACID 1 MG: 1 TABLET ORAL at 08:55

## 2019-07-10 RX ADMIN — BUDESONIDE 500 MCG: 0.5 INHALANT RESPIRATORY (INHALATION) at 05:53

## 2019-07-10 RX ADMIN — HYDROCODONE BITARTRATE AND ACETAMINOPHEN 1 TABLET: 5; 325 TABLET ORAL at 16:28

## 2019-07-10 RX ADMIN — SERTRALINE HYDROCHLORIDE 25 MG: 50 TABLET ORAL at 08:55

## 2019-07-10 RX ADMIN — IPRATROPIUM BROMIDE AND ALBUTEROL SULFATE 1 AMPULE: .5; 3 SOLUTION RESPIRATORY (INHALATION) at 10:04

## 2019-07-10 RX ADMIN — HYDROCODONE BITARTRATE AND ACETAMINOPHEN 1 TABLET: 5; 325 TABLET ORAL at 22:17

## 2019-07-10 RX ADMIN — HYDROCODONE BITARTRATE AND ACETAMINOPHEN 1 TABLET: 5; 325 TABLET ORAL at 10:02

## 2019-07-10 RX ADMIN — BUDESONIDE 500 MCG: 0.5 INHALANT RESPIRATORY (INHALATION) at 18:18

## 2019-07-10 RX ADMIN — IPRATROPIUM BROMIDE AND ALBUTEROL SULFATE 1 AMPULE: .5; 3 SOLUTION RESPIRATORY (INHALATION) at 05:53

## 2019-07-10 RX ADMIN — ASPIRIN 81 MG 81 MG: 81 TABLET ORAL at 08:55

## 2019-07-10 RX ADMIN — GUAIFENESIN 600 MG: 600 TABLET, EXTENDED RELEASE ORAL at 21:19

## 2019-07-10 RX ADMIN — HYDROCODONE BITARTRATE AND ACETAMINOPHEN 1 TABLET: 5; 325 TABLET ORAL at 03:58

## 2019-07-10 RX ADMIN — PREDNISONE 40 MG: 20 TABLET ORAL at 08:55

## 2019-07-10 RX ADMIN — POLYETHYLENE GLYCOL 3350 17 G: 17 POWDER, FOR SOLUTION ORAL at 08:55

## 2019-07-10 ASSESSMENT — PAIN - FUNCTIONAL ASSESSMENT
PAIN_FUNCTIONAL_ASSESSMENT: ACTIVITIES ARE NOT PREVENTED

## 2019-07-10 ASSESSMENT — PAIN DESCRIPTION - ONSET
ONSET: ON-GOING

## 2019-07-10 ASSESSMENT — PAIN DESCRIPTION - LOCATION
LOCATION: CHEST
LOCATION: BACK;CHEST

## 2019-07-10 ASSESSMENT — PAIN SCALES - GENERAL
PAINLEVEL_OUTOF10: 6
PAINLEVEL_OUTOF10: 5
PAINLEVEL_OUTOF10: 6
PAINLEVEL_OUTOF10: 5
PAINLEVEL_OUTOF10: 5
PAINLEVEL_OUTOF10: 4
PAINLEVEL_OUTOF10: 5

## 2019-07-10 ASSESSMENT — PAIN DESCRIPTION - PAIN TYPE
TYPE: CHRONIC PAIN

## 2019-07-10 ASSESSMENT — PAIN DESCRIPTION - PROGRESSION
CLINICAL_PROGRESSION: GRADUALLY WORSENING
CLINICAL_PROGRESSION: NOT CHANGED
CLINICAL_PROGRESSION: GRADUALLY IMPROVING

## 2019-07-10 ASSESSMENT — PAIN DESCRIPTION - DESCRIPTORS
DESCRIPTORS: ACHING;SORE

## 2019-07-10 ASSESSMENT — PAIN DESCRIPTION - FREQUENCY
FREQUENCY: INTERMITTENT

## 2019-07-10 NOTE — PROGRESS NOTES
rehabilitation services? : (in subacute as of 7/8/19)  Additional Pertinent Hx: Was diagnosed with lung cancer at the beginning of this year  Family / Caregiver Present: No  Referring Practitioner: Dr. Neetu Huerta   Diagnosis: COPD exacerbation, pneumonia  Subjective  Subjective: Pt agreeable to shower this date. Pain Assessment  Response to Pain Intervention: Drowsy  Vital Signs  BP Location: Right upper arm  Level of Consciousness: Alert  Patient Currently in Pain: Yes  Oxygen Therapy  O2 Device: Nasal cannula     Objective    ADL  Grooming: Setup/Supervision(seated)  UE Bathing: Stand by assistance; Increased time to complete (use of shower chair)  LE Bathing: Stand by assistance; Increased time to complete (use of shower chair)  UE Dressing: Stand by assistance (cues for not tangling O2 tubing in shirt)  LE Dressing: Minimal assistance; Increased time to complete  Toileting: Contact guard assistance  Additional Comments: becomes SOB during ADL, requires rest breaks and cues for pursed lip breathing         Functional Mobility  Functional - Mobility Device: Rolling Walker  Activity: To/from restroom   Assist Level: Contact guard assistance(max cues/assist for managing O2 tubing and not getting tangled in it)  Functional Mobility Comments: Post shower after coming back from restroom, pt's O2 sats desatted to 73%, recovery time ~5min to get to 90% on 4L O2. Pt guided through pursed lip breathing for O2 recovery. Toilet Transfers  Toilet - Technique: Ambulating  Equipment Used: Grab bars  Toilet Transfer: Contact guard assistance(completed 2x this session)  Toilet Transfers Comments: Significant SOB, needs rest breaks for O2 recovery. Pt also needs maxA for managing O2 tubing at this time and frequently becomes entangled in it. Shower Transfers  Shower - Transfer From: Angela Martins - Transfer Type: To and From  Shower - Transfer To:  Shower seat with back  Shower - Technique: Sit pivot(use of grab bars)  Shower Transfers: Contact Guard  Shower Transfers Comments: cues for safety/sequencing  Bed mobility  Supine to Sit: Supervision  Sit to Supine: Supervision  Scooting: Supervision  Transfers  Stand Step Transfers: Contact guard assistance(with RW)  Sit to stand: Contact guard assistance  Stand to sit: Contact guard assistance  Transfer Comments: cues for safety with arm placement and assist to manage O2 tubing           Plan   Plan  Times per week: 3-6x/wk  Times per day: Daily  Plan weeks: 2 wks  Current Treatment Recommendations: Strengthening, ROM, Balance Training, Functional Mobility Training, Endurance Training, Stair training, Pain Management, Safety Education & Training, Patient/Caregiver Education & Training, Self-Care / ADL, Home Management Training      Goals  Short term goals  Time Frame for Short term goals: 1 wk  Short term goal 1: Tolerate 25-30min BUE ther ex/act, while maintaining O2 sats 90% or above, to increase strength/end for ADL  Short term goal 2: Increase to SBA/Spv toileting  Short term goal 3: SBA/Spv LB dressing  Short term goal 4: Tolerate 2-4hr seated in chair/OOB time for increased act valerie/end  Long term goals  Time Frame for Long term goals : 2 wks  Long term goal 1: I/MI fxl ADL xfers  Long term goal 2: Increase to 5-7min stand valerie/end, while maintaining O2 sats 90% or above, to increase I with ADL  Long term goal 3: I/MI toileting  Long term goal 4: I/MI LB dressing and bathing  Long term goal 5: I BUE HEP  Patient Goals   Patient goals :  To return home when better       Therapy Time   Individual Concurrent Group Co-treatment   Time In  8:05         Time Out  9:05         Minutes  [address], [place]

## 2019-07-10 NOTE — PROGRESS NOTES
Wt: 103 lb (46.7 kg)(3/19 chart. 99# 1/19 chart)  · % Weight Change:  ,  3% over 4 months  · Ideal Body Wt: 110 lb (49.9 kg), % Ideal Body 91%  · Adjusted Body Wt:  , body weight adjusted for    · BMI Classification: BMI <18.5 Underweight    Nutrition Interventions:   Continue current diet, Continue current ONS  Continued Inpatient Monitoring, Education Not Indicated    Nutrition Evaluation:   · Evaluation: Goals set   · Goals: Intake > 75% meals & ONS. Slow weight gain to UBW range.     · Monitoring: Meal Intake, Supplement Intake, Weight, Pertinent Labs, Monitor Bowel Function      Electronically signed by Marleni Lang RD, LD on 7/10/19 at 4:25 PM

## 2019-07-10 NOTE — PROGRESS NOTES
oncologist and primary care doctor. I may or may not have addressed all of this pt symptoms, medical issues, abnormal labs and findings. Pt will need additional work up, investigation, testing, surveillance, and treatment to be done at a later time and date during this hospitalization or post discharge by PCP and other out pt providers. Portion of patient care is managed by other providers. Please refer to their notes for details. I will follow specialists recommendations given their expertise in specialty subject matters. I will transfer patient to a tertiary care center if recommended by specialists. Further workup and plan will be determined based on the clinical progression and a follow-up tests result. Night and next week  hospitalist will take care of the patient in my absence.

## 2019-07-10 NOTE — PROGRESS NOTES
Chart reviewed. Multidisciplinary team met with patient in care conference. Discussed patient's care and DC planning. Patient reports that goal is to get stronger and be able to return home. It is worth noting that patient reports that significant other of over 30 years is rapidly declining at [facility] and patient is preparing for end of life issues. SS offered support through sympathy, validation, and reviewing extra services available at [facility]. Patient reports that family is supportive and no interest in meeting with psychologist at [facility] at this time. SS to follow and monitor patient's progress to assist with DC planning.

## 2019-07-10 NOTE — PROGRESS NOTES
Patient/Caregiver Education & Training  Safety Devices  Type of devices: Call light within reach, All fall risk precautions in place, Chair alarm in place, Gait belt     Therapy Time   Individual Concurrent Group Co-treatment   Time In  1110         Time Out  1200         Minutes  [address] [facility], [facility] and [place] [address] Number: [id number]

## 2019-07-11 PROBLEM — R53.81 MULTIFACTORIAL FUNCTIONAL IMPAIRMENT: Status: ACTIVE | Noted: 2019-07-11

## 2019-07-11 LAB
ANION GAP SERPL CALCULATED.3IONS-SCNC: 12 MEQ/L (ref 9–15)
BASOPHILS ABSOLUTE: 0 K/UL (ref 0–0.2)
BASOPHILS RELATIVE PERCENT: 0 %
BUN BLDV-MCNC: 20 MG/DL (ref 8–23)
CALCIUM SERPL-MCNC: 9.4 MG/DL (ref 8.5–9.9)
CHLORIDE BLD-SCNC: 102 MEQ/L (ref 95–107)
CO2: 25 MEQ/L (ref 20–31)
CREAT SERPL-MCNC: 0.42 MG/DL (ref 0.5–0.9)
EOSINOPHILS ABSOLUTE: 0.1 K/UL (ref 0–0.7)
EOSINOPHILS RELATIVE PERCENT: 0.6 %
GFR AFRICAN AMERICAN: >60
GFR NON-AFRICAN AMERICAN: >60
GLUCOSE BLD-MCNC: 99 MG/DL (ref 70–99)
HCT VFR BLD CALC: 38.1 % (ref 37–47)
HEMOGLOBIN: 12.5 G/DL (ref 12–16)
LYMPHOCYTES ABSOLUTE: 0.6 K/UL (ref 1–4.8)
LYMPHOCYTES RELATIVE PERCENT: 5.5 %
MCH RBC QN AUTO: 32.5 PG (ref 27–31.3)
MCHC RBC AUTO-ENTMCNC: 32.9 % (ref 33–37)
MCV RBC AUTO: 98.6 FL (ref 82–100)
MONOCYTES ABSOLUTE: 0.6 K/UL (ref 0.2–0.8)
MONOCYTES RELATIVE PERCENT: 6.1 %
NEUTROPHILS ABSOLUTE: 9.3 K/UL (ref 1.4–6.5)
NEUTROPHILS RELATIVE PERCENT: 87.8 %
PDW BLD-RTO: 13.2 % (ref 11.5–14.5)
PLATELET # BLD: 253 K/UL (ref 130–400)
POTASSIUM SERPL-SCNC: 3.8 MEQ/L (ref 3.4–4.9)
RBC # BLD: 3.86 M/UL (ref 4.2–5.4)
SODIUM BLD-SCNC: 139 MEQ/L (ref 135–144)
WBC # BLD: 10.6 K/UL (ref 4.8–10.8)

## 2019-07-11 PROCEDURE — 6360000002 HC RX W HCPCS: Performed by: INTERNAL MEDICINE

## 2019-07-11 PROCEDURE — 94640 AIRWAY INHALATION TREATMENT: CPT

## 2019-07-11 PROCEDURE — 1200000002 HC SEMI PRIVATE SWING BED

## 2019-07-11 PROCEDURE — 2700000000 HC OXYGEN THERAPY PER DAY

## 2019-07-11 PROCEDURE — 94669 MECHANICAL CHEST WALL OSCILL: CPT

## 2019-07-11 PROCEDURE — 80048 BASIC METABOLIC PNL TOTAL CA: CPT

## 2019-07-11 PROCEDURE — 6370000000 HC RX 637 (ALT 250 FOR IP): Performed by: INTERNAL MEDICINE

## 2019-07-11 PROCEDURE — 85025 COMPLETE CBC W/AUTO DIFF WBC: CPT

## 2019-07-11 PROCEDURE — 36415 COLL VENOUS BLD VENIPUNCTURE: CPT

## 2019-07-11 RX ORDER — ALPRAZOLAM 0.25 MG/1
0.25 TABLET ORAL 3 TIMES DAILY PRN
Status: DISCONTINUED | OUTPATIENT
Start: 2019-07-11 | End: 2019-07-23 | Stop reason: HOSPADM

## 2019-07-11 RX ORDER — ESCITALOPRAM OXALATE 10 MG/1
5 TABLET ORAL DAILY
Status: DISCONTINUED | OUTPATIENT
Start: 2019-07-11 | End: 2019-07-23 | Stop reason: HOSPADM

## 2019-07-11 RX ADMIN — HYDROCODONE BITARTRATE AND ACETAMINOPHEN 1 TABLET: 5; 325 TABLET ORAL at 20:14

## 2019-07-11 RX ADMIN — IPRATROPIUM BROMIDE AND ALBUTEROL SULFATE 1 AMPULE: .5; 3 SOLUTION RESPIRATORY (INHALATION) at 14:07

## 2019-07-11 RX ADMIN — IPRATROPIUM BROMIDE AND ALBUTEROL SULFATE 1 AMPULE: .5; 3 SOLUTION RESPIRATORY (INHALATION) at 05:55

## 2019-07-11 RX ADMIN — PANTOPRAZOLE SODIUM 40 MG: 40 TABLET, DELAYED RELEASE ORAL at 06:36

## 2019-07-11 RX ADMIN — SERTRALINE HYDROCHLORIDE 25 MG: 50 TABLET ORAL at 08:38

## 2019-07-11 RX ADMIN — MULTIPLE VITAMINS W/ MINERALS TAB 1 TABLET: TAB at 08:38

## 2019-07-11 RX ADMIN — ESCITALOPRAM OXALATE 5 MG: 10 TABLET ORAL at 12:45

## 2019-07-11 RX ADMIN — BUDESONIDE 500 MCG: 0.5 INHALANT RESPIRATORY (INHALATION) at 05:56

## 2019-07-11 RX ADMIN — IPRATROPIUM BROMIDE AND ALBUTEROL SULFATE 1 AMPULE: .5; 3 SOLUTION RESPIRATORY (INHALATION) at 18:01

## 2019-07-11 RX ADMIN — PREDNISONE 30 MG: 10 TABLET ORAL at 08:38

## 2019-07-11 RX ADMIN — ENOXAPARIN SODIUM 40 MG: 40 INJECTION SUBCUTANEOUS at 20:14

## 2019-07-11 RX ADMIN — ASPIRIN 81 MG 81 MG: 81 TABLET ORAL at 08:38

## 2019-07-11 RX ADMIN — AMLODIPINE BESYLATE 2.5 MG: 2.5 TABLET ORAL at 08:38

## 2019-07-11 RX ADMIN — GUAIFENESIN 600 MG: 600 TABLET, EXTENDED RELEASE ORAL at 08:38

## 2019-07-11 RX ADMIN — POLYETHYLENE GLYCOL 3350 17 G: 17 POWDER, FOR SOLUTION ORAL at 08:39

## 2019-07-11 RX ADMIN — BUDESONIDE 500 MCG: 0.5 INHALANT RESPIRATORY (INHALATION) at 18:01

## 2019-07-11 RX ADMIN — ALPRAZOLAM 0.25 MG: 0.25 TABLET ORAL at 12:45

## 2019-07-11 RX ADMIN — GUAIFENESIN 600 MG: 600 TABLET, EXTENDED RELEASE ORAL at 20:15

## 2019-07-11 RX ADMIN — FOLIC ACID 1 MG: 1 TABLET ORAL at 08:38

## 2019-07-11 RX ADMIN — HYDROCODONE BITARTRATE AND ACETAMINOPHEN 1 TABLET: 5; 325 TABLET ORAL at 10:48

## 2019-07-11 RX ADMIN — IPRATROPIUM BROMIDE AND ALBUTEROL SULFATE 1 AMPULE: .5; 3 SOLUTION RESPIRATORY (INHALATION) at 10:21

## 2019-07-11 RX ADMIN — ALPRAZOLAM 0.25 MG: 0.25 TABLET ORAL at 20:15

## 2019-07-11 RX ADMIN — HYDROCODONE BITARTRATE AND ACETAMINOPHEN 1 TABLET: 5; 325 TABLET ORAL at 04:42

## 2019-07-11 ASSESSMENT — PAIN SCALES - GENERAL
PAINLEVEL_OUTOF10: 6
PAINLEVEL_OUTOF10: 0
PAINLEVEL_OUTOF10: 6
PAINLEVEL_OUTOF10: 0
PAINLEVEL_OUTOF10: 0
PAINLEVEL_OUTOF10: 5

## 2019-07-11 ASSESSMENT — PAIN - FUNCTIONAL ASSESSMENT: PAIN_FUNCTIONAL_ASSESSMENT: ACTIVITIES ARE NOT PREVENTED

## 2019-07-11 ASSESSMENT — PAIN DESCRIPTION - FREQUENCY: FREQUENCY: INTERMITTENT

## 2019-07-11 ASSESSMENT — PAIN DESCRIPTION - PROGRESSION: CLINICAL_PROGRESSION: GRADUALLY WORSENING

## 2019-07-11 ASSESSMENT — PAIN DESCRIPTION - LOCATION: LOCATION: CHEST;BACK

## 2019-07-11 ASSESSMENT — PAIN DESCRIPTION - PAIN TYPE: TYPE: CHRONIC PAIN

## 2019-07-11 ASSESSMENT — PAIN DESCRIPTION - ONSET: ONSET: ON-GOING

## 2019-07-11 ASSESSMENT — PAIN DESCRIPTION - DESCRIPTORS: DESCRIPTORS: ACHING;SORE

## 2019-07-12 PROCEDURE — 6370000000 HC RX 637 (ALT 250 FOR IP): Performed by: INTERNAL MEDICINE

## 2019-07-12 PROCEDURE — 94669 MECHANICAL CHEST WALL OSCILL: CPT

## 2019-07-12 PROCEDURE — 97530 THERAPEUTIC ACTIVITIES: CPT

## 2019-07-12 PROCEDURE — 94640 AIRWAY INHALATION TREATMENT: CPT

## 2019-07-12 PROCEDURE — 2700000000 HC OXYGEN THERAPY PER DAY

## 2019-07-12 PROCEDURE — 97535 SELF CARE MNGMENT TRAINING: CPT

## 2019-07-12 PROCEDURE — 97110 THERAPEUTIC EXERCISES: CPT

## 2019-07-12 PROCEDURE — 1200000002 HC SEMI PRIVATE SWING BED

## 2019-07-12 PROCEDURE — 97116 GAIT TRAINING THERAPY: CPT

## 2019-07-12 PROCEDURE — 6360000002 HC RX W HCPCS: Performed by: INTERNAL MEDICINE

## 2019-07-12 RX ADMIN — IPRATROPIUM BROMIDE AND ALBUTEROL SULFATE 1 AMPULE: .5; 3 SOLUTION RESPIRATORY (INHALATION) at 09:09

## 2019-07-12 RX ADMIN — HYDROCODONE BITARTRATE AND ACETAMINOPHEN 1 TABLET: 5; 325 TABLET ORAL at 11:09

## 2019-07-12 RX ADMIN — PREDNISONE 30 MG: 10 TABLET ORAL at 10:51

## 2019-07-12 RX ADMIN — ALPRAZOLAM 0.25 MG: 0.25 TABLET ORAL at 20:46

## 2019-07-12 RX ADMIN — FOLIC ACID 1 MG: 1 TABLET ORAL at 10:51

## 2019-07-12 RX ADMIN — IPRATROPIUM BROMIDE AND ALBUTEROL SULFATE 1 AMPULE: .5; 3 SOLUTION RESPIRATORY (INHALATION) at 18:05

## 2019-07-12 RX ADMIN — FLUTICASONE PROPIONATE 2 SPRAY: 50 SPRAY, METERED NASAL at 16:02

## 2019-07-12 RX ADMIN — BUDESONIDE 500 MCG: 0.5 INHALANT RESPIRATORY (INHALATION) at 18:05

## 2019-07-12 RX ADMIN — MULTIPLE VITAMINS W/ MINERALS TAB 1 TABLET: TAB at 10:51

## 2019-07-12 RX ADMIN — GUAIFENESIN 600 MG: 600 TABLET, EXTENDED RELEASE ORAL at 10:51

## 2019-07-12 RX ADMIN — SERTRALINE HYDROCHLORIDE 25 MG: 50 TABLET ORAL at 10:52

## 2019-07-12 RX ADMIN — ALPRAZOLAM 0.25 MG: 0.25 TABLET ORAL at 02:36

## 2019-07-12 RX ADMIN — HYDROCODONE BITARTRATE AND ACETAMINOPHEN 1 TABLET: 5; 325 TABLET ORAL at 02:36

## 2019-07-12 RX ADMIN — ESCITALOPRAM OXALATE 5 MG: 10 TABLET ORAL at 10:51

## 2019-07-12 RX ADMIN — ENOXAPARIN SODIUM 40 MG: 40 INJECTION SUBCUTANEOUS at 20:46

## 2019-07-12 RX ADMIN — GUAIFENESIN 600 MG: 600 TABLET, EXTENDED RELEASE ORAL at 20:46

## 2019-07-12 RX ADMIN — AMLODIPINE BESYLATE 2.5 MG: 2.5 TABLET ORAL at 10:53

## 2019-07-12 RX ADMIN — POLYETHYLENE GLYCOL 3350 17 G: 17 POWDER, FOR SOLUTION ORAL at 10:55

## 2019-07-12 RX ADMIN — HYDROCODONE BITARTRATE AND ACETAMINOPHEN 1 TABLET: 5; 325 TABLET ORAL at 20:50

## 2019-07-12 RX ADMIN — PANTOPRAZOLE SODIUM 40 MG: 40 TABLET, DELAYED RELEASE ORAL at 06:23

## 2019-07-12 RX ADMIN — IPRATROPIUM BROMIDE AND ALBUTEROL SULFATE 1 AMPULE: .5; 3 SOLUTION RESPIRATORY (INHALATION) at 13:04

## 2019-07-12 RX ADMIN — ASPIRIN 81 MG 81 MG: 81 TABLET ORAL at 10:54

## 2019-07-12 RX ADMIN — BUDESONIDE 500 MCG: 0.5 INHALANT RESPIRATORY (INHALATION) at 06:18

## 2019-07-12 RX ADMIN — IPRATROPIUM BROMIDE AND ALBUTEROL SULFATE 1 AMPULE: .5; 3 SOLUTION RESPIRATORY (INHALATION) at 06:18

## 2019-07-12 ASSESSMENT — PAIN DESCRIPTION - DESCRIPTORS: DESCRIPTORS: PRESSURE

## 2019-07-12 ASSESSMENT — PAIN SCALES - GENERAL
PAINLEVEL_OUTOF10: 0
PAINLEVEL_OUTOF10: 0
PAINLEVEL_OUTOF10: 5
PAINLEVEL_OUTOF10: 5
PAINLEVEL_OUTOF10: 6

## 2019-07-12 ASSESSMENT — PAIN DESCRIPTION - PAIN TYPE: TYPE: CHRONIC PAIN

## 2019-07-12 ASSESSMENT — PAIN DESCRIPTION - PROGRESSION: CLINICAL_PROGRESSION: NOT CHANGED

## 2019-07-12 ASSESSMENT — PAIN DESCRIPTION - ONSET: ONSET: ON-GOING

## 2019-07-12 ASSESSMENT — PAIN DESCRIPTION - LOCATION: LOCATION: CHEST

## 2019-07-12 ASSESSMENT — PAIN - FUNCTIONAL ASSESSMENT: PAIN_FUNCTIONAL_ASSESSMENT: ACTIVITIES ARE NOT PREVENTED

## 2019-07-12 ASSESSMENT — PAIN DESCRIPTION - FREQUENCY: FREQUENCY: INTERMITTENT

## 2019-07-12 NOTE — PROGRESS NOTES
Occupational Therapy  Facility/Department: [facility] MED SURG UNIT  Daily Treatment Note  NAME: Vidhi Gold  : 1945  MRN: [id number]    Date of Service: 2019    Discharge Recommendations:  Subacute/Skilled Nursing Facility       Assessment   Performance deficits / Impairments: Decreased functional mobility ; Decreased ADL status; Decreased strength;Decreased safe awareness;Decreased endurance;Decreased balance;Decreased coordination  Assessment: Pt requested to have shower today before she goes out on a pass with her family. Therapist assisted. Pt's O2 sats post shower desatted to 85% on 4L O2, after 5min, her sats only increased to 81%- therapist informed pt's nurse right away. Therapist guided pt through pursed lip breathing techniques to assist with pt's O2 recovery. Otherwise, pt did tolerate her bathing well, therapist only SBA/Spv and pt able to complete bathing seated on shower chair. Pt reported she is stressed and still coping with her 's recent passing so her anxiety is significant at this time which seems to be affecting her breathing. Pt is aware of this and states \"I get stressed and then don't breathe right\". Pt's biggest barrier cont's to be her desatting O2 sats/poor endurance. Pt reports she will be moving to her daughter's house upon d/c and stated \"My family will be taking turns to make sure I'm never home alone\". Prognosis: Good  History: multi comorb  Exam: 7 perf def/impair  REQUIRES OT FOLLOW UP: Yes         Patient Diagnosis(es): There were no encounter diagnoses. has a past medical history of COPD (chronic obstructive pulmonary disease) ([facility] [address] and Lung cancer ([facility] [address] has a past surgical history that includes Hysterectomy; Appendectomy; Tonsillectomy; and bronchoscopy (N/A, 2019).     Restrictions  Restrictions/Precautions  Restrictions/Precautions: Fall Risk  Position Activity Restriction  Other position/activity restrictions: Monitor O2 sats  Subjective   General  Chart

## 2019-07-13 PROCEDURE — 6360000002 HC RX W HCPCS: Performed by: INTERNAL MEDICINE

## 2019-07-13 PROCEDURE — 94669 MECHANICAL CHEST WALL OSCILL: CPT

## 2019-07-13 PROCEDURE — 6370000000 HC RX 637 (ALT 250 FOR IP): Performed by: INTERNAL MEDICINE

## 2019-07-13 PROCEDURE — 97110 THERAPEUTIC EXERCISES: CPT

## 2019-07-13 PROCEDURE — 1200000002 HC SEMI PRIVATE SWING BED

## 2019-07-13 PROCEDURE — 94640 AIRWAY INHALATION TREATMENT: CPT

## 2019-07-13 PROCEDURE — 97116 GAIT TRAINING THERAPY: CPT

## 2019-07-13 RX ADMIN — PREDNISONE 30 MG: 10 TABLET ORAL at 08:15

## 2019-07-13 RX ADMIN — ALPRAZOLAM 0.25 MG: 0.25 TABLET ORAL at 06:02

## 2019-07-13 RX ADMIN — FOLIC ACID 1 MG: 1 TABLET ORAL at 08:15

## 2019-07-13 RX ADMIN — HYDROCODONE BITARTRATE AND ACETAMINOPHEN 1 TABLET: 5; 325 TABLET ORAL at 17:07

## 2019-07-13 RX ADMIN — GUAIFENESIN 600 MG: 600 TABLET, EXTENDED RELEASE ORAL at 21:26

## 2019-07-13 RX ADMIN — ASPIRIN 81 MG 81 MG: 81 TABLET ORAL at 08:15

## 2019-07-13 RX ADMIN — ALPRAZOLAM 0.25 MG: 0.25 TABLET ORAL at 17:07

## 2019-07-13 RX ADMIN — BUDESONIDE 500 MCG: 0.5 INHALANT RESPIRATORY (INHALATION) at 18:23

## 2019-07-13 RX ADMIN — GUAIFENESIN 600 MG: 600 TABLET, EXTENDED RELEASE ORAL at 08:15

## 2019-07-13 RX ADMIN — IPRATROPIUM BROMIDE AND ALBUTEROL SULFATE 1 AMPULE: .5; 3 SOLUTION RESPIRATORY (INHALATION) at 14:14

## 2019-07-13 RX ADMIN — MULTIPLE VITAMINS W/ MINERALS TAB 1 TABLET: TAB at 08:15

## 2019-07-13 RX ADMIN — FLUTICASONE PROPIONATE 2 SPRAY: 50 SPRAY, METERED NASAL at 08:14

## 2019-07-13 RX ADMIN — IPRATROPIUM BROMIDE AND ALBUTEROL SULFATE 1 AMPULE: .5; 3 SOLUTION RESPIRATORY (INHALATION) at 06:19

## 2019-07-13 RX ADMIN — IPRATROPIUM BROMIDE AND ALBUTEROL SULFATE 1 AMPULE: .5; 3 SOLUTION RESPIRATORY (INHALATION) at 09:59

## 2019-07-13 RX ADMIN — HYDROCODONE BITARTRATE AND ACETAMINOPHEN 1 TABLET: 5; 325 TABLET ORAL at 06:02

## 2019-07-13 RX ADMIN — IPRATROPIUM BROMIDE AND ALBUTEROL SULFATE 1 AMPULE: .5; 3 SOLUTION RESPIRATORY (INHALATION) at 18:23

## 2019-07-13 RX ADMIN — ENOXAPARIN SODIUM 40 MG: 40 INJECTION SUBCUTANEOUS at 21:25

## 2019-07-13 RX ADMIN — PANTOPRAZOLE SODIUM 40 MG: 40 TABLET, DELAYED RELEASE ORAL at 05:58

## 2019-07-13 RX ADMIN — AMLODIPINE BESYLATE 2.5 MG: 2.5 TABLET ORAL at 08:15

## 2019-07-13 RX ADMIN — BUDESONIDE 500 MCG: 0.5 INHALANT RESPIRATORY (INHALATION) at 06:19

## 2019-07-13 RX ADMIN — ESCITALOPRAM OXALATE 5 MG: 10 TABLET ORAL at 08:15

## 2019-07-13 ASSESSMENT — PAIN DESCRIPTION - LOCATION
LOCATION: CHEST
LOCATION: CHEST

## 2019-07-13 ASSESSMENT — PAIN DESCRIPTION - FREQUENCY
FREQUENCY: INTERMITTENT
FREQUENCY: INTERMITTENT

## 2019-07-13 ASSESSMENT — PAIN - FUNCTIONAL ASSESSMENT
PAIN_FUNCTIONAL_ASSESSMENT: ACTIVITIES ARE NOT PREVENTED
PAIN_FUNCTIONAL_ASSESSMENT: ACTIVITIES ARE NOT PREVENTED

## 2019-07-13 ASSESSMENT — PAIN SCALES - GENERAL
PAINLEVEL_OUTOF10: 5
PAINLEVEL_OUTOF10: 6
PAINLEVEL_OUTOF10: 5
PAINLEVEL_OUTOF10: 7
PAINLEVEL_OUTOF10: 0

## 2019-07-13 ASSESSMENT — PAIN DESCRIPTION - PROGRESSION
CLINICAL_PROGRESSION: NOT CHANGED
CLINICAL_PROGRESSION: NOT CHANGED

## 2019-07-13 ASSESSMENT — PAIN DESCRIPTION - ORIENTATION: ORIENTATION: MID

## 2019-07-13 ASSESSMENT — PAIN DESCRIPTION - PAIN TYPE
TYPE: CHRONIC PAIN
TYPE: CHRONIC PAIN

## 2019-07-13 ASSESSMENT — PAIN DESCRIPTION - ONSET
ONSET: ON-GOING
ONSET: ON-GOING

## 2019-07-13 ASSESSMENT — PAIN DESCRIPTION - DESCRIPTORS
DESCRIPTORS: PRESSURE
DESCRIPTORS: PRESSURE

## 2019-07-13 NOTE — PROGRESS NOTES
SPO2 monitored throughout seated Ther Ex with levels maintained between 85-91%                        G-Code     OutComes Score                                                     AM-PAC Score             Goals  Short term goals  Time Frame for Short term goals: 1 week  Short term goal 1: Transfers and bed mobility with CS without cues for managing O2 tubing  Short term goal 2: amb >=40ft with pulse ox >90% post  Short term goal 3: tolerate x10 seated LE SHARON with rests and pulse ox >90%  Short term goal 4: asess 2 stairs with 2 rails and <= CGA of 1, check pulse ox post  Long term goals  Time Frame for Long term goals : 2 weeks  Long term goal 1: Independent bed mobility and transfers  Long term goal 2: amb >= 70ft with least AD with least AD maiantaing O2>90% and managing tubing independently  Long term goal 3: 2 stairs without rails with <= CGA of 1 person  Long term goal 4: increase LE stregntha nd endurance any to achieve fucntional goals  Long term goal 5: HEP in place for discharge. Patient Goals   Patient goals : \"I need to be stronger so I can get home. I will take any equipment you can get me, but I do not have any money. \"    Plan    Plan  Times per week: 5-7x week  Times per day: (1-2)  Plan weeks: 2  Current Treatment Recommendations: Strengthening, Gait Training, Stair training, Balance Training, Endurance Training, Functional Mobility Training, Transfer Training, Safety Education & Training, Home Exercise Program, Patient/Caregiver Education & Training  Safety Devices  Type of devices:  All fall risk precautions in place, Call light within reach     Therapy Time   Individual Concurrent Group Co-treatment   Time In  855         Time Out  945         Minutes  [address], PTA  License and [address] Number: [phone]

## 2019-07-14 PROCEDURE — 97530 THERAPEUTIC ACTIVITIES: CPT

## 2019-07-14 PROCEDURE — 94640 AIRWAY INHALATION TREATMENT: CPT

## 2019-07-14 PROCEDURE — 1200000002 HC SEMI PRIVATE SWING BED

## 2019-07-14 PROCEDURE — 97110 THERAPEUTIC EXERCISES: CPT

## 2019-07-14 PROCEDURE — 2700000000 HC OXYGEN THERAPY PER DAY

## 2019-07-14 PROCEDURE — 97116 GAIT TRAINING THERAPY: CPT

## 2019-07-14 PROCEDURE — 6370000000 HC RX 637 (ALT 250 FOR IP): Performed by: INTERNAL MEDICINE

## 2019-07-14 PROCEDURE — 94669 MECHANICAL CHEST WALL OSCILL: CPT

## 2019-07-14 PROCEDURE — 6360000002 HC RX W HCPCS: Performed by: INTERNAL MEDICINE

## 2019-07-14 RX ADMIN — PANTOPRAZOLE SODIUM 40 MG: 40 TABLET, DELAYED RELEASE ORAL at 06:39

## 2019-07-14 RX ADMIN — IPRATROPIUM BROMIDE AND ALBUTEROL SULFATE 1 AMPULE: .5; 3 SOLUTION RESPIRATORY (INHALATION) at 05:59

## 2019-07-14 RX ADMIN — PREDNISONE 30 MG: 10 TABLET ORAL at 09:33

## 2019-07-14 RX ADMIN — BUDESONIDE 500 MCG: 0.5 INHALANT RESPIRATORY (INHALATION) at 05:59

## 2019-07-14 RX ADMIN — ALPRAZOLAM 0.25 MG: 0.25 TABLET ORAL at 03:53

## 2019-07-14 RX ADMIN — FOLIC ACID 1 MG: 1 TABLET ORAL at 09:33

## 2019-07-14 RX ADMIN — ENOXAPARIN SODIUM 40 MG: 40 INJECTION SUBCUTANEOUS at 21:50

## 2019-07-14 RX ADMIN — GUAIFENESIN 600 MG: 600 TABLET, EXTENDED RELEASE ORAL at 21:51

## 2019-07-14 RX ADMIN — ESCITALOPRAM OXALATE 5 MG: 10 TABLET ORAL at 09:33

## 2019-07-14 RX ADMIN — MULTIPLE VITAMINS W/ MINERALS TAB 1 TABLET: TAB at 09:33

## 2019-07-14 RX ADMIN — HYDROCODONE BITARTRATE AND ACETAMINOPHEN 1 TABLET: 5; 325 TABLET ORAL at 12:54

## 2019-07-14 RX ADMIN — ASPIRIN 81 MG 81 MG: 81 TABLET ORAL at 09:32

## 2019-07-14 RX ADMIN — IPRATROPIUM BROMIDE AND ALBUTEROL SULFATE 1 AMPULE: .5; 3 SOLUTION RESPIRATORY (INHALATION) at 18:04

## 2019-07-14 RX ADMIN — IPRATROPIUM BROMIDE AND ALBUTEROL SULFATE 1 AMPULE: .5; 3 SOLUTION RESPIRATORY (INHALATION) at 10:16

## 2019-07-14 RX ADMIN — IPRATROPIUM BROMIDE AND ALBUTEROL SULFATE 1 AMPULE: .5; 3 SOLUTION RESPIRATORY (INHALATION) at 14:05

## 2019-07-14 RX ADMIN — GUAIFENESIN 600 MG: 600 TABLET, EXTENDED RELEASE ORAL at 09:33

## 2019-07-14 RX ADMIN — FLUTICASONE PROPIONATE 2 SPRAY: 50 SPRAY, METERED NASAL at 16:06

## 2019-07-14 RX ADMIN — HYDROCODONE BITARTRATE AND ACETAMINOPHEN 1 TABLET: 5; 325 TABLET ORAL at 03:52

## 2019-07-14 RX ADMIN — HYDROCODONE BITARTRATE AND ACETAMINOPHEN 1 TABLET: 5; 325 TABLET ORAL at 21:51

## 2019-07-14 RX ADMIN — AMLODIPINE BESYLATE 2.5 MG: 2.5 TABLET ORAL at 09:33

## 2019-07-14 RX ADMIN — ALPRAZOLAM 0.25 MG: 0.25 TABLET ORAL at 12:54

## 2019-07-14 RX ADMIN — ALPRAZOLAM 0.25 MG: 0.25 TABLET ORAL at 21:51

## 2019-07-14 RX ADMIN — BUDESONIDE 500 MCG: 0.5 INHALANT RESPIRATORY (INHALATION) at 18:04

## 2019-07-14 ASSESSMENT — PAIN SCALES - GENERAL
PAINLEVEL_OUTOF10: 5
PAINLEVEL_OUTOF10: 6
PAINLEVEL_OUTOF10: 5

## 2019-07-15 LAB
ANION GAP SERPL CALCULATED.3IONS-SCNC: 11 MEQ/L (ref 9–15)
BASOPHILS ABSOLUTE: 0 K/UL (ref 0–0.2)
BASOPHILS RELATIVE PERCENT: 0 %
BUN BLDV-MCNC: 24 MG/DL (ref 8–23)
CALCIUM SERPL-MCNC: 9.2 MG/DL (ref 8.5–9.9)
CHLORIDE BLD-SCNC: 105 MEQ/L (ref 95–107)
CO2: 24 MEQ/L (ref 20–31)
CREAT SERPL-MCNC: 0.52 MG/DL (ref 0.5–0.9)
EOSINOPHILS ABSOLUTE: 0.1 K/UL (ref 0–0.7)
EOSINOPHILS RELATIVE PERCENT: 0.7 %
GFR AFRICAN AMERICAN: >60
GFR NON-AFRICAN AMERICAN: >60
GLUCOSE BLD-MCNC: 100 MG/DL (ref 70–99)
HCT VFR BLD CALC: 34.7 % (ref 37–47)
HEMOGLOBIN: 11.4 G/DL (ref 12–16)
LYMPHOCYTES ABSOLUTE: 0.6 K/UL (ref 1–4.8)
LYMPHOCYTES RELATIVE PERCENT: 6.9 %
MCH RBC QN AUTO: 32.5 PG (ref 27–31.3)
MCHC RBC AUTO-ENTMCNC: 32.9 % (ref 33–37)
MCV RBC AUTO: 99 FL (ref 82–100)
MONOCYTES ABSOLUTE: 0.4 K/UL (ref 0.2–0.8)
MONOCYTES RELATIVE PERCENT: 5.1 %
NEUTROPHILS ABSOLUTE: 7.1 K/UL (ref 1.4–6.5)
NEUTROPHILS RELATIVE PERCENT: 87.3 %
PDW BLD-RTO: 13.3 % (ref 11.5–14.5)
PLATELET # BLD: 278 K/UL (ref 130–400)
POTASSIUM SERPL-SCNC: 3.6 MEQ/L (ref 3.4–4.9)
RBC # BLD: 3.51 M/UL (ref 4.2–5.4)
SODIUM BLD-SCNC: 140 MEQ/L (ref 135–144)
WBC # BLD: 8.2 K/UL (ref 4.8–10.8)

## 2019-07-15 PROCEDURE — 36415 COLL VENOUS BLD VENIPUNCTURE: CPT

## 2019-07-15 PROCEDURE — 97116 GAIT TRAINING THERAPY: CPT

## 2019-07-15 PROCEDURE — 97110 THERAPEUTIC EXERCISES: CPT

## 2019-07-15 PROCEDURE — 6370000000 HC RX 637 (ALT 250 FOR IP): Performed by: INTERNAL MEDICINE

## 2019-07-15 PROCEDURE — 1200000002 HC SEMI PRIVATE SWING BED

## 2019-07-15 PROCEDURE — 97530 THERAPEUTIC ACTIVITIES: CPT

## 2019-07-15 PROCEDURE — 97535 SELF CARE MNGMENT TRAINING: CPT

## 2019-07-15 PROCEDURE — 85025 COMPLETE CBC W/AUTO DIFF WBC: CPT

## 2019-07-15 PROCEDURE — 6360000002 HC RX W HCPCS: Performed by: INTERNAL MEDICINE

## 2019-07-15 PROCEDURE — 94640 AIRWAY INHALATION TREATMENT: CPT

## 2019-07-15 PROCEDURE — 80048 BASIC METABOLIC PNL TOTAL CA: CPT

## 2019-07-15 PROCEDURE — 2700000000 HC OXYGEN THERAPY PER DAY

## 2019-07-15 RX ORDER — PREDNISONE 10 MG/1
10 TABLET ORAL ONCE
Status: COMPLETED | OUTPATIENT
Start: 2019-07-15 | End: 2019-07-15

## 2019-07-15 RX ORDER — PREDNISONE 20 MG/1
20 TABLET ORAL DAILY
Status: DISCONTINUED | OUTPATIENT
Start: 2019-07-16 | End: 2019-07-15

## 2019-07-15 RX ORDER — PREDNISONE 20 MG/1
40 TABLET ORAL DAILY
Status: DISCONTINUED | OUTPATIENT
Start: 2019-07-16 | End: 2019-07-20

## 2019-07-15 RX ADMIN — ESCITALOPRAM OXALATE 5 MG: 10 TABLET ORAL at 09:46

## 2019-07-15 RX ADMIN — BUDESONIDE 500 MCG: 0.5 INHALANT RESPIRATORY (INHALATION) at 17:59

## 2019-07-15 RX ADMIN — BUDESONIDE 500 MCG: 0.5 INHALANT RESPIRATORY (INHALATION) at 05:40

## 2019-07-15 RX ADMIN — PREDNISONE 10 MG: 10 TABLET ORAL at 14:42

## 2019-07-15 RX ADMIN — FOLIC ACID 1 MG: 1 TABLET ORAL at 09:47

## 2019-07-15 RX ADMIN — AMLODIPINE BESYLATE 2.5 MG: 2.5 TABLET ORAL at 09:44

## 2019-07-15 RX ADMIN — HYDROCODONE BITARTRATE AND ACETAMINOPHEN 1 TABLET: 5; 325 TABLET ORAL at 19:09

## 2019-07-15 RX ADMIN — IPRATROPIUM BROMIDE AND ALBUTEROL SULFATE 1 AMPULE: .5; 3 SOLUTION RESPIRATORY (INHALATION) at 09:30

## 2019-07-15 RX ADMIN — ENOXAPARIN SODIUM 40 MG: 40 INJECTION SUBCUTANEOUS at 20:46

## 2019-07-15 RX ADMIN — GUAIFENESIN 600 MG: 600 TABLET, EXTENDED RELEASE ORAL at 20:46

## 2019-07-15 RX ADMIN — MULTIPLE VITAMINS W/ MINERALS TAB 1 TABLET: TAB at 09:46

## 2019-07-15 RX ADMIN — HYDROCODONE BITARTRATE AND ACETAMINOPHEN 1 TABLET: 5; 325 TABLET ORAL at 09:45

## 2019-07-15 RX ADMIN — IPRATROPIUM BROMIDE AND ALBUTEROL SULFATE 1 AMPULE: .5; 3 SOLUTION RESPIRATORY (INHALATION) at 13:43

## 2019-07-15 RX ADMIN — ASPIRIN 81 MG 81 MG: 81 TABLET ORAL at 09:43

## 2019-07-15 RX ADMIN — ALPRAZOLAM 0.25 MG: 0.25 TABLET ORAL at 19:10

## 2019-07-15 RX ADMIN — PREDNISONE 30 MG: 10 TABLET ORAL at 09:43

## 2019-07-15 RX ADMIN — PANTOPRAZOLE SODIUM 40 MG: 40 TABLET, DELAYED RELEASE ORAL at 05:49

## 2019-07-15 RX ADMIN — GUAIFENESIN 600 MG: 600 TABLET, EXTENDED RELEASE ORAL at 09:42

## 2019-07-15 RX ADMIN — IPRATROPIUM BROMIDE AND ALBUTEROL SULFATE 1 AMPULE: .5; 3 SOLUTION RESPIRATORY (INHALATION) at 05:40

## 2019-07-15 RX ADMIN — ALPRAZOLAM 0.25 MG: 0.25 TABLET ORAL at 09:44

## 2019-07-15 RX ADMIN — FLUTICASONE PROPIONATE 2 SPRAY: 50 SPRAY, METERED NASAL at 14:42

## 2019-07-15 RX ADMIN — IPRATROPIUM BROMIDE AND ALBUTEROL SULFATE 1 AMPULE: .5; 3 SOLUTION RESPIRATORY (INHALATION) at 17:59

## 2019-07-15 ASSESSMENT — PAIN - FUNCTIONAL ASSESSMENT: PAIN_FUNCTIONAL_ASSESSMENT: ACTIVITIES ARE NOT PREVENTED

## 2019-07-15 ASSESSMENT — PAIN DESCRIPTION - PAIN TYPE: TYPE: CHRONIC PAIN

## 2019-07-15 ASSESSMENT — PAIN DESCRIPTION - ONSET: ONSET: ON-GOING

## 2019-07-15 ASSESSMENT — PAIN SCALES - GENERAL
PAINLEVEL_OUTOF10: 0
PAINLEVEL_OUTOF10: 0
PAINLEVEL_OUTOF10: 6
PAINLEVEL_OUTOF10: 6

## 2019-07-15 ASSESSMENT — PAIN DESCRIPTION - DESCRIPTORS: DESCRIPTORS: ACHING;SORE

## 2019-07-15 ASSESSMENT — PAIN DESCRIPTION - PROGRESSION: CLINICAL_PROGRESSION: GRADUALLY WORSENING

## 2019-07-15 ASSESSMENT — PAIN DESCRIPTION - FREQUENCY: FREQUENCY: INTERMITTENT

## 2019-07-15 ASSESSMENT — PAIN DESCRIPTION - LOCATION: LOCATION: GENERALIZED

## 2019-07-15 NOTE — PROGRESS NOTES
Pt up,in bed around 9 am. Pt receiving her morning meds. Pt also states she is having some anxiety. Pt got up to go to restroom. Pt came back from bathroom with puls-ox of 63 precent. LPN instructed pt to breath in through the nose  and out through the mouth slowly. Saturation came up to 83. LPN then called Respiratory. LPN then spoke to Dr. Theodore Pinedo about pts low pulse- ox. Dr instructed to turn O2 up to 5 liters and back to 4.5 when pulse -ox goes up. It went up to 91 percent and maintaned. LPN turned O2 down to 4.5 again. Pt received new orders for prednisone.

## 2019-07-15 NOTE — PROGRESS NOTES
Occupational Therapy  Facility/Department: [facility] MED SURG UNIT  Daily Treatment Note  NAME: Ruthie Juarez  : 1945  MRN: [id number]    Date of Service: 7/15/2019    Discharge Recommendations:  Subacute/Skilled Nursing Facility       Assessment      REQUIRES OT FOLLOW UP: Yes     Pt. Is cooperative during OT session  Pt. Is on O2  Pt. Needs frequent rest breaks due to increased SOB and fatigue  Pt. Needs vc for proper breathing techniques   Pt. Needs vc for energy conservation techniques to decrease   Pt.'s O2 stayed at 87% or higher throughout OT session    Patient Diagnosis(es): There were no encounter diagnoses. has a past medical history of COPD (chronic obstructive pulmonary disease) ([facility] [address] and Lung cancer ([facility] [address] has a past surgical history that includes Hysterectomy; Appendectomy; Tonsillectomy; and bronchoscopy (N/A, 2019). Restrictions  Restrictions/Precautions  Restrictions/Precautions: Femoral Block  Position Activity Restriction  Other position/activity restrictions: Monitor O2 sats  Subjective   General  Chart Reviewed: Yes  Patient assessed for rehabilitation services? : (in subacute as of 19)  Additional Pertinent Hx: Was diagnosed with lung cancer at the beginning of this year  Family / Caregiver Present: No  Referring Practitioner: Dr. June Sharif   Diagnosis: COPD exacerbation, pneumonia  Subjective  Subjective: Pt. Is agreeable to OT      Orientation     Objective    ADL  LE Dressing: Contact guard assistance pulling up pants  Toileting: Stand by assistance  Additional Comments: (Toilet transfer-CGA/SBA with vc)  Fine motor UB activity with BUE sitting EOB -supervision with rest breaks due to increased fatigue and SOB             Trained and educated pt. In energy conservation techniques to decrease fatigue  Trained and educated pt.  In breathing techniques to decrease SOB                                               Type of ROM/Therapeutic Exercise  Type of ROM/Therapeutic Exercise:

## 2019-07-16 PROCEDURE — 97116 GAIT TRAINING THERAPY: CPT

## 2019-07-16 PROCEDURE — 6360000002 HC RX W HCPCS: Performed by: INTERNAL MEDICINE

## 2019-07-16 PROCEDURE — 97110 THERAPEUTIC EXERCISES: CPT

## 2019-07-16 PROCEDURE — 6370000000 HC RX 637 (ALT 250 FOR IP): Performed by: INTERNAL MEDICINE

## 2019-07-16 PROCEDURE — 94640 AIRWAY INHALATION TREATMENT: CPT

## 2019-07-16 PROCEDURE — 97530 THERAPEUTIC ACTIVITIES: CPT

## 2019-07-16 PROCEDURE — 2700000000 HC OXYGEN THERAPY PER DAY

## 2019-07-16 PROCEDURE — 1200000002 HC SEMI PRIVATE SWING BED

## 2019-07-16 PROCEDURE — 97535 SELF CARE MNGMENT TRAINING: CPT

## 2019-07-16 RX ADMIN — MULTIPLE VITAMINS W/ MINERALS TAB 1 TABLET: TAB at 08:58

## 2019-07-16 RX ADMIN — IPRATROPIUM BROMIDE AND ALBUTEROL SULFATE 1 AMPULE: .5; 3 SOLUTION RESPIRATORY (INHALATION) at 18:19

## 2019-07-16 RX ADMIN — BUDESONIDE 500 MCG: 0.5 INHALANT RESPIRATORY (INHALATION) at 18:19

## 2019-07-16 RX ADMIN — GUAIFENESIN 600 MG: 600 TABLET, EXTENDED RELEASE ORAL at 20:09

## 2019-07-16 RX ADMIN — IPRATROPIUM BROMIDE AND ALBUTEROL SULFATE 1 AMPULE: .5; 3 SOLUTION RESPIRATORY (INHALATION) at 10:01

## 2019-07-16 RX ADMIN — ALPRAZOLAM 0.25 MG: 0.25 TABLET ORAL at 06:23

## 2019-07-16 RX ADMIN — PREDNISONE 40 MG: 20 TABLET ORAL at 08:57

## 2019-07-16 RX ADMIN — IPRATROPIUM BROMIDE AND ALBUTEROL SULFATE 1 AMPULE: .5; 3 SOLUTION RESPIRATORY (INHALATION) at 14:51

## 2019-07-16 RX ADMIN — POLYETHYLENE GLYCOL 3350 17 G: 17 POWDER, FOR SOLUTION ORAL at 08:59

## 2019-07-16 RX ADMIN — GUAIFENESIN 600 MG: 600 TABLET, EXTENDED RELEASE ORAL at 08:58

## 2019-07-16 RX ADMIN — ALPRAZOLAM 0.25 MG: 0.25 TABLET ORAL at 20:08

## 2019-07-16 RX ADMIN — HYDROCODONE BITARTRATE AND ACETAMINOPHEN 1 TABLET: 5; 325 TABLET ORAL at 13:28

## 2019-07-16 RX ADMIN — FLUTICASONE PROPIONATE 2 SPRAY: 50 SPRAY, METERED NASAL at 13:11

## 2019-07-16 RX ADMIN — IPRATROPIUM BROMIDE AND ALBUTEROL SULFATE 1 AMPULE: .5; 3 SOLUTION RESPIRATORY (INHALATION) at 06:09

## 2019-07-16 RX ADMIN — HYDROCODONE BITARTRATE AND ACETAMINOPHEN 1 TABLET: 5; 325 TABLET ORAL at 20:09

## 2019-07-16 RX ADMIN — ALPRAZOLAM 0.25 MG: 0.25 TABLET ORAL at 13:28

## 2019-07-16 RX ADMIN — ASPIRIN 81 MG 81 MG: 81 TABLET ORAL at 08:58

## 2019-07-16 RX ADMIN — FOLIC ACID 1 MG: 1 TABLET ORAL at 08:56

## 2019-07-16 RX ADMIN — PANTOPRAZOLE SODIUM 40 MG: 40 TABLET, DELAYED RELEASE ORAL at 06:23

## 2019-07-16 RX ADMIN — BUDESONIDE 500 MCG: 0.5 INHALANT RESPIRATORY (INHALATION) at 06:09

## 2019-07-16 RX ADMIN — ESCITALOPRAM OXALATE 5 MG: 10 TABLET ORAL at 08:57

## 2019-07-16 RX ADMIN — ENOXAPARIN SODIUM 40 MG: 40 INJECTION SUBCUTANEOUS at 20:09

## 2019-07-16 RX ADMIN — HYDROCODONE BITARTRATE AND ACETAMINOPHEN 1 TABLET: 5; 325 TABLET ORAL at 06:23

## 2019-07-16 ASSESSMENT — PAIN DESCRIPTION - ONSET: ONSET: ON-GOING

## 2019-07-16 ASSESSMENT — PAIN DESCRIPTION - DESCRIPTORS: DESCRIPTORS: ACHING;SORE

## 2019-07-16 ASSESSMENT — PAIN SCALES - GENERAL
PAINLEVEL_OUTOF10: 0
PAINLEVEL_OUTOF10: 5
PAINLEVEL_OUTOF10: 0
PAINLEVEL_OUTOF10: 0
PAINLEVEL_OUTOF10: 6
PAINLEVEL_OUTOF10: 5

## 2019-07-16 ASSESSMENT — PAIN DESCRIPTION - PROGRESSION
CLINICAL_PROGRESSION: NOT CHANGED
CLINICAL_PROGRESSION: NOT CHANGED

## 2019-07-16 ASSESSMENT — PAIN DESCRIPTION - LOCATION: LOCATION: GENERALIZED

## 2019-07-16 ASSESSMENT — PAIN DESCRIPTION - FREQUENCY: FREQUENCY: INTERMITTENT

## 2019-07-16 ASSESSMENT — PAIN - FUNCTIONAL ASSESSMENT: PAIN_FUNCTIONAL_ASSESSMENT: ACTIVITIES ARE NOT PREVENTED

## 2019-07-16 ASSESSMENT — PAIN DESCRIPTION - PAIN TYPE: TYPE: CHRONIC PAIN

## 2019-07-16 NOTE — PROGRESS NOTES
hand placement   Pt. Tolerated standing for 2 minutes with ww  Pt. Needs rest breaks due to increased fatigue and SOB  Pt. Requires extra time to complete tasks  Trained and educated pt. In breathing techniques to decrease SOB  Trained and educated pt. In energy conservation techniques to decrease fatigue                       Plan   Plan  Times per week: 3-6x/wk  Times per day: Daily  Plan weeks: 2 wks  Current Treatment Recommendations: Strengthening, ROM, Balance Training, Functional Mobility Training, Endurance Training, Stair training, Pain Management, Safety Education & Training, Patient/Caregiver Education & Training, Self-Care / ADL, Home Management Training  G-Code     OutComes Score                                                  AM-PAC Score             Goals  Short term goals  Time Frame for Short term goals: 1 wk  Short term goal 1: Tolerate 25-30min BUE ther ex/act, while maintaining O2 sats 90% or above, to increase strength/end for ADL  Short term goal 2: Increase to SBA/Spv toileting  Short term goal 3: SBA/Spv LB dressing  Short term goal 4: Tolerate 2-4hr seated in chair/OOB time for increased act valerie/end  Long term goals  Time Frame for Long term goals : 2 wks  Long term goal 1: I/MI fxl ADL xfers  Long term goal 2: Increase to 5-7min stand valerie/end, while maintaining O2 sats 90% or above, to increase I with ADL  Long term goal 3: I/MI toileting  Long term goal 4: I/MI LB dressing and bathing  Long term goal 5: I BUE HEP  Patient Goals   Patient goals :  To return home when better       Therapy Time   Individual Concurrent Group Co-treatment   Time In  3:30pm         Time Out  4:30pm         Minutes  [address] [facility]  Number: [phone]

## 2019-07-16 NOTE — PROGRESS NOTES
Physical Therapy  Facility/Department: [facility] MED SURG UNIT  Daily Treatment Note  NAME: Alexandria Xiong  : 1945  MRN: [id number]    Date of Service: 2019    Discharge Recommendations:  Home with assist PRN, Home with Home health PT        Assessment   Body structures, Functions, Activity limitations: Decreased functional mobility ; Decreased strength;Decreased endurance;Decreased balance  Assessment: Pt. limited by endurance this date and participation in therapy is limited due to endurance. Short distance to RR ambualtion tolerated this date but desat in 66's. Pt. requires 5 min rest prior to performing pericare. Deferred further gait due to decreased endurance. Perofrmed stand pivot transfer from toilet to [facility] and then from [facility] to bed. Pt. at 81% post retrun to sit EOB and with focus on PLB pt. unable to recover past 86%. PPt. returned to bed with HOB elevated approx 60 deg. Pt. reports due for breathing treatment. Nursing notified of SPO2 levels. Prognosis: Good  REQUIRES PT FOLLOW UP: Yes  Activity Tolerance  Activity Tolerance: Patient limited by endurance     Patient Diagnosis(es): There were no encounter diagnoses. has a past medical history of COPD (chronic obstructive pulmonary disease) ([facility] [address] and Lung cancer ([facility] [address] has a past surgical history that includes Hysterectomy; Appendectomy; Tonsillectomy; and bronchoscopy (N/A, 2019). Restrictions  Restrictions/Precautions  Restrictions/Precautions: Femoral Block  Position Activity Restriction  Other position/activity restrictions: Monitor O2 sats  Subjective   General  Chart Reviewed: Yes  Family / Caregiver Present: No  Referring Practitioner: Dr Corona Clamp: Pt. up in bed agreeable to PT session and states needs to use RR prior to therapy. General Comment  Comments: Pt. at 91% SPO2 upon arrival on 4L O2 via nasal canula.             Orientation     Cognition      Objective      Transfers  Sit to Stand:

## 2019-07-17 PROCEDURE — 6360000002 HC RX W HCPCS: Performed by: INTERNAL MEDICINE

## 2019-07-17 PROCEDURE — 97116 GAIT TRAINING THERAPY: CPT

## 2019-07-17 PROCEDURE — 97530 THERAPEUTIC ACTIVITIES: CPT

## 2019-07-17 PROCEDURE — 1200000002 HC SEMI PRIVATE SWING BED

## 2019-07-17 PROCEDURE — 97110 THERAPEUTIC EXERCISES: CPT

## 2019-07-17 PROCEDURE — 6370000000 HC RX 637 (ALT 250 FOR IP): Performed by: INTERNAL MEDICINE

## 2019-07-17 PROCEDURE — 97535 SELF CARE MNGMENT TRAINING: CPT

## 2019-07-17 PROCEDURE — 94640 AIRWAY INHALATION TREATMENT: CPT

## 2019-07-17 RX ADMIN — ALPRAZOLAM 0.25 MG: 0.25 TABLET ORAL at 12:18

## 2019-07-17 RX ADMIN — IPRATROPIUM BROMIDE AND ALBUTEROL SULFATE 1 AMPULE: .5; 3 SOLUTION RESPIRATORY (INHALATION) at 06:11

## 2019-07-17 RX ADMIN — ESCITALOPRAM OXALATE 5 MG: 10 TABLET ORAL at 09:46

## 2019-07-17 RX ADMIN — ALPRAZOLAM 0.25 MG: 0.25 TABLET ORAL at 21:13

## 2019-07-17 RX ADMIN — IPRATROPIUM BROMIDE AND ALBUTEROL SULFATE 1 AMPULE: .5; 3 SOLUTION RESPIRATORY (INHALATION) at 14:29

## 2019-07-17 RX ADMIN — MULTIPLE VITAMINS W/ MINERALS TAB 1 TABLET: TAB at 09:47

## 2019-07-17 RX ADMIN — AMLODIPINE BESYLATE 2.5 MG: 2.5 TABLET ORAL at 09:47

## 2019-07-17 RX ADMIN — IPRATROPIUM BROMIDE AND ALBUTEROL SULFATE 1 AMPULE: .5; 3 SOLUTION RESPIRATORY (INHALATION) at 10:06

## 2019-07-17 RX ADMIN — HYDROCODONE BITARTRATE AND ACETAMINOPHEN 1 TABLET: 5; 325 TABLET ORAL at 12:18

## 2019-07-17 RX ADMIN — FLUTICASONE PROPIONATE 2 SPRAY: 50 SPRAY, METERED NASAL at 16:23

## 2019-07-17 RX ADMIN — ENOXAPARIN SODIUM 40 MG: 40 INJECTION SUBCUTANEOUS at 21:13

## 2019-07-17 RX ADMIN — PREDNISONE 40 MG: 20 TABLET ORAL at 09:47

## 2019-07-17 RX ADMIN — GUAIFENESIN 600 MG: 600 TABLET, EXTENDED RELEASE ORAL at 09:47

## 2019-07-17 RX ADMIN — HYDROCODONE BITARTRATE AND ACETAMINOPHEN 1 TABLET: 5; 325 TABLET ORAL at 04:05

## 2019-07-17 RX ADMIN — BUDESONIDE 500 MCG: 0.5 INHALANT RESPIRATORY (INHALATION) at 06:11

## 2019-07-17 RX ADMIN — BUDESONIDE 500 MCG: 0.5 INHALANT RESPIRATORY (INHALATION) at 18:04

## 2019-07-17 RX ADMIN — POLYETHYLENE GLYCOL 3350 17 G: 17 POWDER, FOR SOLUTION ORAL at 09:46

## 2019-07-17 RX ADMIN — ALPRAZOLAM 0.25 MG: 0.25 TABLET ORAL at 04:00

## 2019-07-17 RX ADMIN — FOLIC ACID 1 MG: 1 TABLET ORAL at 09:47

## 2019-07-17 RX ADMIN — IPRATROPIUM BROMIDE AND ALBUTEROL SULFATE 1 AMPULE: .5; 3 SOLUTION RESPIRATORY (INHALATION) at 18:04

## 2019-07-17 RX ADMIN — PANTOPRAZOLE SODIUM 40 MG: 40 TABLET, DELAYED RELEASE ORAL at 06:09

## 2019-07-17 RX ADMIN — GUAIFENESIN 600 MG: 600 TABLET, EXTENDED RELEASE ORAL at 21:13

## 2019-07-17 RX ADMIN — ASPIRIN 81 MG 81 MG: 81 TABLET ORAL at 09:47

## 2019-07-17 RX ADMIN — HYDROCODONE BITARTRATE AND ACETAMINOPHEN 1 TABLET: 5; 325 TABLET ORAL at 21:13

## 2019-07-17 ASSESSMENT — PAIN SCALES - GENERAL
PAINLEVEL_OUTOF10: 5
PAINLEVEL_OUTOF10: 0
PAINLEVEL_OUTOF10: 6
PAINLEVEL_OUTOF10: 6

## 2019-07-17 NOTE — PROGRESS NOTES
Physical Therapy   Subacute Daily Treatment Note  NAME: Irma Garcia  : 1945  MRN: [id number]    Date of Service: 2019    Patient Diagnosis(es):   Patient Active Problem List    Diagnosis Date Noted    Multifactorial functional impairment 2019    Impaired functional mobility, balance, gait, and endurance 2019    Malignant neoplasm of upper lobe of right lung ([address]     Pneumonia due to organism     Moderate malnutrition ([address] 2019    COPD with acute exacerbation ([address] 2019    Hypothyroidism due to medication 2019    Pulmonary fibrosis ([address]     Pulmonary embolism on right ([address] 2019    Anemia 2019    B12 deficiency 02/15/2019    Malignant neoplasm of middle lobe of right lung ([address] 2019    Secondary malignant neoplasm of left lung ([address] 2019    Adenocarcinoma of right lung ([address] 2019    Multiple lung nodules on CT     Tobacco abuse     COPD (chronic obstructive pulmonary disease) ([address] 2019    COPD exacerbation ([address] 2019       Past Medical History:   Diagnosis Date    COPD (chronic obstructive pulmonary disease) ([address]     Lung cancer ([address]      Past Surgical History:   Procedure Laterality Date    APPENDECTOMY      BRONCHOSCOPY N/A 2019    BRONCHOSCOPY performed by Keyla Coy MD at [address]         Restrictions  Restrictions/Precautions  Restrictions/Precautions: Femoral Block  Position Activity Restriction  Other position/activity restrictions: Monitor O2 sats  Subjective            Objective    Pt and 2 daughters present at patient's bedside for Interdisciplinary Care Conference this week. See separate note for full report. Pt to receive 3 in 1 commode and Rollator from [address] with <$30 copay per [facility].   Family will need to  equipment at [address] as they only deliver in [facility] on          Assessment        Pt limited by

## 2019-07-17 NOTE — PROGRESS NOTES
transfer-SBA with vc  Toileting-SBA with vc  Fine motor UB activity with BUE in sitting position to increase FM skills for ADLS-supervision      Pt. Needs extra time to complete tasks and ADLS  Plan   Plan  Times per week: 3-6x/wk  Times per day: Daily  Plan weeks: 2 wks  Current Treatment Recommendations: Strengthening, ROM, Balance Training, Functional Mobility Training, Endurance Training, Stair training, Pain Management, Safety Education & Training, Patient/Caregiver Education & Training, Self-Care / ADL, Home Management Training  G-Code     OutComes Score                                                  AM-PAC Score             Goals  Short term goals  Time Frame for Short term goals: 1 wk  Short term goal 1: Tolerate 25-30min BUE ther ex/act, while maintaining O2 sats 90% or above, to increase strength/end for ADL  Short term goal 2: Increase to SBA/Spv toileting  Short term goal 3: SBA/Spv LB dressing  Short term goal 4: Tolerate 2-4hr seated in chair/OOB time for increased act valerie/end  Long term goals  Time Frame for Long term goals : 2 wks  Long term goal 1: I/MI fxl ADL xfers  Long term goal 2: Increase to 5-7min stand valerie/end, while maintaining O2 sats 90% or above, to increase I with ADL  Long term goal 3: I/MI toileting  Long term goal 4: I/MI LB dressing and bathing  Long term goal 5: I BUE HEP  Patient Goals   Patient goals :  To return home when better       Therapy Time   Individual Concurrent Group Co-treatment   Time In  10:00am         Time Out  11:00am         Minutes  [address] Number: [id number]

## 2019-07-17 NOTE — PROGRESS NOTES
Objective    Bed Mobility: Mod I sup to sit   Transfers  Sit to Stand: Supervision;Stand by assistance  Stand to sit: Supervision;Stand by assistance  Comment: Using Rollator   Ambulation  Ambulation?: Yes  Ambulation 1  Surface: level tile  Device: Rollator  Other Apparatus: O2  Assistance: Stand by assistance  Quality of Gait: small steps, very slow byron, VC for brakes on rollator   Distance: 10', 14', 7'   Comments: p first trial SPO2 at 81%, p second SPO2 at 79 %, p 3rd SPO2 at 77% all on 5 L O2 takes approx 2 min for recovery to 90% with PLB. Stairs/Curb  Stairs?: No     Balance  Posture: Fair  Sitting - Static: Good  Sitting - Dynamic: Good  Standing - Static: Good  Standing - Dynamic: Good;-  Exercises  Hip Flexion: 2x10  Knee Long Arc Quad: 2x10  Ankle Pumps: x20  Comments: SPO2 levels maintained between 91-93%                        G-Code     OutComes Score                                                     AM-PAC Score             Goals  Short term goals  Time Frame for Short term goals: 1 week  Short term goal 1: Transfers and bed mobility with CS without cues for managing O2 tubing  Short term goal 2: amb >=40ft with pulse ox >90% post  Short term goal 3: tolerate x10 seated LE SHARON with rests and pulse ox >90%  Short term goal 4: asess 2 stairs with 2 rails and <= CGA of 1, check pulse ox post  Long term goals  Time Frame for Long term goals : 2 weeks  Long term goal 1: Independent bed mobility and transfers  Long term goal 2: amb >= 70ft with least AD with least AD maiantaing O2>90% and managing tubing independently  Long term goal 3: 2 stairs without rails with <= CGA of 1 person  Long term goal 4: increase LE stregntha nd endurance any to achieve fucntional goals  Long term goal 5: HEP in place for discharge. Patient Goals   Patient goals : \"I need to be stronger so I can get home. I will take any equipment you can get me, but I do not have any money. \"    Plan    Plan  Times per week: 5-7x

## 2019-07-18 LAB
ANION GAP SERPL CALCULATED.3IONS-SCNC: 12 MEQ/L (ref 9–15)
BASOPHILS ABSOLUTE: 0 K/UL (ref 0–0.2)
BASOPHILS RELATIVE PERCENT: 0 %
BUN BLDV-MCNC: 21 MG/DL (ref 8–23)
CALCIUM SERPL-MCNC: 9.3 MG/DL (ref 8.5–9.9)
CHLORIDE BLD-SCNC: 102 MEQ/L (ref 95–107)
CO2: 26 MEQ/L (ref 20–31)
CREAT SERPL-MCNC: 0.39 MG/DL (ref 0.5–0.9)
EOSINOPHILS ABSOLUTE: 0 K/UL (ref 0–0.7)
EOSINOPHILS RELATIVE PERCENT: 0.5 %
GFR AFRICAN AMERICAN: >60
GFR NON-AFRICAN AMERICAN: >60
GLUCOSE BLD-MCNC: 100 MG/DL (ref 70–99)
HCT VFR BLD CALC: 33.7 % (ref 37–47)
HEMOGLOBIN: 11.5 G/DL (ref 12–16)
LYMPHOCYTES ABSOLUTE: 0.6 K/UL (ref 1–4.8)
LYMPHOCYTES RELATIVE PERCENT: 7.6 %
MCH RBC QN AUTO: 32.9 PG (ref 27–31.3)
MCHC RBC AUTO-ENTMCNC: 34.1 % (ref 33–37)
MCV RBC AUTO: 96.5 FL (ref 82–100)
MONOCYTES ABSOLUTE: 0.3 K/UL (ref 0.2–0.8)
MONOCYTES RELATIVE PERCENT: 3.7 %
NEUTROPHILS ABSOLUTE: 7.2 K/UL (ref 1.4–6.5)
NEUTROPHILS RELATIVE PERCENT: 88.2 %
PDW BLD-RTO: 13.6 % (ref 11.5–14.5)
PLATELET # BLD: 309 K/UL (ref 130–400)
POTASSIUM SERPL-SCNC: 3.9 MEQ/L (ref 3.4–4.9)
RBC # BLD: 3.5 M/UL (ref 4.2–5.4)
SODIUM BLD-SCNC: 140 MEQ/L (ref 135–144)
WBC # BLD: 8.1 K/UL (ref 4.8–10.8)

## 2019-07-18 PROCEDURE — 6370000000 HC RX 637 (ALT 250 FOR IP): Performed by: INTERNAL MEDICINE

## 2019-07-18 PROCEDURE — 6360000002 HC RX W HCPCS: Performed by: INTERNAL MEDICINE

## 2019-07-18 PROCEDURE — 97535 SELF CARE MNGMENT TRAINING: CPT

## 2019-07-18 PROCEDURE — 2700000000 HC OXYGEN THERAPY PER DAY

## 2019-07-18 PROCEDURE — 94640 AIRWAY INHALATION TREATMENT: CPT

## 2019-07-18 PROCEDURE — 1200000002 HC SEMI PRIVATE SWING BED

## 2019-07-18 PROCEDURE — 36415 COLL VENOUS BLD VENIPUNCTURE: CPT

## 2019-07-18 PROCEDURE — 80048 BASIC METABOLIC PNL TOTAL CA: CPT

## 2019-07-18 PROCEDURE — 97110 THERAPEUTIC EXERCISES: CPT

## 2019-07-18 PROCEDURE — 97530 THERAPEUTIC ACTIVITIES: CPT

## 2019-07-18 PROCEDURE — 97116 GAIT TRAINING THERAPY: CPT

## 2019-07-18 PROCEDURE — 85025 COMPLETE CBC W/AUTO DIFF WBC: CPT

## 2019-07-18 RX ADMIN — ENOXAPARIN SODIUM 40 MG: 40 INJECTION SUBCUTANEOUS at 19:42

## 2019-07-18 RX ADMIN — FOLIC ACID 1 MG: 1 TABLET ORAL at 09:11

## 2019-07-18 RX ADMIN — MULTIPLE VITAMINS W/ MINERALS TAB 1 TABLET: TAB at 09:12

## 2019-07-18 RX ADMIN — IPRATROPIUM BROMIDE AND ALBUTEROL SULFATE 1 AMPULE: .5; 3 SOLUTION RESPIRATORY (INHALATION) at 17:59

## 2019-07-18 RX ADMIN — BUDESONIDE 500 MCG: 0.5 INHALANT RESPIRATORY (INHALATION) at 17:59

## 2019-07-18 RX ADMIN — AMLODIPINE BESYLATE 2.5 MG: 2.5 TABLET ORAL at 09:12

## 2019-07-18 RX ADMIN — POLYETHYLENE GLYCOL 3350 17 G: 17 POWDER, FOR SOLUTION ORAL at 09:11

## 2019-07-18 RX ADMIN — ALPRAZOLAM 0.25 MG: 0.25 TABLET ORAL at 22:01

## 2019-07-18 RX ADMIN — ASPIRIN 81 MG 81 MG: 81 TABLET ORAL at 09:12

## 2019-07-18 RX ADMIN — FLUTICASONE PROPIONATE 2 SPRAY: 50 SPRAY, METERED NASAL at 12:53

## 2019-07-18 RX ADMIN — HYDROCODONE BITARTRATE AND ACETAMINOPHEN 1 TABLET: 5; 325 TABLET ORAL at 04:42

## 2019-07-18 RX ADMIN — IPRATROPIUM BROMIDE AND ALBUTEROL SULFATE 1 AMPULE: .5; 3 SOLUTION RESPIRATORY (INHALATION) at 05:07

## 2019-07-18 RX ADMIN — ESCITALOPRAM OXALATE 5 MG: 10 TABLET ORAL at 09:11

## 2019-07-18 RX ADMIN — GUAIFENESIN 600 MG: 600 TABLET, EXTENDED RELEASE ORAL at 09:12

## 2019-07-18 RX ADMIN — PANTOPRAZOLE SODIUM 40 MG: 40 TABLET, DELAYED RELEASE ORAL at 04:43

## 2019-07-18 RX ADMIN — GUAIFENESIN 600 MG: 600 TABLET, EXTENDED RELEASE ORAL at 19:42

## 2019-07-18 RX ADMIN — HYDROCODONE BITARTRATE AND ACETAMINOPHEN 1 TABLET: 5; 325 TABLET ORAL at 22:01

## 2019-07-18 RX ADMIN — IPRATROPIUM BROMIDE AND ALBUTEROL SULFATE 1 AMPULE: .5; 3 SOLUTION RESPIRATORY (INHALATION) at 09:47

## 2019-07-18 RX ADMIN — HYDROCODONE BITARTRATE AND ACETAMINOPHEN 1 TABLET: 5; 325 TABLET ORAL at 12:51

## 2019-07-18 RX ADMIN — ALPRAZOLAM 0.25 MG: 0.25 TABLET ORAL at 04:42

## 2019-07-18 RX ADMIN — PREDNISONE 40 MG: 20 TABLET ORAL at 09:12

## 2019-07-18 RX ADMIN — BUDESONIDE 500 MCG: 0.5 INHALANT RESPIRATORY (INHALATION) at 05:07

## 2019-07-18 RX ADMIN — ALPRAZOLAM 0.25 MG: 0.25 TABLET ORAL at 12:51

## 2019-07-18 RX ADMIN — IPRATROPIUM BROMIDE AND ALBUTEROL SULFATE 1 AMPULE: .5; 3 SOLUTION RESPIRATORY (INHALATION) at 13:37

## 2019-07-18 ASSESSMENT — PAIN SCALES - GENERAL
PAINLEVEL_OUTOF10: 6
PAINLEVEL_OUTOF10: 0
PAINLEVEL_OUTOF10: 6
PAINLEVEL_OUTOF10: 6
PAINLEVEL_OUTOF10: 5
PAINLEVEL_OUTOF10: 5

## 2019-07-18 ASSESSMENT — PAIN DESCRIPTION - PROGRESSION
CLINICAL_PROGRESSION: NOT CHANGED

## 2019-07-18 NOTE — PROGRESS NOTES
Physical Therapy  Facility/Department: [facility] MED SURG UNIT  Daily Treatment Note  NAME: Iván Kennedy  : 1945  MRN: [id number]    Date of Service: 2019    Discharge Recommendations:  Home with assist PRN, Home with Home health PT        Assessment   Body structures, Functions, Activity limitations: Decreased functional mobility ; Decreased strength;Decreased endurance;Decreased balance  Assessment: Pt kept at 4L for all of PT session. Focused session on breathing during Ther Ex seated EOB with pt better able to keep count herself. Pt SPO2 maintained throughout EOB Ther Ex between 79-84% with RB's required 2nd to SOB. Pt ambulated to/from restroom begining at 82% SPO2 and dropping to 77% after  ambulation to restroom and to 64% after ambulating back to bed. Prognosis: Good  Patient Education: Pt educated to pinch shoulder blades in order to increase chest expansion for better breathing with good pt understanding and demo. REQUIRES PT FOLLOW UP: Yes  Activity Tolerance  Activity Tolerance: Patient Tolerated treatment well;Patient limited by endurance     Patient Diagnosis(es): There were no encounter diagnoses. has a past medical history of COPD (chronic obstructive pulmonary disease) ([facility] [address] and Lung cancer ([facility] [address] has a past surgical history that includes Hysterectomy; Appendectomy; Tonsillectomy; and bronchoscopy (N/A, 2019). Restrictions  Restrictions/Precautions  Restrictions/Precautions: Femoral Block  Position Activity Restriction  Other position/activity restrictions: Monitor O2 sats  Subjective   General  Chart Reviewed: Yes  Family / Caregiver Present: No  Referring Practitioner: Dr Antione Rubio: Pt sitting up in bed a fter lunch with daughter present at start of session. Pt states 6/10 chest pain and request nurse to bring meds before therapy.   General Comment  Comments: Pt at 83% SPO2 on 4L O2   Pain Screening  Patient Currently in Pain: Yes  Pain Assessment  Pain without cues for managing O2 tubing  Short term goal 2: amb >=40ft with pulse ox >90% post  Short term goal 3: tolerate x10 seated LE SHARON with rests and pulse ox >90%  Short term goal 4: asess 2 stairs with 2 rails and <= CGA of 1, check pulse ox post  Long term goals  Time Frame for Long term goals : 2 weeks  Long term goal 1: Independent bed mobility and transfers  Long term goal 2: amb >= 70ft with least AD with least AD maiantaing O2>90% and managing tubing independently  Long term goal 3: 2 stairs without rails with <= CGA of 1 person  Long term goal 4: increase LE stregntha nd endurance any to achieve fucntional goals  Long term goal 5: HEP in place for discharge. Patient Goals   Patient goals : \"I need to be stronger so I can get home. I will take any equipment you can get me, but I do not have any money. \"    Plan    Plan  Times per week: 5-7x week  Times per day: (1-2)  Plan weeks: 2  Current Treatment Recommendations: Strengthening, Gait Training, Stair training, Balance Training, Endurance Training, Functional Mobility Training, Transfer Training, Safety Education & Training, Home Exercise Program, Patient/Caregiver Education & Training  Safety Devices  Type of devices:  All fall risk precautions in place, Call light within reach, Gait belt, Left in bed     Therapy Time   Individual Concurrent Group Co-treatment   Time In  1240         Time Out  125         Minutes  [address], PTA  License and [address] Number: [id number]

## 2019-07-18 NOTE — PROGRESS NOTES
Occupational Therapy  Facility/Department: [facility] MED SURG UNIT  Daily Treatment Note  NAME: Irma Garcia  : 1945  MRN: [id number]    Date of Service: 2019    Discharge Recommendations:  Subacute/Skilled Nursing Facility       Assessment      REQUIRES OT FOLLOW UP: Yes       Pt. Is on 4 Liters of O2  Pt. Needs frequent rest breaks  Pt. Requires extra time to allow O2 to get higher than 88% with all ADL and ther ex/act  Pt. Is cooperative during OT session      Patient Diagnosis(es): There were no encounter diagnoses. has a past medical history of COPD (chronic obstructive pulmonary disease) ([facility] [address] and Lung cancer ([facility] [address] has a past surgical history that includes Hysterectomy; Appendectomy; Tonsillectomy; and bronchoscopy (N/A, 2019). Restrictions  Restrictions/Precautions  Restrictions/Precautions: Femoral Block  Position Activity Restriction  Other position/activity restrictions: Monitor O2 sats  Subjective   General  Chart Reviewed: Yes  Patient assessed for rehabilitation services? : (in subacute as of 19)  Additional Pertinent Hx: Was diagnosed with lung cancer at the beginning of this year  Family / Caregiver Present: No  Referring Practitioner: Dr. Mitch Grant   Diagnosis: COPD exacerbation, pneumonia  Subjective  Subjective: Pt is agreeable to OT and stated she has 2/10 pain in chest      Orientation     Objective    ADL  UE Dressing: Setup;Supervision kat PAYNE Dressing: Stand by assistance;Contact guard assistance donning underwear  Additional Comments: (with frequent rest breaks due to increased fatigue)   sit to stand-CGA/SBA with vc  Stand to sit-CGA/SBA with vc     Standing Balance  Time: 3 minutes with rollator 1 x , 2 minutes 1 x                       Type of ROM/Therapeutic Exercise  Type of ROM/Therapeutic Exercise: AROM  Exercises  Shoulder Flexion: 15  Shoulder Extension: 15  Shoulder ABduction: 15  Shoulder ADduction: 15  Horizontal ABduction: 10  Horizontal ADduction:

## 2019-07-19 PROCEDURE — 6370000000 HC RX 637 (ALT 250 FOR IP): Performed by: INTERNAL MEDICINE

## 2019-07-19 PROCEDURE — 97110 THERAPEUTIC EXERCISES: CPT

## 2019-07-19 PROCEDURE — 97116 GAIT TRAINING THERAPY: CPT

## 2019-07-19 PROCEDURE — 1200000002 HC SEMI PRIVATE SWING BED

## 2019-07-19 PROCEDURE — 97530 THERAPEUTIC ACTIVITIES: CPT

## 2019-07-19 PROCEDURE — 97535 SELF CARE MNGMENT TRAINING: CPT

## 2019-07-19 PROCEDURE — 6360000002 HC RX W HCPCS: Performed by: INTERNAL MEDICINE

## 2019-07-19 PROCEDURE — 94640 AIRWAY INHALATION TREATMENT: CPT

## 2019-07-19 PROCEDURE — 2700000000 HC OXYGEN THERAPY PER DAY

## 2019-07-19 RX ADMIN — HYDROCODONE BITARTRATE AND ACETAMINOPHEN 1 TABLET: 5; 325 TABLET ORAL at 16:27

## 2019-07-19 RX ADMIN — GUAIFENESIN 600 MG: 600 TABLET, EXTENDED RELEASE ORAL at 21:38

## 2019-07-19 RX ADMIN — ENOXAPARIN SODIUM 40 MG: 40 INJECTION SUBCUTANEOUS at 21:38

## 2019-07-19 RX ADMIN — ASPIRIN 81 MG 81 MG: 81 TABLET ORAL at 08:43

## 2019-07-19 RX ADMIN — HYDROCODONE BITARTRATE AND ACETAMINOPHEN 1 TABLET: 5; 325 TABLET ORAL at 09:05

## 2019-07-19 RX ADMIN — MULTIPLE VITAMINS W/ MINERALS TAB 1 TABLET: TAB at 08:43

## 2019-07-19 RX ADMIN — IPRATROPIUM BROMIDE AND ALBUTEROL SULFATE 1 AMPULE: .5; 3 SOLUTION RESPIRATORY (INHALATION) at 14:02

## 2019-07-19 RX ADMIN — PANTOPRAZOLE SODIUM 40 MG: 40 TABLET, DELAYED RELEASE ORAL at 05:52

## 2019-07-19 RX ADMIN — IPRATROPIUM BROMIDE AND ALBUTEROL SULFATE 1 AMPULE: .5; 3 SOLUTION RESPIRATORY (INHALATION) at 18:03

## 2019-07-19 RX ADMIN — ESCITALOPRAM OXALATE 5 MG: 10 TABLET ORAL at 08:43

## 2019-07-19 RX ADMIN — GUAIFENESIN 600 MG: 600 TABLET, EXTENDED RELEASE ORAL at 08:43

## 2019-07-19 RX ADMIN — BUDESONIDE 500 MCG: 0.5 INHALANT RESPIRATORY (INHALATION) at 06:15

## 2019-07-19 RX ADMIN — IPRATROPIUM BROMIDE AND ALBUTEROL SULFATE 1 AMPULE: .5; 3 SOLUTION RESPIRATORY (INHALATION) at 06:15

## 2019-07-19 RX ADMIN — AMLODIPINE BESYLATE 2.5 MG: 2.5 TABLET ORAL at 08:43

## 2019-07-19 RX ADMIN — ALPRAZOLAM 0.25 MG: 0.25 TABLET ORAL at 09:05

## 2019-07-19 RX ADMIN — ALPRAZOLAM 0.25 MG: 0.25 TABLET ORAL at 16:27

## 2019-07-19 RX ADMIN — FOLIC ACID 1 MG: 1 TABLET ORAL at 08:43

## 2019-07-19 RX ADMIN — IPRATROPIUM BROMIDE AND ALBUTEROL SULFATE 1 AMPULE: .5; 3 SOLUTION RESPIRATORY (INHALATION) at 10:01

## 2019-07-19 RX ADMIN — BUDESONIDE 500 MCG: 0.5 INHALANT RESPIRATORY (INHALATION) at 18:03

## 2019-07-19 RX ADMIN — FLUTICASONE PROPIONATE 2 SPRAY: 50 SPRAY, METERED NASAL at 14:43

## 2019-07-19 RX ADMIN — PREDNISONE 40 MG: 20 TABLET ORAL at 08:43

## 2019-07-19 ASSESSMENT — PAIN DESCRIPTION - DESCRIPTORS: DESCRIPTORS: ACHING;SORE

## 2019-07-19 ASSESSMENT — PAIN SCALES - GENERAL
PAINLEVEL_OUTOF10: 5

## 2019-07-19 ASSESSMENT — PAIN DESCRIPTION - PROGRESSION
CLINICAL_PROGRESSION: NOT CHANGED

## 2019-07-19 ASSESSMENT — PAIN DESCRIPTION - LOCATION
LOCATION: GENERALIZED
LOCATION: CHEST

## 2019-07-19 ASSESSMENT — PAIN DESCRIPTION - FREQUENCY: FREQUENCY: INTERMITTENT

## 2019-07-19 ASSESSMENT — PAIN - FUNCTIONAL ASSESSMENT: PAIN_FUNCTIONAL_ASSESSMENT: ACTIVITIES ARE NOT PREVENTED

## 2019-07-19 ASSESSMENT — PAIN DESCRIPTION - PAIN TYPE: TYPE: CHRONIC PAIN

## 2019-07-19 ASSESSMENT — PAIN DESCRIPTION - ONSET: ONSET: ON-GOING

## 2019-07-19 NOTE — PLAN OF CARE
Problem: Activity Intolerance:  Goal: Ability to tolerate increased activity will improve  Description  Ability to tolerate increased activity will improve  Outcome: Ongoing     Problem: Airway Clearance - Ineffective:  Goal: Ability to maintain a clear airway will improve  Description  Ability to maintain a clear airway will improve  Outcome: Ongoing     Problem: Breathing Pattern - Ineffective:  Goal: Ability to achieve and maintain a regular respiratory rate will improve  Description  Ability to achieve and maintain a regular respiratory rate will improve  Outcome: Ongoing     Problem: Gas Exchange - Impaired:  Goal: Levels of oxygenation will improve  Description  Levels of oxygenation will improve  Outcome: Ongoing     Problem: Falls - Risk of:  Goal: Absence of physical injury  Description  Absence of physical injury  Outcome: Ongoing     Problem: Pain:  Description  Pain management should include both nonpharmacologic and pharmacologic interventions. Goal: Pain level will decrease  Description  Pain level will decrease  Outcome: Ongoing  Goal: Control of acute pain  Description  Control of acute pain  Outcome: Ongoing  Goal: Control of chronic pain  Description  Control of chronic pain  Outcome: Ongoing     Problem: Nutrition  Goal: Optimal nutrition therapy  Description  Nutrition Problem: Unintended weight loss  Intervention: Food and/or Nutrient Delivery: Continue current diet, Continue current ONS  Nutritional Goals: Intake > 75% meals & ONS. Slow weight gain to UBW range.    Outcome: Ongoing
gain to UBW range.    Outcome: Met This Shift

## 2019-07-19 NOTE — PROGRESS NOTES
VC for brakes on rollator. Focusing on PLB   Distance: functional distance from bed>toilet x 3' and 60' x 2 with seated RB's between gait trials  Comments: O2 level dec to 68% with 5 min recovery with both gait trials to 90%                                  G-Code     OutComes Score                                                     AM-PAC Score             Goals  Short term goals  Time Frame for Short term goals: 1 week  Short term goal 1: Transfers and bed mobility with CS without cues for managing O2 tubing  Short term goal 2: amb >=40ft with pulse ox >90% post  Short term goal 3: tolerate x10 seated LE SHARON with rests and pulse ox >90%  Short term goal 4: asess 2 stairs with 2 rails and <= CGA of 1, check pulse ox post  Long term goals  Time Frame for Long term goals : 2 weeks  Long term goal 1: Independent bed mobility and transfers  Long term goal 2: amb >= 70ft with least AD with least AD maiantaing O2>90% and managing tubing independently  Long term goal 3: 2 stairs without rails with <= CGA of 1 person  Long term goal 4: increase LE stregntha nd endurance any to achieve fucntional goals  Long term goal 5: HEP in place for discharge. Patient Goals   Patient goals : \"I need to be stronger so I can get home. I will take any equipment you can get me, but I do not have any money. \"    Plan    Plan  Times per week: 5-7x week  Times per day: (1-2)  Plan weeks: 2  Current Treatment Recommendations: Strengthening, Gait Training, Stair training, Balance Training, Endurance Training, Functional Mobility Training, Transfer Training, Safety Education & Training, Home Exercise Program, Patient/Caregiver Education & Training  Safety Devices  Type of devices:  All fall risk precautions in place, Call light within reach, Gait belt, Left in bed     Therapy Time   Individual Concurrent Group Co-treatment   Time In        1315   Time Out        1400   Minutes        [address], PTA  License and Documentation

## 2019-07-19 NOTE — PROGRESS NOTES
Chart reviewed. Patient's care discussed in daily quality rounds. Patient was reported to have been tearful and reporting an increase in anxiety. This  met with patient to follow up. Upon visit patient is alert and laying in hospital bed with head elevated while wearing eye glasses and oxygen. Patient reports, \" It was just a bad morning. \" It is worth noting that patient recently lost significant other and is grieving. Patient reports that daughters came to visit and that made patient feel better. Patient also reported that anti-anxiety medication also assisted with mood. Patient reports feeling better and thanks this  for checking in. This  reviewed extra support at [facility] however patient reports no interest in meeting with psychologist at this time.   SS to continue to follow

## 2019-07-19 NOTE — PROGRESS NOTES
Flexion: 15  Wrist Flexion: 15  Wrist Extension: 15   to increase and maintain UB strength                  Plan   Plan  Times per week: 3-6x/wk  Times per day: Daily  Plan weeks: 2 wks  Current Treatment Recommendations: Strengthening, ROM, Balance Training, Functional Mobility Training, Endurance Training, Stair training, Pain Management, Safety Education & Training, Patient/Caregiver Education & Training, Self-Care / ADL, Home Management Training  G-Code     OutComes Score                                                  AM-PAC Score             Goals  Short term goals  Time Frame for Short term goals: 1 wk  Short term goal 1: Tolerate 25-30min BUE ther ex/act, while maintaining O2 sats 90% or above, to increase strength/end for ADL  Short term goal 2: Increase to SBA/Spv toileting  Short term goal 3: SBA/Spv LB dressing  Short term goal 4: Tolerate 2-4hr seated in chair/OOB time for increased act valerie/end  Long term goals  Time Frame for Long term goals : 2 wks  Long term goal 1: I/MI fxl ADL xfers  Long term goal 2: Increase to 5-7min stand valerie/end, while maintaining O2 sats 90% or above, to increase I with ADL  Long term goal 3: I/MI toileting  Long term goal 4: I/MI LB dressing and bathing  Long term goal 5: I BUE HEP  Patient Goals   Patient goals :  To return home when better       Therapy Time   Individual Concurrent Group Co-treatment   Time In  1:30pm         Time Out  2:30pm         Minutes  [address] [facility]  Number: [phone]

## 2019-07-20 ENCOUNTER — APPOINTMENT (OUTPATIENT)
Dept: GENERAL RADIOLOGY | Age: 74
DRG: 190 | End: 2019-07-20
Attending: INTERNAL MEDICINE
Payer: MEDICARE

## 2019-07-20 PROCEDURE — 6370000000 HC RX 637 (ALT 250 FOR IP): Performed by: INTERNAL MEDICINE

## 2019-07-20 PROCEDURE — 97110 THERAPEUTIC EXERCISES: CPT

## 2019-07-20 PROCEDURE — 71046 X-RAY EXAM CHEST 2 VIEWS: CPT

## 2019-07-20 PROCEDURE — 97116 GAIT TRAINING THERAPY: CPT

## 2019-07-20 PROCEDURE — 1200000002 HC SEMI PRIVATE SWING BED

## 2019-07-20 PROCEDURE — 97530 THERAPEUTIC ACTIVITIES: CPT

## 2019-07-20 PROCEDURE — 94640 AIRWAY INHALATION TREATMENT: CPT

## 2019-07-20 PROCEDURE — 6360000002 HC RX W HCPCS: Performed by: INTERNAL MEDICINE

## 2019-07-20 RX ORDER — PREDNISONE 20 MG/1
20 TABLET ORAL DAILY
Status: DISCONTINUED | OUTPATIENT
Start: 2019-07-20 | End: 2019-07-21

## 2019-07-20 RX ADMIN — MULTIPLE VITAMINS W/ MINERALS TAB 1 TABLET: TAB at 08:32

## 2019-07-20 RX ADMIN — POLYETHYLENE GLYCOL 3350 17 G: 17 POWDER, FOR SOLUTION ORAL at 08:31

## 2019-07-20 RX ADMIN — BUDESONIDE 500 MCG: 0.5 INHALANT RESPIRATORY (INHALATION) at 06:20

## 2019-07-20 RX ADMIN — FOLIC ACID 1 MG: 1 TABLET ORAL at 08:32

## 2019-07-20 RX ADMIN — ALPRAZOLAM 0.25 MG: 0.25 TABLET ORAL at 01:41

## 2019-07-20 RX ADMIN — AMLODIPINE BESYLATE 2.5 MG: 2.5 TABLET ORAL at 08:31

## 2019-07-20 RX ADMIN — IPRATROPIUM BROMIDE AND ALBUTEROL SULFATE 1 AMPULE: .5; 3 SOLUTION RESPIRATORY (INHALATION) at 06:21

## 2019-07-20 RX ADMIN — HYDROCODONE BITARTRATE AND ACETAMINOPHEN 1 TABLET: 5; 325 TABLET ORAL at 08:32

## 2019-07-20 RX ADMIN — HYDROCODONE BITARTRATE AND ACETAMINOPHEN 1 TABLET: 5; 325 TABLET ORAL at 14:50

## 2019-07-20 RX ADMIN — HYDROCODONE BITARTRATE AND ACETAMINOPHEN 1 TABLET: 5; 325 TABLET ORAL at 21:50

## 2019-07-20 RX ADMIN — PANTOPRAZOLE SODIUM 40 MG: 40 TABLET, DELAYED RELEASE ORAL at 06:44

## 2019-07-20 RX ADMIN — IPRATROPIUM BROMIDE AND ALBUTEROL SULFATE 1 AMPULE: .5; 3 SOLUTION RESPIRATORY (INHALATION) at 18:08

## 2019-07-20 RX ADMIN — GUAIFENESIN 600 MG: 600 TABLET, EXTENDED RELEASE ORAL at 21:50

## 2019-07-20 RX ADMIN — ESCITALOPRAM OXALATE 5 MG: 10 TABLET ORAL at 08:32

## 2019-07-20 RX ADMIN — FLUTICASONE PROPIONATE 2 SPRAY: 50 SPRAY, METERED NASAL at 14:50

## 2019-07-20 RX ADMIN — ENOXAPARIN SODIUM 40 MG: 40 INJECTION SUBCUTANEOUS at 21:49

## 2019-07-20 RX ADMIN — ASPIRIN 81 MG 81 MG: 81 TABLET ORAL at 08:31

## 2019-07-20 RX ADMIN — ALPRAZOLAM 0.25 MG: 0.25 TABLET ORAL at 21:50

## 2019-07-20 RX ADMIN — HYDROCODONE BITARTRATE AND ACETAMINOPHEN 1 TABLET: 5; 325 TABLET ORAL at 01:41

## 2019-07-20 RX ADMIN — ALPRAZOLAM 0.25 MG: 0.25 TABLET ORAL at 14:50

## 2019-07-20 RX ADMIN — IPRATROPIUM BROMIDE AND ALBUTEROL SULFATE 1 AMPULE: .5; 3 SOLUTION RESPIRATORY (INHALATION) at 09:45

## 2019-07-20 RX ADMIN — PREDNISONE 20 MG: 20 TABLET ORAL at 08:36

## 2019-07-20 RX ADMIN — ALPRAZOLAM 0.25 MG: 0.25 TABLET ORAL at 08:31

## 2019-07-20 RX ADMIN — IPRATROPIUM BROMIDE AND ALBUTEROL SULFATE 1 AMPULE: .5; 3 SOLUTION RESPIRATORY (INHALATION) at 13:55

## 2019-07-20 RX ADMIN — BUDESONIDE 500 MCG: 0.5 INHALANT RESPIRATORY (INHALATION) at 18:08

## 2019-07-20 RX ADMIN — GUAIFENESIN 600 MG: 600 TABLET, EXTENDED RELEASE ORAL at 08:31

## 2019-07-20 ASSESSMENT — PAIN DESCRIPTION - FREQUENCY
FREQUENCY: INTERMITTENT
FREQUENCY: INTERMITTENT

## 2019-07-20 ASSESSMENT — PAIN DESCRIPTION - LOCATION
LOCATION: GENERALIZED
LOCATION: CHEST

## 2019-07-20 ASSESSMENT — PAIN SCALES - GENERAL
PAINLEVEL_OUTOF10: 4
PAINLEVEL_OUTOF10: 6
PAINLEVEL_OUTOF10: 0
PAINLEVEL_OUTOF10: 5
PAINLEVEL_OUTOF10: 6
PAINLEVEL_OUTOF10: 0
PAINLEVEL_OUTOF10: 6

## 2019-07-20 ASSESSMENT — PAIN DESCRIPTION - DESCRIPTORS
DESCRIPTORS: ACHING;SORE
DESCRIPTORS: ACHING;SORE

## 2019-07-20 ASSESSMENT — PAIN DESCRIPTION - PAIN TYPE
TYPE: CHRONIC PAIN
TYPE: CHRONIC PAIN

## 2019-07-20 ASSESSMENT — PAIN DESCRIPTION - ONSET: ONSET: ON-GOING

## 2019-07-20 ASSESSMENT — PAIN DESCRIPTION - PROGRESSION
CLINICAL_PROGRESSION: GRADUALLY WORSENING
CLINICAL_PROGRESSION: NOT CHANGED

## 2019-07-20 NOTE — PROGRESS NOTES
any focal sensory/motor deficits. Cranial nerves: II-XII intact, grossly non-focal.  Psychiatric: Alert and oriented, thought content appropriate, normal insight  Capillary Refill: Brisk,< 3 seconds   Peripheral Pulses: +2 palpable, equal bilaterally       Labs:   Recent Labs     07/18/19  0612   WBC 8.1   HGB 11.5*   HCT 33.7*        Recent Labs     07/18/19  0611      K 3.9      CO2 26   BUN 21   CREATININE 0.39*   CALCIUM 9.3     No results for input(s): AST, ALT, BILIDIR, BILITOT, ALKPHOS in the last 72 hours. No results for input(s): INR in the last 72 hours. No results for input(s): Gar Dickens in the last 72 hours.     Urinalysis:      Lab Results   Component Value Date    NITRU Negative 07/02/2019    WBCUA 3-5 01/01/2019    BACTERIA Few 01/01/2019    RBCUA 0-2 01/01/2019    BLOODU Negative 07/02/2019    SPECGRAV 1.013 07/02/2019    GLUCOSEU Negative 07/02/2019       Radiology:  No orders to display           Assessment/Plan:    Active Hospital Problems    Diagnosis Date Noted    Multifactorial functional impairment [R53.81] 07/11/2019         DVT Prophylaxis: lovenox  Diet: DIET GENERAL;  Dietary Nutrition Supplements: Clear Liquid Oral Supplement  Dietary Nutrition Supplements: Standard High Calorie Oral Supplement  Code Status: Full Code    PT/OT Eval Status: done    Dispo - COPD exacerbation- PO steroids- decrease to 20 mg today   Acute/chronic respiratory failure, hypercapnia/hypooxygenation- O2  HTN controlled  Lung CA- follow with oncology after DC   Medically stable for skilled status at [facility]       Electronically signed by Nick Martin MD on 7/20/2019 at 8:15 AM

## 2019-07-21 PROCEDURE — 1200000002 HC SEMI PRIVATE SWING BED

## 2019-07-21 PROCEDURE — 6370000000 HC RX 637 (ALT 250 FOR IP): Performed by: INTERNAL MEDICINE

## 2019-07-21 PROCEDURE — 94002 VENT MGMT INPAT INIT DAY: CPT

## 2019-07-21 PROCEDURE — 94660 CPAP INITIATION&MGMT: CPT

## 2019-07-21 PROCEDURE — 6360000002 HC RX W HCPCS: Performed by: INTERNAL MEDICINE

## 2019-07-21 PROCEDURE — 94640 AIRWAY INHALATION TREATMENT: CPT

## 2019-07-21 PROCEDURE — 2700000000 HC OXYGEN THERAPY PER DAY

## 2019-07-21 RX ORDER — METHYLPREDNISOLONE SODIUM SUCCINATE 125 MG/2ML
60 INJECTION, POWDER, LYOPHILIZED, FOR SOLUTION INTRAMUSCULAR; INTRAVENOUS DAILY
Status: DISCONTINUED | OUTPATIENT
Start: 2019-07-21 | End: 2019-07-22

## 2019-07-21 RX ADMIN — GUAIFENESIN 600 MG: 600 TABLET, EXTENDED RELEASE ORAL at 20:39

## 2019-07-21 RX ADMIN — ESCITALOPRAM OXALATE 5 MG: 10 TABLET ORAL at 08:31

## 2019-07-21 RX ADMIN — IPRATROPIUM BROMIDE AND ALBUTEROL SULFATE 1 AMPULE: .5; 3 SOLUTION RESPIRATORY (INHALATION) at 09:41

## 2019-07-21 RX ADMIN — FOLIC ACID 1 MG: 1 TABLET ORAL at 08:32

## 2019-07-21 RX ADMIN — AMLODIPINE BESYLATE 2.5 MG: 2.5 TABLET ORAL at 08:32

## 2019-07-21 RX ADMIN — METHYLPREDNISOLONE SODIUM SUCCINATE 60 MG: 125 INJECTION, POWDER, FOR SOLUTION INTRAMUSCULAR; INTRAVENOUS at 08:58

## 2019-07-21 RX ADMIN — ASPIRIN 81 MG 81 MG: 81 TABLET ORAL at 08:32

## 2019-07-21 RX ADMIN — HYDROCODONE BITARTRATE AND ACETAMINOPHEN 1 TABLET: 5; 325 TABLET ORAL at 16:19

## 2019-07-21 RX ADMIN — IPRATROPIUM BROMIDE AND ALBUTEROL SULFATE 1 AMPULE: .5; 3 SOLUTION RESPIRATORY (INHALATION) at 13:36

## 2019-07-21 RX ADMIN — ENOXAPARIN SODIUM 40 MG: 40 INJECTION SUBCUTANEOUS at 20:39

## 2019-07-21 RX ADMIN — BUDESONIDE 500 MCG: 0.5 INHALANT RESPIRATORY (INHALATION) at 04:54

## 2019-07-21 RX ADMIN — MULTIPLE VITAMINS W/ MINERALS TAB 1 TABLET: TAB at 08:32

## 2019-07-21 RX ADMIN — GUAIFENESIN 600 MG: 600 TABLET, EXTENDED RELEASE ORAL at 08:32

## 2019-07-21 RX ADMIN — HYDROCODONE BITARTRATE AND ACETAMINOPHEN 1 TABLET: 5; 325 TABLET ORAL at 08:31

## 2019-07-21 RX ADMIN — BUDESONIDE 500 MCG: 0.5 INHALANT RESPIRATORY (INHALATION) at 18:03

## 2019-07-21 RX ADMIN — PANTOPRAZOLE SODIUM 40 MG: 40 TABLET, DELAYED RELEASE ORAL at 05:11

## 2019-07-21 RX ADMIN — IPRATROPIUM BROMIDE AND ALBUTEROL SULFATE 1 AMPULE: .5; 3 SOLUTION RESPIRATORY (INHALATION) at 18:03

## 2019-07-21 RX ADMIN — ALPRAZOLAM 0.25 MG: 0.25 TABLET ORAL at 08:32

## 2019-07-21 RX ADMIN — FLUTICASONE PROPIONATE 2 SPRAY: 50 SPRAY, METERED NASAL at 16:15

## 2019-07-21 RX ADMIN — ALPRAZOLAM 0.25 MG: 0.25 TABLET ORAL at 16:19

## 2019-07-21 RX ADMIN — IPRATROPIUM BROMIDE AND ALBUTEROL SULFATE 1 AMPULE: .5; 3 SOLUTION RESPIRATORY (INHALATION) at 04:53

## 2019-07-21 ASSESSMENT — PAIN SCALES - GENERAL
PAINLEVEL_OUTOF10: 5
PAINLEVEL_OUTOF10: 6

## 2019-07-21 NOTE — PROGRESS NOTES
LPN into assist patient to bathroom. Patient back to bed breathing heavy. LPN instructed to breath slow in through the mouth out through the nose. Patient pulsox 56. LPN repoted to Respiratory. Respiratory instructed LPN to turn oxegen up to 5. Reported situation  to RN.

## 2019-07-21 NOTE — PROGRESS NOTES
1222--Call from [employer] with Hospice referral request per Dr. Talia Alarcon. Per MAXWELL Gross pt would like meeting scheduled with dtr Janes Kline at [phone].  1244--Attempt to contact [facility]; no answer; message left requesting return call. 1333--Call to [employer] to inquire if family might be at bedside; per [address] family has recently left. 1334--Call to dtr Lynda Landrum; offer Hospice referral meeting today; states tomorrow would be better. Scheduled for Mon 7/22/19 at 1300. Family asks to meet at Nursing desk at that time. 1344--Update MAXWELL Gross.

## 2019-07-22 LAB
ANION GAP SERPL CALCULATED.3IONS-SCNC: 12 MEQ/L (ref 9–15)
BASOPHILS ABSOLUTE: 0 K/UL (ref 0–0.2)
BASOPHILS RELATIVE PERCENT: 0 %
BUN BLDV-MCNC: 20 MG/DL (ref 8–23)
CALCIUM SERPL-MCNC: 9.3 MG/DL (ref 8.5–9.9)
CHLORIDE BLD-SCNC: 102 MEQ/L (ref 95–107)
CO2: 24 MEQ/L (ref 20–31)
CREAT SERPL-MCNC: 0.39 MG/DL (ref 0.5–0.9)
EOSINOPHILS ABSOLUTE: 0 K/UL (ref 0–0.7)
EOSINOPHILS RELATIVE PERCENT: 0.1 %
GFR AFRICAN AMERICAN: >60
GFR NON-AFRICAN AMERICAN: >60
GLUCOSE BLD-MCNC: 127 MG/DL (ref 70–99)
HCT VFR BLD CALC: 33.3 % (ref 37–47)
HEMOGLOBIN: 11.3 G/DL (ref 12–16)
LYMPHOCYTES ABSOLUTE: 0.5 K/UL (ref 1–4.8)
LYMPHOCYTES RELATIVE PERCENT: 4.7 %
MCH RBC QN AUTO: 32.7 PG (ref 27–31.3)
MCHC RBC AUTO-ENTMCNC: 33.9 % (ref 33–37)
MCV RBC AUTO: 96.6 FL (ref 82–100)
MONOCYTES ABSOLUTE: 0.3 K/UL (ref 0.2–0.8)
MONOCYTES RELATIVE PERCENT: 3.3 %
NEUTROPHILS ABSOLUTE: 8.8 K/UL (ref 1.4–6.5)
NEUTROPHILS RELATIVE PERCENT: 91.9 %
PDW BLD-RTO: 13.5 % (ref 11.5–14.5)
PLATELET # BLD: 296 K/UL (ref 130–400)
POTASSIUM SERPL-SCNC: 3.8 MEQ/L (ref 3.4–4.9)
RBC # BLD: 3.45 M/UL (ref 4.2–5.4)
SODIUM BLD-SCNC: 138 MEQ/L (ref 135–144)
WBC # BLD: 9.6 K/UL (ref 4.8–10.8)

## 2019-07-22 PROCEDURE — 2700000000 HC OXYGEN THERAPY PER DAY

## 2019-07-22 PROCEDURE — 97110 THERAPEUTIC EXERCISES: CPT

## 2019-07-22 PROCEDURE — 6360000002 HC RX W HCPCS: Performed by: INTERNAL MEDICINE

## 2019-07-22 PROCEDURE — 85025 COMPLETE CBC W/AUTO DIFF WBC: CPT

## 2019-07-22 PROCEDURE — 36415 COLL VENOUS BLD VENIPUNCTURE: CPT

## 2019-07-22 PROCEDURE — 94660 CPAP INITIATION&MGMT: CPT

## 2019-07-22 PROCEDURE — 6370000000 HC RX 637 (ALT 250 FOR IP): Performed by: INTERNAL MEDICINE

## 2019-07-22 PROCEDURE — 94640 AIRWAY INHALATION TREATMENT: CPT

## 2019-07-22 PROCEDURE — 97530 THERAPEUTIC ACTIVITIES: CPT

## 2019-07-22 PROCEDURE — 80048 BASIC METABOLIC PNL TOTAL CA: CPT

## 2019-07-22 PROCEDURE — 97116 GAIT TRAINING THERAPY: CPT

## 2019-07-22 PROCEDURE — 1200000002 HC SEMI PRIVATE SWING BED

## 2019-07-22 PROCEDURE — 97535 SELF CARE MNGMENT TRAINING: CPT

## 2019-07-22 RX ORDER — PREDNISONE 20 MG/1
40 TABLET ORAL DAILY
Status: DISCONTINUED | OUTPATIENT
Start: 2019-07-22 | End: 2019-07-23 | Stop reason: HOSPADM

## 2019-07-22 RX ADMIN — BUDESONIDE 500 MCG: 0.5 INHALANT RESPIRATORY (INHALATION) at 18:10

## 2019-07-22 RX ADMIN — IPRATROPIUM BROMIDE AND ALBUTEROL SULFATE 1 AMPULE: .5; 3 SOLUTION RESPIRATORY (INHALATION) at 14:00

## 2019-07-22 RX ADMIN — PANTOPRAZOLE SODIUM 40 MG: 40 TABLET, DELAYED RELEASE ORAL at 06:05

## 2019-07-22 RX ADMIN — PREDNISONE 40 MG: 20 TABLET ORAL at 10:29

## 2019-07-22 RX ADMIN — ALPRAZOLAM 0.25 MG: 0.25 TABLET ORAL at 20:17

## 2019-07-22 RX ADMIN — ALPRAZOLAM 0.25 MG: 0.25 TABLET ORAL at 00:34

## 2019-07-22 RX ADMIN — POLYETHYLENE GLYCOL 3350 17 G: 17 POWDER, FOR SOLUTION ORAL at 08:51

## 2019-07-22 RX ADMIN — FLUTICASONE PROPIONATE 2 SPRAY: 50 SPRAY, METERED NASAL at 14:00

## 2019-07-22 RX ADMIN — ESCITALOPRAM OXALATE 5 MG: 10 TABLET ORAL at 08:41

## 2019-07-22 RX ADMIN — AMLODIPINE BESYLATE 2.5 MG: 2.5 TABLET ORAL at 08:42

## 2019-07-22 RX ADMIN — ENOXAPARIN SODIUM 40 MG: 40 INJECTION SUBCUTANEOUS at 20:16

## 2019-07-22 RX ADMIN — IPRATROPIUM BROMIDE AND ALBUTEROL SULFATE 1 AMPULE: .5; 3 SOLUTION RESPIRATORY (INHALATION) at 09:52

## 2019-07-22 RX ADMIN — ALPRAZOLAM 0.25 MG: 0.25 TABLET ORAL at 09:48

## 2019-07-22 RX ADMIN — BUDESONIDE 500 MCG: 0.5 INHALANT RESPIRATORY (INHALATION) at 05:46

## 2019-07-22 RX ADMIN — MULTIPLE VITAMINS W/ MINERALS TAB 1 TABLET: TAB at 08:41

## 2019-07-22 RX ADMIN — IPRATROPIUM BROMIDE AND ALBUTEROL SULFATE 1 AMPULE: .5; 3 SOLUTION RESPIRATORY (INHALATION) at 18:10

## 2019-07-22 RX ADMIN — FOLIC ACID 1 MG: 1 TABLET ORAL at 08:42

## 2019-07-22 RX ADMIN — HYDROCODONE BITARTRATE AND ACETAMINOPHEN 1 TABLET: 5; 325 TABLET ORAL at 20:17

## 2019-07-22 RX ADMIN — IPRATROPIUM BROMIDE AND ALBUTEROL SULFATE 1 AMPULE: .5; 3 SOLUTION RESPIRATORY (INHALATION) at 05:46

## 2019-07-22 RX ADMIN — GUAIFENESIN 600 MG: 600 TABLET, EXTENDED RELEASE ORAL at 20:16

## 2019-07-22 RX ADMIN — HYDROCODONE BITARTRATE AND ACETAMINOPHEN 1 TABLET: 5; 325 TABLET ORAL at 00:34

## 2019-07-22 RX ADMIN — ASPIRIN 81 MG 81 MG: 81 TABLET ORAL at 08:41

## 2019-07-22 RX ADMIN — GUAIFENESIN 600 MG: 600 TABLET, EXTENDED RELEASE ORAL at 08:41

## 2019-07-22 RX ADMIN — HYDROCODONE BITARTRATE AND ACETAMINOPHEN 1 TABLET: 5; 325 TABLET ORAL at 08:50

## 2019-07-22 ASSESSMENT — PAIN DESCRIPTION - ORIENTATION
ORIENTATION: MID;LOWER

## 2019-07-22 ASSESSMENT — PAIN SCALES - GENERAL
PAINLEVEL_OUTOF10: 6
PAINLEVEL_OUTOF10: 5
PAINLEVEL_OUTOF10: 4
PAINLEVEL_OUTOF10: 6
PAINLEVEL_OUTOF10: 2
PAINLEVEL_OUTOF10: 5

## 2019-07-22 ASSESSMENT — PAIN DESCRIPTION - FREQUENCY
FREQUENCY: CONTINUOUS

## 2019-07-22 ASSESSMENT — PAIN DESCRIPTION - PROGRESSION
CLINICAL_PROGRESSION: GRADUALLY IMPROVING
CLINICAL_PROGRESSION: GRADUALLY WORSENING
CLINICAL_PROGRESSION: GRADUALLY IMPROVING
CLINICAL_PROGRESSION: GRADUALLY WORSENING
CLINICAL_PROGRESSION: NOT CHANGED

## 2019-07-22 ASSESSMENT — PAIN DESCRIPTION - ONSET
ONSET: ON-GOING

## 2019-07-22 ASSESSMENT — PAIN DESCRIPTION - LOCATION
LOCATION: BACK

## 2019-07-22 ASSESSMENT — PAIN DESCRIPTION - DESCRIPTORS
DESCRIPTORS: ACHING;SORE

## 2019-07-22 ASSESSMENT — PAIN DESCRIPTION - PAIN TYPE
TYPE: CHRONIC PAIN

## 2019-07-22 ASSESSMENT — PAIN - FUNCTIONAL ASSESSMENT
PAIN_FUNCTIONAL_ASSESSMENT: ACTIVITIES ARE NOT PREVENTED

## 2019-07-22 NOTE — PROGRESS NOTES
Hospitalist Progress Note      PCP: DERECK Cano    Date of Admission: 7/8/2019    Chief Complaint: dyspnea    Subjective: pt awake/alert     Medications:  Reviewed    Infusion Medications   Scheduled Medications    methylPREDNISolone  60 mg Intravenous Daily    escitalopram  5 mg Oral Daily    enoxaparin  40 mg Subcutaneous Daily    ipratropium-albuterol  1 ampule Inhalation 4x daily    aspirin  81 mg Oral Daily    budesonide  0.5 mg Nebulization BID    fluticasone  2 spray Each Nostril Daily    folic acid  1 mg Oral Daily    guaiFENesin  600 mg Oral BID    pantoprazole  40 mg Oral Daily    polyethylene glycol  17 g Oral Daily    therapeutic multivitamin-minerals  1 tablet Oral Daily    amLODIPine  2.5 mg Oral Daily     PRN Meds: ALPRAZolam, acetaminophen, magnesium hydroxide, ondansetron, albuterol, HYDROcodone-acetaminophen, sodium chloride      Intake/Output Summary (Last 24 hours) at 7/22/2019 0800  Last data filed at 7/22/2019 0536  Gross per 24 hour   Intake 1440 ml   Output --   Net 1440 ml       Exam:    /78   Pulse 90   Temp 98.2 °F (36.8 °C) (Oral)   Resp 20   Ht 5' 2\" (1.575 m)   Wt 99 lb 9.6 oz (45.2 kg)   SpO2 94%   BMI 18.22 kg/m²     General appearance: No apparent distress, appears stated age and cooperative. HEENT: Pupils equal, round, and reactive to light. Conjunctivae/corneas clear. Neck: Supple, with full range of motion. No jugular venous distention. Trachea midline. Respiratory:  Normal respiratory effort. Clear to auscultation, bilaterally without Rales/Wheezes/Rhonchi. Cardiovascular: Regular rate and rhythm with normal S1/S2 without murmurs, rubs or gallops. Abdomen: Soft, non-tender, non-distended with normal bowel sounds. Musculoskeletal: No clubbing, cyanosis or edema bilaterally. Full range of motion without deformity. Skin: Skin color, texture, turgor normal.  No rashes or lesions.   Neurologic:  Neurovascularly intact without any focal

## 2019-07-22 NOTE — PROGRESS NOTES
15  To increase and maintain UB strength                    Plan   Plan  Times per week: 3-6x/wk  Times per day: Daily  Plan weeks: 2 wks  Current Treatment Recommendations: Strengthening, ROM, Balance Training, Functional Mobility Training, Endurance Training, Stair training, Pain Management, Safety Education & Training, Patient/Caregiver Education & Training, Self-Care / ADL, Home Management Training  G-Code     OutComes Score                                                  AM-PAC Score             Goals  Short term goals  Time Frame for Short term goals: 1 wk  Short term goal 1: Tolerate 25-30min BUE ther ex/act, while maintaining O2 sats 90% or above, to increase strength/end for ADL  Short term goal 2: Increase to SBA/Spv toileting  Short term goal 3: SBA/Spv LB dressing  Short term goal 4: Tolerate 2-4hr seated in chair/OOB time for increased act valerie/end  Long term goals  Time Frame for Long term goals : 2 wks  Long term goal 1: I/MI fxl ADL xfers  Long term goal 2: Increase to 5-7min stand valerie/end, while maintaining O2 sats 90% or above, to increase I with ADL  Long term goal 3: I/MI toileting  Long term goal 4: I/MI LB dressing and bathing  Long term goal 5: I BUE HEP  Patient Goals   Patient goals :  To return home when better       Therapy Time   Individual Concurrent Group Co-treatment   Time In  8:30am         Time Out  9:30am         Minutes  [address] [facility]  Number: [id number]

## 2019-07-23 VITALS
BODY MASS INDEX: 18.33 KG/M2 | HEIGHT: 62 IN | OXYGEN SATURATION: 95 % | HEART RATE: 85 BPM | RESPIRATION RATE: 24 BRPM | DIASTOLIC BLOOD PRESSURE: 93 MMHG | SYSTOLIC BLOOD PRESSURE: 139 MMHG | TEMPERATURE: 98.4 F | WEIGHT: 99.6 LBS

## 2019-07-23 PROCEDURE — 6370000000 HC RX 637 (ALT 250 FOR IP): Performed by: INTERNAL MEDICINE

## 2019-07-23 PROCEDURE — 97535 SELF CARE MNGMENT TRAINING: CPT

## 2019-07-23 PROCEDURE — 97110 THERAPEUTIC EXERCISES: CPT

## 2019-07-23 PROCEDURE — 97530 THERAPEUTIC ACTIVITIES: CPT

## 2019-07-23 PROCEDURE — 94640 AIRWAY INHALATION TREATMENT: CPT

## 2019-07-23 PROCEDURE — 6360000002 HC RX W HCPCS: Performed by: INTERNAL MEDICINE

## 2019-07-23 RX ORDER — HYDROCODONE BITARTRATE AND ACETAMINOPHEN 5; 325 MG/1; MG/1
1 TABLET ORAL EVERY 6 HOURS PRN
Qty: 20 TABLET | Refills: 0 | Status: SHIPPED | OUTPATIENT
Start: 2019-07-23 | End: 2019-07-28

## 2019-07-23 RX ORDER — PREDNISONE 20 MG/1
40 TABLET ORAL DAILY
Qty: 20 TABLET | Refills: 0 | Status: SHIPPED | OUTPATIENT
Start: 2019-07-24 | End: 2019-08-03

## 2019-07-23 RX ORDER — ALPRAZOLAM 0.25 MG/1
0.25 TABLET ORAL 3 TIMES DAILY PRN
Qty: 10 TABLET | Refills: 0 | Status: SHIPPED | OUTPATIENT
Start: 2019-07-23 | End: 2019-07-30

## 2019-07-23 RX ADMIN — IPRATROPIUM BROMIDE AND ALBUTEROL SULFATE 1 AMPULE: .5; 3 SOLUTION RESPIRATORY (INHALATION) at 06:16

## 2019-07-23 RX ADMIN — PANTOPRAZOLE SODIUM 40 MG: 40 TABLET, DELAYED RELEASE ORAL at 05:00

## 2019-07-23 RX ADMIN — AMLODIPINE BESYLATE 2.5 MG: 2.5 TABLET ORAL at 07:49

## 2019-07-23 RX ADMIN — GUAIFENESIN 600 MG: 600 TABLET, EXTENDED RELEASE ORAL at 07:50

## 2019-07-23 RX ADMIN — ASPIRIN 81 MG 81 MG: 81 TABLET ORAL at 07:50

## 2019-07-23 RX ADMIN — HYDROCODONE BITARTRATE AND ACETAMINOPHEN 1 TABLET: 5; 325 TABLET ORAL at 04:19

## 2019-07-23 RX ADMIN — IPRATROPIUM BROMIDE AND ALBUTEROL SULFATE 1 AMPULE: .5; 3 SOLUTION RESPIRATORY (INHALATION) at 09:49

## 2019-07-23 RX ADMIN — BUDESONIDE 500 MCG: 0.5 INHALANT RESPIRATORY (INHALATION) at 06:16

## 2019-07-23 RX ADMIN — ESCITALOPRAM OXALATE 5 MG: 10 TABLET ORAL at 07:50

## 2019-07-23 RX ADMIN — HYDROCODONE BITARTRATE AND ACETAMINOPHEN 1 TABLET: 5; 325 TABLET ORAL at 09:59

## 2019-07-23 RX ADMIN — ALPRAZOLAM 0.25 MG: 0.25 TABLET ORAL at 09:59

## 2019-07-23 RX ADMIN — PREDNISONE 40 MG: 20 TABLET ORAL at 07:49

## 2019-07-23 RX ADMIN — MULTIPLE VITAMINS W/ MINERALS TAB 1 TABLET: TAB at 07:50

## 2019-07-23 RX ADMIN — FOLIC ACID 1 MG: 1 TABLET ORAL at 07:49

## 2019-07-23 RX ADMIN — ALPRAZOLAM 0.25 MG: 0.25 TABLET ORAL at 04:19

## 2019-07-23 ASSESSMENT — PAIN DESCRIPTION - PROGRESSION
CLINICAL_PROGRESSION: GRADUALLY IMPROVING
CLINICAL_PROGRESSION: GRADUALLY WORSENING
CLINICAL_PROGRESSION: GRADUALLY IMPROVING

## 2019-07-23 ASSESSMENT — PAIN DESCRIPTION - PAIN TYPE
TYPE: CHRONIC PAIN
TYPE: CHRONIC PAIN

## 2019-07-23 ASSESSMENT — PAIN DESCRIPTION - ONSET
ONSET: ON-GOING
ONSET: ON-GOING

## 2019-07-23 ASSESSMENT — PAIN DESCRIPTION - FREQUENCY
FREQUENCY: CONTINUOUS
FREQUENCY: CONTINUOUS

## 2019-07-23 ASSESSMENT — PAIN DESCRIPTION - ORIENTATION
ORIENTATION: MID;LOWER
ORIENTATION: MID;LOWER

## 2019-07-23 ASSESSMENT — PAIN SCALES - GENERAL
PAINLEVEL_OUTOF10: 2
PAINLEVEL_OUTOF10: 6
PAINLEVEL_OUTOF10: 8

## 2019-07-23 ASSESSMENT — PAIN DESCRIPTION - LOCATION
LOCATION: BACK
LOCATION: BACK

## 2019-07-23 ASSESSMENT — PAIN DESCRIPTION - DESCRIPTORS
DESCRIPTORS: ACHING;SORE
DESCRIPTORS: ACHING;SORE

## 2019-07-23 NOTE — DISCHARGE SUMMARY
Discharge Summary    Patient:  Yancy Kinney  YOB: 1945    MRN: [id number]   Acct: [de-identified]    Primary Care Physician: DERECK Loaiza    Admit date:  7/8/2019    Discharge date:   07/23/19      Discharge Diagnoses:   <principal problem not specified>  Active Problems:    Multifactorial functional impairment  Resolved Problems:    * No resolved hospital problems. *      Admitted for: [facility] Course: patient was treated for COPD exacerbation, chronic/acute respiratory failure, emphysema. Despite maximum support/BIPAP her condition wasn't improving and she accepted hospice approach. Will be DC home with Hospice service     Consultants:      Discharge Medications:       Medication List      START taking these medications    ALPRAZolam 0.25 MG tablet  Commonly known as:  XANAX  Take 1 tablet by mouth 3 times daily as needed for Anxiety for up to 7 days.         CHANGE how you take these medications    predniSONE 20 MG tablet  Commonly known as:  DELTASONE  Take 2 tablets by mouth daily for 10 days  Start taking on:  7/24/2019  What changed:    · medication strength  · how much to take  · how to take this  · when to take this  · additional instructions        CONTINUE taking these medications    albuterol (2.5 MG/3ML) 0.083% nebulizer solution  Commonly known as:  PROVENTIL  Take 3 mLs by nebulization every 6 hours as needed for Wheezing     amLODIPine 5 MG tablet  Commonly known as:  NORVASC  Take 1 tablet by mouth daily     aspirin 81 MG chewable tablet  Take 1 tablet by mouth daily     budesonide-formoterol 160-4.5 MCG/ACT Aero  Commonly known as:  SYMBICORT  Inhale 2 puffs into the lungs 2 times daily     CENTRUM SILVER 50+WOMEN Tabs     fluticasone 50 MCG/ACT nasal spray  Commonly known as:  FLONASE  2 sprays by Each Nostril route daily     folic acid 1 MG tablet  Commonly known as:  FOLVITE  Take 1 tablet by mouth daily     guaiFENesin 600 MG extended release tablet  Commonly known as:  Renetta Shorten  Take 1 tablet by mouth 2 times daily     ipratropium-albuterol 0.5-2.5 (3) MG/3ML Soln nebulizer solution  Commonly known as:  DUONEB     MILK OF MAGNESIA 400 MG/5ML suspension  Generic drug:  magnesium hydroxide     OXYGEN     pantoprazole 40 MG tablet  Commonly known as:  PROTONIX  Take 1 tablet by mouth daily     polyethylene glycol powder  Commonly known as:  GLYCOLAX     SENNA PO     sertraline 25 MG tablet  Commonly known as:  ZOLOFT  Take 1 tablet by mouth daily     tiotropium 2.5 MCG/ACT Aers inhaler  Commonly known as:  SPIRIVA RESPIMAT  Inhale 2 puffs into the lungs daily        STOP taking these medications    HYDROcodone-acetaminophen 5-325 MG per tablet  Commonly known as:  [address] [place] [address]           Where to Get Your Medications      These medications were sent to [address] [place] [address], [address] [place] [id number]    Phone:  [phone]   · predniSONE 20 MG tablet     You can get these medications from any pharmacy    Bring a paper prescription for each of these medications  · ALPRAZolam 0.25 MG tablet         Physical Exam:    Vitals:  Vitals:    07/22/19 1611 07/22/19 2118 07/23/19 0026 07/23/19 0536   BP:    (!) 139/93   Pulse:    85   Resp:  24     Temp:    98.4 °F (36.9 °C)   TempSrc:    Oral   SpO2: 93%  94% 95%   Weight:       Height:         Weight: Weight: 99 lb 9.6 oz (45.2 kg)     24 hour intake/output:    Intake/Output Summary (Last 24 hours) at 7/23/2019 8338  Last data filed at 7/23/2019 0500  Gross per 24 hour   Intake 600 ml   Output --   Net 600 ml       General appearance - alert, well appearing, and in no distress  Chest - clear to auscultation, no wheezes, rales or rhonchi, symmetric air entry  Heart - normal rate, regular rhythm, normal S1, S2, no murmurs, rubs, clicks or gallops  Abdomen - soft, nontender, nondistended, no masses or organomegaly  Obese: No; Protuberant: No 0.1 %    Basophils % 0.0 %    Neutrophils # 8.8 (H) 1.4 - 6.5 K/uL    Lymphocytes # 0.5 (L) 1.0 - 4.8 K/uL    Monocytes # 0.3 0.2 - 0.8 K/uL    Eosinophils # 0.0 0.0 - 0.7 K/uL    Basophils # 0.0 0.0 - 0.2 K/uL   Basic Metabolic Panel   Result Value Ref Range    Sodium 138 135 - 144 mEq/L    Potassium 3.8 3.4 - 4.9 mEq/L    Chloride 102 95 - 107 mEq/L    CO2 24 20 - 31 mEq/L    Anion Gap 12 9 - 15 mEq/L    Glucose 127 (H) 70 - 99 mg/dL    BUN 20 8 - 23 mg/dL    CREATININE 0.39 (L) 0.50 - 0.90 mg/dL    GFR Non-African American >60.0 >60    GFR  >60.0 >60    Calcium 9.3 8.5 - 9.9 mg/dL       Diet:  DIET GENERAL;  Dietary Nutrition Supplements: Clear Liquid Oral Supplement  Dietary Nutrition Supplements: Standard High Calorie Oral Supplement    Activity:  Activity as tolerated (Patient may move about with assist as indicated or with supervision.)    Follow-up:  in 1 weeks with DERECK Muñiz,      Disposition: home    Condition: Stable    Time Spent: 75 minutes    Electronically signed by Andrea Duron MD on 7/23/2019 at 8:32 AM    Discharging Hospitalist

## 2019-07-23 NOTE — PROGRESS NOTES
Physical Therapy  Facility/Department: [facility] MED SURG UNIT  Daily Treatment Note  NAME: Nola Bridges  : 1945  MRN: [id number]    Date of Service: 2019    Discharge Recommendations:  Home with assist PRN, Home with Home health PT        Assessment   Body structures, Functions, Activity limitations: Decreased functional mobility ; Decreased strength;Decreased endurance;Decreased balance  Assessment: Pt declined ambulation and exercises this morning due to leaving soon. Pt wanted to pack up and get herself dressed. Issued and reviewed HEP, therapist demonstrated exercises and pt verbally understood. Prognosis: Good  REQUIRES PT FOLLOW UP: Yes  Activity Tolerance  Activity Tolerance: Patient limited by fatigue     Patient Diagnosis(es): The primary encounter diagnosis was COPD exacerbation ([facility] A diagnosis of Malignant neoplasm of middle lobe of right lung [facility]) was also pertinent to this visit. has a past medical history of COPD (chronic obstructive pulmonary disease) ([facility] and Lung cancer ([facility] has a past surgical history that includes Hysterectomy; Appendectomy; Tonsillectomy; and bronchoscopy (N/A, 2019). Restrictions  Restrictions/Precautions  Restrictions/Precautions: Femoral Block  Position Activity Restriction  Other position/activity restrictions: Monitor O2 sats  Subjective   General  Chart Reviewed: Yes  Family / Caregiver Present: No  Referring Practitioner: Dr Nikolai Lindo: Pt lying in bed upon arrival and agreeable to therapy  Pain Screening  Patient Currently in Pain: Yes  Pain Assessment  Oxygen Therapy  O2 Device: Nasal cannula         Objective    Issued and reviewed HEP pt verbally understood as therapist demonstrated exercises.    Pt declined exercises and amb due to getting dressed and packed up to leave     Stairs/Curb  Stairs?: No     Balance  Sitting - Static: Good  Sitting - Dynamic: Good  Standing - Static: Good  Standing - Dynamic: Good;-

## 2019-07-23 NOTE — PROGRESS NOTES
Occupational Therapy  Facility/Department: [facility] MED SURG UNIT  Daily Treatment Note  NAME: Vidhi Gold  : 1945  MRN: [id number]    Date of Service: 2019    Discharge Recommendations:  (home with daughter ,Devora Tarango)       Assessment      REQUIRES OT FOLLOW UP: Yes     Pt. Is going home today and signed onto hospice  Pt. Needs O2 constantly monitored  Pt. Needs frequent rest breaks  Pt. Is cooperative during OT session    Patient Diagnosis(es): The primary encounter diagnosis was COPD exacerbation ([facility] [address] [facility] A diagnosis of Malignant neoplasm of middle lobe of right lung [facility]) was also pertinent to this visit. has a past medical history of COPD (chronic obstructive pulmonary disease) ([facility] [facility] and [facility] has a past surgical history that includes Hysterectomy; Appendectomy; Tonsillectomy; and bronchoscopy (N/A, 2019). Restrictions  Restrictions/Precautions  Restrictions/Precautions: Femoral Block  Position Activity Restriction  Other position/activity restrictions: Monitor O2 sats  Subjective   General  Chart Reviewed: Yes  Patient assessed for rehabilitation services? : (in subacute as of 19)  Additional Pertinent Hx: Was diagnosed with lung cancer at the beginning of this year  Family / Caregiver Present: No  Referring Practitioner: Dr. Ryan Pac   Diagnosis: COPD exacerbation, pneumonia  Subjective  Subjective: Pt. Is agreeable to OT  Pt. Is going home today. Orientation     Objective           Trained and educated pt. In HEP  Gave pt. Handout for HEP  Trained and educated pt. In energy conservation techniques to decrease fatigue  (taking more breaks, sitting in chair to catch breathe, small activities at a time)  Trained and educated pt.  In breathing techniques to  fatigue  Toilet transfer-supervision  Toileting-supervision with vc for breathing techniques    Answered all of pt.'s questions and concerns regarding home management

## 2019-07-26 PROBLEM — Z51.5 HOSPICE CARE: Status: ACTIVE | Noted: 2019-07-26

## (undated) DEVICE — SYRINGE, LUER SLIP, STERILE, 10ML: Brand: MEDLINE

## (undated) DEVICE — CANNULA NSL SM AD L7FT 6LPM CLR 3 CHN CRV TAPR LTWT OVR THE

## (undated) DEVICE — 12" (30.5 CM) ARTERIAL PRESSURE TUBING: Brand: ICU MEDICAL

## (undated) DEVICE — CANNULA PERF L2IN BLNT TIP 3MM VES CLR RADPQ BODY FEM LUER D

## (undated) DEVICE — Z CONVERTED USE 2271043 CONTAINER SPEC COLL 4OZ SCR ON LID PEEL PCH

## (undated) DEVICE — TRAP,MUCUS SPECIMEN, 80CC: Brand: MEDLINE

## (undated) DEVICE — ENDOSCOPIC TRAY TRNSPRT 20.5X16.5X4.1 IN RECYCL SUGAR PULP

## (undated) DEVICE — Device: Brand: MEDEX

## (undated) DEVICE — SYRINGE, LUER SLIP, 30ML: Brand: MEDLINE

## (undated) DEVICE — BRUSH ENDO CLN L90.5IN SHTH DIA1.7MM BRIST DIA5-7MM 2-6MM

## (undated) DEVICE — PAD,NON-ADHERENT,3X8,STERILE,LF,1/PK: Brand: MEDLINE

## (undated) DEVICE — AIRLIFE™ MISTY MAX 10™ NEBULIZER: Brand: AIRLIFE™

## (undated) DEVICE — AIRLIFE™ OXYGEN TUBING 7 FEET (2.1 M) CRUSH RESISTANT OXYGEN TUBING, VINYL TIPPED: Brand: AIRLIFE™

## (undated) DEVICE — Z INACTIVE USE 2735373 APPLICATOR FBR LAIN COT WOOD TIP ECONOMICAL

## (undated) DEVICE — GLOVE ORANGE PI 7 1/2   MSG9075

## (undated) DEVICE — TUBING, SUCTION, 1/4" X 10', STRAIGHT: Brand: MEDLINE

## (undated) DEVICE — HYPODERMIC SAFETY NEEDLE: Brand: MAGELLAN

## (undated) DEVICE — 6 ML SYRINGE LUER-LOCK TIP: Brand: MONOJECT